# Patient Record
Sex: FEMALE | Race: WHITE | Employment: OTHER | URBAN - METROPOLITAN AREA
[De-identification: names, ages, dates, MRNs, and addresses within clinical notes are randomized per-mention and may not be internally consistent; named-entity substitution may affect disease eponyms.]

---

## 2017-01-04 ENCOUNTER — ANTICOAGULATION THERAPY VISIT (OUTPATIENT)
Dept: FAMILY MEDICINE | Facility: CLINIC | Age: 82
End: 2017-01-04
Payer: COMMERCIAL

## 2017-01-04 ENCOUNTER — TELEPHONE (OUTPATIENT)
Dept: FAMILY MEDICINE | Facility: CLINIC | Age: 82
End: 2017-01-04

## 2017-01-04 DIAGNOSIS — Z79.01 LONG-TERM (CURRENT) USE OF ANTICOAGULANTS: Primary | ICD-10-CM

## 2017-01-04 DIAGNOSIS — I48.91 ATRIAL FIBRILLATION (H): ICD-10-CM

## 2017-01-04 LAB — INR PPP: 2.6

## 2017-01-04 PROCEDURE — 99207 ZZC NO CHARGE NURSE ONLY: CPT | Performed by: INTERNAL MEDICINE

## 2017-01-04 NOTE — PROGRESS NOTES
ANTICOAGULATION FOLLOW-UP CLINIC VISIT    Patient Name:  Sintia Connors  Date:  1/4/2017  Contact Type:  Telephone/ Dose instructions left on [facility] voice mail from home care ([phone])    SUBJECTIVE:     Patient Findings     Positives No Problem Findings           OBJECTIVE    INR   Date Value Ref Range Status   01/04/2017 2.6  Final       ASSESSMENT / PLAN  INR assessment THER    Recheck INR In: 4 WEEKS    INR Location [facility] INR      Anticoagulation Summary as of 1/4/2017     INR goal 2.0-3.0   Selected INR 2.6 (1/4/2017)   Maintenance plan 4.5 mg (3 mg x 1.5) on Mon, Fri; 3 mg (3 mg x 1) all other days   Full instructions 4.5 mg on Mon, Fri; 3 mg all other days   Weekly total 24 mg   No change documented Felisha Cano RN   Plan last modified Amanda Riddle RN (12/5/2016)   Next INR check 2/1/2017   Priority INR   Target end date     Indications   Long-term (current) use of anticoagulants [Z79.01] [Z79.01]  Atrial fibrillation (H) [I48.91]         Anticoagulation Episode Summary     INR check location     Preferred lab     Send INR reminders to [facility]    Comments       Anticoagulation Care Providers     Provider Role Specialty Phone number    Lazaro Gupta MD Responsible Internal Medicine [phone]            See the Encounter Report to view Anticoagulation Flowsheet and Dosing Calendar (Go to Encounters tab in chart review, and find the Anticoagulation Therapy Visit)    Dosage adjustment made based on physician directed care plan.    Felisha Cano RN

## 2017-01-04 NOTE — TELEPHONE ENCOUNTER
Reason for Call:  INR    Who is calling?  Home [facility]: [facility]    Phone number:[phone]    Fax number:    Name of caller: Lindsay    INR Value:  2.6    Are there any other concerns:  No    Can we leave a detailed message on this number? YES    Phone number patient can be reached at: Home number on file [phone] (home)      Call taken on 1/4/2017 at 12:14 PM by Maya Cote

## 2017-01-16 DIAGNOSIS — I48.0 PAROXYSMAL ATRIAL FIBRILLATION (H): Primary | ICD-10-CM

## 2017-01-17 NOTE — TELEPHONE ENCOUNTER
metoprolol (LOPRESSOR) 25 MG tablet      Last Written Prescription Date: 5/11/2016  Last Fill Quantity: 180, # refills: 2    Last Office Visit with [facility], [facility] or [facility] prescribing provider:  9/13/2016   Future Office Visit:        BP Readings from Last 3 Encounters:   09/13/16 136/74   03/15/16 135/82   10/06/15 131/75

## 2017-01-18 RX ORDER — METOPROLOL TARTRATE 25 MG/1
TABLET, FILM COATED ORAL
Qty: 135 TABLET | Refills: 0 | Status: SHIPPED | OUTPATIENT
Start: 2017-01-18 | End: 2017-08-09

## 2017-02-08 ENCOUNTER — ANTICOAGULATION THERAPY VISIT (OUTPATIENT)
Dept: FAMILY MEDICINE | Facility: CLINIC | Age: 82
End: 2017-02-08
Payer: COMMERCIAL

## 2017-02-08 ENCOUNTER — TELEPHONE (OUTPATIENT)
Dept: FAMILY MEDICINE | Facility: CLINIC | Age: 82
End: 2017-02-08

## 2017-02-08 DIAGNOSIS — I48.91 ATRIAL FIBRILLATION (H): ICD-10-CM

## 2017-02-08 DIAGNOSIS — Z79.01 LONG-TERM (CURRENT) USE OF ANTICOAGULANTS: Primary | ICD-10-CM

## 2017-02-08 LAB — INR PPP: 2.8

## 2017-02-08 PROCEDURE — 99207 ZZC NO CHARGE NURSE ONLY: CPT | Performed by: INTERNAL MEDICINE

## 2017-02-08 NOTE — TELEPHONE ENCOUNTER
Reason for Call:  INR    Who is calling?  Home [facility]: [facility]    Phone number:  [phone]    Name of caller: Lindsay     INR Value:  2.8    Are there any other concerns:  No    Can we leave a detailed message on this number? YES    Phone number patient can be reached at: Home number on file [phone] (home)      Call taken on 2/8/2017 at 4:26 PM by Ana Higginbotham

## 2017-02-08 NOTE — PROGRESS NOTES
ANTICOAGULATION FOLLOW-UP CLINIC VISIT    Patient Name:  Sintia Connors  Date:  2/8/2017  Contact Type:  Telephone    SUBJECTIVE:     Patient Findings     Positives Missed doses           OBJECTIVE    INR   Date Value Ref Range Status   02/08/2017 2.8  Final       ASSESSMENT / PLAN  INR assessment THER    Recheck INR In: 4 WEEKS    INR Location [facility] INR      Anticoagulation Summary as of 2/8/2017     INR goal 2.0-3.0   Selected INR 2.8 (2/8/2017)   Maintenance plan 4.5 mg (3 mg x 1.5) on Mon, Fri; 3 mg (3 mg x 1) all other days   Full instructions 4.5 mg on Mon, Fri; 3 mg all other days   Weekly total 24 mg   Plan last modified Amanda Riddle RN (12/5/2016)   Next INR check 3/15/2017   Priority INR   Target end date     Indications   Long-term (current) use of anticoagulants [Z79.01] [Z79.01]  Atrial fibrillation (H) [I48.91]         Anticoagulation Episode Summary     INR check location     Preferred lab     Send INR reminders to [facility]    Comments       Anticoagulation Care Providers     Provider Role Specialty Phone number    Lazaro Gupta MD [facility] [phone]            See the Encounter Report to view Anticoagulation Flowsheet and Dosing Calendar (Go to Encounters tab in chart review, and find the Anticoagulation Therapy Visit)    Dosage adjustment made based on physician directed care plan. [facility] nurse, Lindsay, reports INR 2.8.  Continue same dosing and recheck 4-5 weeks. Lindsay's phone: [phone]    Bertha Tidwell RN

## 2017-02-27 ENCOUNTER — ANTICOAGULATION THERAPY VISIT (OUTPATIENT)
Dept: FAMILY MEDICINE | Facility: CLINIC | Age: 82
End: 2017-02-27
Payer: COMMERCIAL

## 2017-02-27 ENCOUNTER — TELEPHONE (OUTPATIENT)
Dept: FAMILY MEDICINE | Facility: CLINIC | Age: 82
End: 2017-02-27

## 2017-02-27 DIAGNOSIS — I48.0 PAROXYSMAL ATRIAL FIBRILLATION (H): ICD-10-CM

## 2017-02-27 DIAGNOSIS — F41.9 ANXIETY: ICD-10-CM

## 2017-02-27 DIAGNOSIS — Z79.01 LONG-TERM (CURRENT) USE OF ANTICOAGULANTS: ICD-10-CM

## 2017-02-27 LAB — INR PPP: 2.6

## 2017-02-27 PROCEDURE — 99207 ZZC NO CHARGE NURSE ONLY: CPT | Performed by: INTERNAL MEDICINE

## 2017-02-27 NOTE — PROGRESS NOTES
ANTICOAGULATION FOLLOW-UP CLINIC VISIT    Patient Name:  Sintia Connors  Date:  2/27/2017  Contact Type:  Telephone/ [facility]    SUBJECTIVE:     Patient Findings     Positives No Problem Findings           OBJECTIVE    INR   Date Value Ref Range Status   02/27/2017 2.6  Final       ASSESSMENT / PLAN  INR assessment THER    Recheck INR In: 4 WEEKS    INR Location [facility] INR      Anticoagulation Summary as of 2/27/2017     INR goal 2.0-3.0   Today's INR 2.6   Maintenance plan 4.5 mg (3 mg x 1.5) on Mon, Fri; 3 mg (3 mg x 1) all other days   Full instructions 4.5 mg on Mon, Fri; 3 mg all other days   Weekly total 24 mg   No change documented Amanda Riddle RN   Plan last modified Amanda Riddle RN (12/5/2016)   Next INR check 3/27/2017   Priority INR   Target end date     Indications   Long-term (current) use of anticoagulants [Z79.01] [Z79.01]  Atrial fibrillation (H) [I48.91]         Anticoagulation Episode Summary     INR check location     Preferred lab     Send INR reminders to [facility]AG[facility]    Comments       Anticoagulation Care Providers     Provider Role Specialty Phone number    Lazaro Gupta MD Responsible Internal Medicine [phone]            See the Encounter Report to view Anticoagulation Flowsheet and Dosing Calendar (Go to Encounters tab in chart review, and find the Anticoagulation Therapy Visit)  Nurse Lindsay, Confirms and agrees to dosing scheduled as above. Dosing based on [facility] Protocol and Provider directed care plan.       Amanda Riddle RN

## 2017-02-27 NOTE — MR AVS SNAPSHOT
Sintia Connors   2/27/2017   Anticoagulation Therapy Visit    Description:  81 year old female   Provider:  Lazaro Gupta MD   Department:  [facility]           INR as of 2/27/2017     Today's INR 2.6      Anticoagulation Summary as of 2/27/2017     INR goal 2.0-3.0   Today's INR 2.6   Full instructions 4.5 mg on Mon, Fri; 3 mg all other days   Next INR check 3/27/2017    Indications   Long-term (current) use of anticoagulants [Z79.01] [Z79.01]  Atrial fibrillation (H) [I48.91]         Description     INR     Who is calling? Home [facility]     Phone number: [phone]     Name of caller: Lindsay     INR Value: 2.6      February 2017 Details    Sun Mon Tue Wed Thu Fri Sat        1               2               3               4                 5               6               7               8               9               10               11                 12               13               14               15               16               17               18                 19               20               21               22               23               24               25                 26               27      4.5 mg   See details      28      3 mg              Date Details   02/27 This INR check               How to take your warfarin dose     To take:  3 mg Take 1 of the 3 mg tablets.    To take:  4.5 mg Take 1.5 of the 3 mg tablets.           March 2017 Details    Sun Mon Tue Wed Thu Fri Sat        1      3 mg         2      3 mg         3      4.5 mg         4      3 mg           5      3 mg         6      4.5 mg         7      3 mg         8      3 mg         9      3 mg         10      4.5 mg         11      3 mg           12      3 mg         13      4.5 mg         14      3 mg         15      3 mg         16      3 mg         17      4.5 mg         18      3 mg           19      3 mg         20      4.5 mg         21      3 mg         22      3 mg         23      3  mg         24      4.5 mg         25      3 mg           26      3 mg         27            28               29               30               31                 Date Details   No additional details    Date of next INR:  3/27/2017         How to take your warfarin dose     To take:  3 mg Take 1 of the 3 mg tablets.    To take:  4.5 mg Take 1.5 of the 3 mg tablets.

## 2017-02-27 NOTE — TELEPHONE ENCOUNTER
Reason for Call:  INR    Who is calling?  Home [facility]    Phone number:  [phone]    Name of caller: Lindsay    INR Value:  2.6    Are there any other concerns:  No    Can we leave a detailed message on this number? YES    Call taken on 2/27/2017 at 1:07 PM by Singh Cunningham

## 2017-02-28 NOTE — TELEPHONE ENCOUNTER
QUEtiapine (SEROQUEL) 25 MG tablet       Last Written Prescription Date: 5/23/2016  Last Fill Quantity: 180, # refills: 2  Last Office Visit with [facility], [facility] or [facility] prescribing provider: 9/13/2016  Next 5 appointments (look out 90 days)     Mar 07, 2017  3:30 PM CST   Office Visit with [facility] MD   [facility]    [address] [address]   [phone]                   BP Readings from Last 3 Encounters:   09/13/16 136/74   03/15/16 135/82   10/06/15 131/75     Pulse Readings from Last 2 Encounters:   09/13/16 80   03/15/16 97     Lab Results   Component Value Date    GLC 82 03/15/2016     Lab Results   Component Value Date    WBC 8.1 03/15/2016     Lab Results   Component Value Date    RBC 5.11 03/15/2016     Lab Results   Component Value Date    HGB 13.8 03/15/2016     Lab Results   Component Value Date    HCT 43.4 03/15/2016     No components found for: MCT  Lab Results   Component Value Date    MCV 85 03/15/2016     Lab Results   Component Value Date    MCH 27.0 03/15/2016     Lab Results   Component Value Date    MCHC 31.8 03/15/2016     Lab Results   Component Value Date    RDW 16.6 03/15/2016     Lab Results   Component Value Date     03/15/2016     Lab Results   Component Value Date    CHOL 158 12/24/2014     Lab Results   Component Value Date    HDL 56 12/24/2014     Lab Results   Component Value Date    LDL 87 12/24/2014     07/30/2013     Lab Results   Component Value Date    TRIG 77 12/24/2014     Lab Results   Component Value Date    CHOLHDLRATIO 2.8 12/24/2014

## 2017-03-01 RX ORDER — QUETIAPINE FUMARATE 25 MG/1
TABLET, FILM COATED ORAL
Qty: 180 TABLET | Refills: 0 | Status: SHIPPED | OUTPATIENT
Start: 2017-03-01 | End: 2017-12-12

## 2017-03-01 NOTE — TELEPHONE ENCOUNTER
Prescription approved per [facility] Protocol.  Bertha Tidwell RN

## 2017-03-07 ENCOUNTER — OFFICE VISIT (OUTPATIENT)
Dept: FAMILY MEDICINE | Facility: CLINIC | Age: 82
End: 2017-03-07
Payer: COMMERCIAL

## 2017-03-07 VITALS
HEIGHT: 60 IN | SYSTOLIC BLOOD PRESSURE: 126 MMHG | WEIGHT: 171 LBS | DIASTOLIC BLOOD PRESSURE: 74 MMHG | BODY MASS INDEX: 33.57 KG/M2 | HEART RATE: 98 BPM

## 2017-03-07 DIAGNOSIS — R07.9 CHEST PAIN, UNSPECIFIED TYPE: Primary | ICD-10-CM

## 2017-03-07 PROCEDURE — 93000 ELECTROCARDIOGRAM COMPLETE: CPT | Performed by: NURSE PRACTITIONER

## 2017-03-07 PROCEDURE — 99214 OFFICE O/P EST MOD 30 MIN: CPT | Performed by: NURSE PRACTITIONER

## 2017-03-07 NOTE — PROGRESS NOTES
HPI    SUBJECTIVE:                                                    Sintia Connors is a 81 year old female who presents to clinic today for the following health issues:      CHEST PAIN     Onset: 1 week ago    Description:   Location:  Center of chest  Character: sharp  Radiation: none  Duration: 1 minute     Intensity: moderate    Progression of Symptoms:  Improved now    Accompanying Signs & Symptoms:  Shortness of breath: YES- but always has SOB issues  Sweating: no   Nausea/vomiting: no   Lightheadedness: no   Palpitations: no  Fever/Chills: no   Cough: no   Heartburn: YES- hx    History:   Family history of heart disease YES  Tobacco use: no     Precipitating factors:   Worse with exertion: not since  Worse with deep breaths :  no   Related to food: no     Alleviating factors:  Sitting to rest       Therapies Tried and outcome: none      Was on home bike about 4 hours in where she got chest pain right between the breasts 1 week ago. As soon as the chest pain started, she stopped biking and laid down   This was the first episode in 5 months   This only lasted 1 minute and hasn't returned  On coumadin   Has been biking everyday since this episode without symptoms   [place] like her heart was hollow and she felt like it was similar to heartburn that she does get      Past Medical History   Diagnosis Date     Atrial flutter (H) 4/12/15     post op ANW     GERD (gastroesophageal reflux disease)      PVC (premature ventricular contraction)      Rheumatoid arthritis (H)      S/P lumpectomy of breast      benign      Seizures (H)      Senile osteoporosis      Past Surgical History   Procedure Laterality Date     Hernia repair       hiatial     Hernia repair       inguial hernia repair     Hysterectomy total abdominal, bilateral salpingo-oophorectomy, combined       Appendectomy       Athroplasty right knee  4/2015     C total knee arthroplasty Left 09/2015     Social History   Substance Use Topics     Smoking status:  Former Smoker     Quit date: 7/25/1983     Smokeless tobacco: Never Used     Alcohol use No     Current Outpatient Prescriptions   Medication Sig Dispense Refill     QUEtiapine (SEROQUEL) 25 MG tablet TAKE 1 TABLET(25 MG) BY MOUTH TWICE DAILY 180 tablet 0     metoprolol (LOPRESSOR) 25 MG tablet TAKE 1 AND 1/2 TABLETS(37.5 MG) BY MOUTH TWICE DAILY 135 tablet 0     warfarin (COUMADIN) 3 MG tablet TAKE 1 TO 1& 1/2 TABLETS(3 TO 4.5MG) BY MOUTH EVERY DAY AS DIRECTED BY INR CLINIC 110 tablet 1     flunisolide (NASALIDE) 25 MCG/ACT (0.025%) SOLN spray Spray 2 sprays into both nostrils 2 times daily 1 Bottle 3     mirtazapine (REMERON) 15 MG tablet TAKE 1 TABLET BY MOUTH AT BEDTIME 90 tablet 3     diclofenac (VOLTAREN) 1 % GEL APPLY 4 GRAMS TO KNEES OR 2 GRAMS TO HANDS FOUR TIMES DAILY USING ENCLOSED DOSING CARD. 100 g 3     esomeprazole (NEXIUM) 40 MG capsule Take 1 capsule (40 mg) by mouth every morning (before breakfast) Take 30-60 minutes before a eating. 30 capsule 11     calcium carbonate (TUMS) 500 MG chewable tablet Take 1 tablet (500 mg) by mouth every 6 hours as needed for heartburn 90 tablet      glucosamine-chondroitin 500-400 MG CAPS Take 1 capsule by mouth 3 times daily       acetaminophen (TYLENOL) 325 MG tablet Take 1-2 tablets (325-650 mg) by mouth every 6 hours as needed for mild pain Take 2 tabs (1000 mg) as needed three times daily       ORDER FOR DME Equipment being ordered: Shower Chair 1 each 0     ORDER FOR DME Equipment being ordered: Bath transfer bench  Fax to:[phone] 1 Device 0     ORDER FOR DME Equipment being ordered: transfer bath bench 1 Device 0     levETIRAcetam (KEPPRA) 500 MG tablet Take 1 tablet (500 mg) by mouth 2 times daily Verbal order per ; given to pharmacist at this time. (Patient taking differently: Take 750 mg by mouth 2 times daily Verbal order per ; given to pharmacist at this time.) 180 tablet 1     multivitamin, therapeutic with minerals  (MULTI-VITAMIN) TABS Take 1 tablet by mouth daily.       metoprolol (LOPRESSOR) 25 MG tablet Take 1 tablet (25 mg) by mouth 2 times daily 180 tablet 2     Allergies   Allergen Reactions     Lorazepam Visual Disturbance     Severe hallucinations;       Reviewed PMH, med list and allergies.      ROS  Detailed as above       /74 (BP Location: Right arm, Patient Position: Chair, Cuff Size: Adult Large)  Pulse 98  Ht 5' (1.524 m)  Wt 171 lb (77.6 kg)  Breastfeeding? No  BMI 33.4 kg/m2      Physical Exam   Constitutional: She is well-developed, well-nourished, and in no distress.   HENT:   Head: Normocephalic.   Eyes: Conjunctivae are normal.   Neck: Normal range of motion.   Cardiovascular: Normal rate, regular rhythm and normal heart sounds.    No murmur heard.  Pulmonary/Chest: Effort normal and breath sounds normal. No respiratory distress.   Musculoskeletal: Normal range of motion.   Neurological: She is alert.   Skin: Skin is warm and dry.   Psychiatric: Mood and affect normal.   Vitals reviewed.      Assessment and Plan:       ICD-10-CM    1. Chest pain, unspecified type R07.9 EKG 12-lead complete w/read - Clinics       A single episode of 1 min of chest pain 1 week ago while on her bike for about 4 hours.   She has been biking long durations since this episode without recurrence of CP   EKG today is normal and unchanged from previous.   As she has been able to exercise without any CP for the last week, I feel it is safe to watch and wait. If any new symptoms develop, she should call us right away          RONY Whitfield, CNP  [facility]

## 2017-03-07 NOTE — MR AVS SNAPSHOT
After Visit Summary   3/7/2017    Sintia Connors    MRN: [id number]           Patient Information     Date Of Birth          1935        Visit Information        Provider Department      3/7/2017 4:15 PM Tonia Brice APRN CNP [facility]        Today's Diagnoses     Chest pain, unspecified type    -  1       Follow-ups after your visit        Who to contact     If you have questions or need follow up information about today's clinic visit or your schedule please contact [facility] directly at [phone].  Normal or non-critical lab and imaging results will be communicated to you by "[employer]hart, letter or phone within 4 business days after the clinic has received the results. If you do not hear from us within 7 days, please contact the clinic through [employer] or phone. If you have a critical or abnormal lab result, we will notify you by phone as soon as possible.  Submit refill requests through [employer] or call your pharmacy and they will forward the refill request to us. Please allow 3 business days for your refill to be completed.          Additional Information About Your Visit        MyChart Information     [employer] gives you secure access to your electronic health record. If you see a primary care provider, you can also send messages to your care team and make appointments. If you have questions, please call your primary care clinic.  If you do not have a primary care provider, please call [phone] and they will assist you.        Care EveryWhere ID     This is your Care EveryWhere ID. This could be used by other organizations to access your [facility] medical records  [id number]        Your Vitals Were     Pulse Height Breastfeeding? BMI (Body Mass Index)          98 5' (1.524 m) No 33.4 kg/m2         Blood Pressure from Last 3 Encounters:   03/07/17 126/74   09/13/16 136/74   03/15/16 135/82    Weight from Last 3 Encounters:   03/07/17 171 lb (77.6 kg)    09/13/16 171 lb (77.6 kg)   03/15/16 171 lb (77.6 kg)              We Performed the Following     EKG 12-lead complete w/read - Clinics          Today's Medication Changes          These changes are accurate as of: 3/7/17  5:28 PM.  If you have any questions, ask your nurse or doctor.               These medicines have changed or have updated prescriptions.        Dose/Directions    levETIRAcetam 500 MG tablet   Commonly known as:  KEPPRA   This may have changed:    - how much to take  - additional instructions   Used for:  Seizure (H)        Dose:  500 mg   Take 1 tablet (500 mg) by mouth 2 times daily Verbal order per ; given to pharmacist at this time.   Quantity:  180 tablet   Refills:  1                Primary Care Provider Office Phone # Fax #    Lazaro Gupta [phone]       [facility] [address] [facility] [address]  [facility] [address]        Thank you!     Thank you for choosing [facility]  for your care. Our goal is always to provide you with excellent care. Hearing back from our patients is one way we can continue to improve our services. Please take a few minutes to complete the written survey that you may receive in the mail after your visit with us. Thank you!             Your Updated Medication List - Protect others around you: Learn how to safely use, store and throw away your medicines at www.disposemymeds.org.          This list is accurate as of: 3/7/17  5:28 PM.  Always use your most recent med list.                   Brand Name Dispense Instructions for use    acetaminophen 325 MG tablet    TYLENOL     Take 1-2 tablets (325-650 mg) by mouth every 6 hours as needed for mild pain Take 2 tabs (1000 mg) as needed three times daily       calcium carbonate 500 MG chewable tablet    TUMS    90 tablet    Take 1 tablet (500 mg) by mouth every 6 hours as needed for heartburn       diclofenac 1 % Gel topical gel    VOLTAREN    100 g    APPLY 4 GRAMS  TO KNEES OR 2 GRAMS TO HANDS FOUR TIMES DAILY USING ENCLOSED DOSING CARD.       esomeprazole 40 MG CR capsule    nexIUM    30 capsule    Take 1 capsule (40 mg) by mouth every morning (before breakfast) Take 30-60 minutes before a eating.       flunisolide 25 MCG/ACT (0.025%) Soln spray    NASALIDE    1 Bottle    Spray 2 sprays into both nostrils 2 times daily       glucosamine-chondroitin 500-400 MG Caps per capsule      Take 1 capsule by mouth 3 times daily       levETIRAcetam 500 MG tablet    KEPPRA    180 tablet    Take 1 tablet (500 mg) by mouth 2 times daily Verbal order per ; given to pharmacist at this time.       * metoprolol 25 MG tablet    LOPRESSOR    180 tablet    Take 1 tablet (25 mg) by mouth 2 times daily       * metoprolol 25 MG tablet    LOPRESSOR    135 tablet    TAKE 1 AND 1/2 TABLETS(37.5 MG) BY MOUTH TWICE DAILY       mirtazapine 15 MG tablet    REMERON    90 tablet    TAKE 1 TABLET BY MOUTH AT BEDTIME       Multi-vitamin Tabs tablet      Take 1 tablet by mouth daily.       * order for DME     1 Device    Equipment being ordered: transfer bath bench       * order for DME     1 Device    Equipment being ordered: Bath transfer bench Fax to:[phone]       * order for DME     1 each    Equipment being ordered: Shower Chair       QUEtiapine 25 MG tablet    SEROquel    180 tablet    TAKE 1 TABLET(25 MG) BY MOUTH TWICE DAILY       warfarin 3 MG tablet    COUMADIN    110 tablet    TAKE 1 TO 1& 1/2 TABLETS(3 TO 4.5MG) BY MOUTH EVERY DAY AS DIRECTED BY INR CLINIC       * Notice:  This list has 5 medication(s) that are the same as other medications prescribed for you. Read the directions carefully, and ask your doctor or other care provider to review them with you.

## 2017-03-20 ENCOUNTER — ANTICOAGULATION THERAPY VISIT (OUTPATIENT)
Dept: FAMILY MEDICINE | Facility: CLINIC | Age: 82
End: 2017-03-20
Payer: COMMERCIAL

## 2017-03-20 ENCOUNTER — TELEPHONE (OUTPATIENT)
Dept: FAMILY MEDICINE | Facility: CLINIC | Age: 82
End: 2017-03-20

## 2017-03-20 DIAGNOSIS — I48.0 PAROXYSMAL ATRIAL FIBRILLATION (H): ICD-10-CM

## 2017-03-20 DIAGNOSIS — Z79.01 LONG-TERM (CURRENT) USE OF ANTICOAGULANTS: ICD-10-CM

## 2017-03-20 LAB — INR PPP: 4

## 2017-03-20 PROCEDURE — 99207 ZZC NO CHARGE NURSE ONLY: CPT | Performed by: INTERNAL MEDICINE

## 2017-03-20 NOTE — TELEPHONE ENCOUNTER
ACC Enct Started but need to discuss with nurse  Left message for Lindsay russo to call back to clinic    Need to know any changes, any extra doses, signs infection, and schedule next check in 1 week  Poornima Sebastian RN

## 2017-03-20 NOTE — MR AVS SNAPSHOT
Sintia Connors   3/20/2017   Anticoagulation Therapy Visit    Description:  81 year old female   Provider:  Lazaro Gupta MD   Department:  [facility]           INR as of 3/20/2017     Today's INR 4.0!      Anticoagulation Summary as of 3/20/2017     INR goal 2.0-3.0   Today's INR 4.0!   Full instructions 3/20: Hold; Otherwise 4.5 mg on Mon, Fri; 3 mg all other days   Next INR check 3/27/2017    Indications   Long-term (current) use of anticoagulants [Z79.01] [Z79.01]  Atrial fibrillation (H) [I48.91]         March 2017 Details    Sun Mon Tue Wed Thu Fri Sat        1               2               3               4                 5               6               7               8               9               10               11                 12               13               14               15               16               17               18                 19               20      Hold   See details      21      3 mg         22      3 mg         23      3 mg         24      4.5 mg         25      3 mg           26      3 mg         27            28               29               30               31                 Date Details   03/20 This INR check       Date of next INR:  3/27/2017         How to take your warfarin dose     To take:  3 mg Take 1 of the 3 mg tablets.    To take:  4.5 mg Take 1.5 of the 3 mg tablets.    Hold Do not take your warfarin dose. See the Details table to the right for additional instructions.

## 2017-03-20 NOTE — PROGRESS NOTES
ANTICOAGULATION FOLLOW-UP CLINIC VISIT    Patient Name:  Sintia Connors  Date:  3/20/2017  Contact Type:  Telephone/ Spoke with Lindsay (Home Care RN). Provided dosing instruction and will recheck in 1 week.    SUBJECTIVE:     Patient Findings     Positives Unexplained INR or factor level change    Comments I spoke with Home Care RN--No change diet, no new medications, no recent illness. Patient reports that she is feeling well.            OBJECTIVE    INR   Date Value Ref Range Status   03/20/2017 4.0  Final       ASSESSMENT / PLAN  INR assessment SUPRA    Recheck INR In: 1 WEEK    INR Location [facility] INR      Anticoagulation Summary as of 3/20/2017     INR goal 2.0-3.0   Today's INR 4.0!   Maintenance plan 4.5 mg (3 mg x 1.5) on Mon, Fri; 3 mg (3 mg x 1) all other days   Full instructions 3/20: Hold; Otherwise 4.5 mg on Mon, Fri; 3 mg all other days   Weekly total 24 mg   Plan last modified Amanda Riddle RN (12/5/2016)   Next INR check 3/27/2017   Priority INR   Target end date     Indications   Long-term (current) use of anticoagulants [Z79.01] [Z79.01]  Atrial fibrillation (H) [I48.91]         Anticoagulation Episode Summary     INR check location     Preferred lab     Send INR reminders to CS ANTICOAGULATION    Comments       Anticoagulation Care Providers     Provider Role Specialty Phone number    IrisramezLazaro MD [facility] Internal Medicine [phone]            See the Encounter Report to view Anticoagulation Flowsheet and Dosing Calendar (Go to Encounters tab in chart review, and find the Anticoagulation Therapy Visit)    Dosage adjustment made based on physician directed care plan.  Will have patient hold dose for tonight due to high INR (4.0). Resume normal dosing after that.   Recheck in 1 week.     Lindsay Ellis RN

## 2017-03-20 NOTE — TELEPHONE ENCOUNTER
Spoke with home care RN and provided dosing instructions based on INR results for patient.  See Anticoagulation Encounter from 3/20/17 for more details.    Lindsay Ellis RN

## 2017-03-20 NOTE — TELEPHONE ENCOUNTER
Reason for Call:  INR    Who is calling?  Home Care: [facility]     Phone number:  [phone]    Fax number:  NA     Name of caller: Lindsay     INR Value:  4.0    Are there any other concerns: NO     Can we leave a detailed message on this number? YES    Phone number patient can be reached at: Other phone number:  NA       Call taken on 3/20/2017 at 11:47 AM by Gaviota Tijerina

## 2017-03-27 ENCOUNTER — ANTICOAGULATION THERAPY VISIT (OUTPATIENT)
Dept: FAMILY MEDICINE | Facility: CLINIC | Age: 82
End: 2017-03-27
Payer: COMMERCIAL

## 2017-03-27 ENCOUNTER — TELEPHONE (OUTPATIENT)
Dept: FAMILY MEDICINE | Facility: CLINIC | Age: 82
End: 2017-03-27

## 2017-03-27 DIAGNOSIS — I48.0 PAROXYSMAL ATRIAL FIBRILLATION (H): ICD-10-CM

## 2017-03-27 DIAGNOSIS — Z79.01 LONG-TERM (CURRENT) USE OF ANTICOAGULANTS: ICD-10-CM

## 2017-03-27 LAB — INR PPP: 2.5

## 2017-03-27 PROCEDURE — 99207 ZZC NO CHARGE NURSE ONLY: CPT | Performed by: INTERNAL MEDICINE

## 2017-03-27 NOTE — MR AVS SNAPSHOT
Sintia Connors   3/27/2017   Anticoagulation Therapy Visit    Description:  81 year old female   Provider:  Lazaro Gupta MD   Department:  [facility]           INR as of 3/27/2017     Today's INR 2.5      Anticoagulation Summary as of 3/27/2017     INR goal 2.0-3.0   Today's INR 2.5   Full instructions 4.5 mg on Mon, Fri; 3 mg all other days   Next INR check 4/10/2017    Indications   Long-term (current) use of anticoagulants [Z79.01] [Z79.01]  Atrial fibrillation (H) [I48.91]         Description     INR     Who is calling? Home Care:      Phone number: [phone]     Fax number:         Name of caller: Lindsay     INR Value: 2.5      March 2017 Details    Sun Mon Tue Wed Thu Fri Sat        1               2               3               4                 5               6               7               8               9               10               11                 12               13               14               15               16               17               18                 19               20               21               22               23               24               25                 26               27      4.5 mg   See details      28      3 mg         29      3 mg         30      3 mg         31      4.5 mg           Date Details   03/27 This INR check               How to take your warfarin dose     To take:  3 mg Take 1 of the 3 mg tablets.    To take:  4.5 mg Take 1.5 of the 3 mg tablets.           April 2017 Details    Sun Mon Tue Wed Thu Fri Sat           1      3 mg           2      3 mg         3      4.5 mg         4      3 mg         5      3 mg         6      3 mg         7      4.5 mg         8      3 mg           9      3 mg         10            11               12               13               14               15                 16               17               18               19               20               21               22                 23                24               25               26               27               28               29                 30                      Date Details   No additional details    Date of next INR:  4/10/2017         How to take your warfarin dose     To take:  3 mg Take 1 of the 3 mg tablets.    To take:  4.5 mg Take 1.5 of the 3 mg tablets.

## 2017-03-27 NOTE — PROGRESS NOTES
ANTICOAGULATION FOLLOW-UP CLINIC VISIT    Patient Name:  Sintia Connors  Date:  3/27/2017  Contact Type:  Telephone/ [facility]    SUBJECTIVE:        OBJECTIVE    INR   Date Value Ref Range Status   03/27/2017 2.5  Final       ASSESSMENT / PLAN  INR assessment THER    Recheck INR In: 2 WEEKS    INR Location [facility] INR      Anticoagulation Summary as of 3/27/2017     INR goal 2.0-3.0   Today's INR 2.5   Maintenance plan 4.5 mg (3 mg x 1.5) on Mon, Fri; 3 mg (3 mg x 1) all other days   Full instructions 4.5 mg on Mon, Fri; 3 mg all other days   Weekly total 24 mg   No change documented Amanda Riddle RN   Plan last modified Amanda Riddle RN (12/5/2016)   Next INR check 4/10/2017   Priority INR   Target end date     Indications   Long-term (current) use of anticoagulants [Z79.01] [Z79.01]  Atrial fibrillation (H) [I48.91]         Anticoagulation Episode Summary     INR check location     Preferred lab     Send INR reminders to [facility] ANTICOAGULATION    Comments       Anticoagulation Care Providers     Provider Role Specialty Phone number    Lazaro Gupta MD [facility] Internal Medicine [phone]            See the Encounter Report to view Anticoagulation Flowsheet and Dosing Calendar (Go to Encounters tab in chart review, and find the Anticoagulation Therapy Visit)    Lindsay confirms and agrees to dosing scheduled as above.  Dosing based on [facility] Protocol and Provider directed care plan.      Amanda Riddle RN

## 2017-03-27 NOTE — TELEPHONE ENCOUNTER
Reason for Call:  INR    Who is calling?  Home Care: [facility]    Phone number:  [phone]    Fax number:        Name of caller: Lindsay    INR Value:  2.5    Are there any other concerns:  No    Can we leave a detailed message on this number? YES          Call taken on 3/27/2017 at 11:55 AM by Bonnie Joy

## 2017-04-10 ENCOUNTER — ANTICOAGULATION THERAPY VISIT (OUTPATIENT)
Dept: FAMILY MEDICINE | Facility: CLINIC | Age: 82
End: 2017-04-10
Payer: COMMERCIAL

## 2017-04-10 ENCOUNTER — TELEPHONE (OUTPATIENT)
Dept: FAMILY MEDICINE | Facility: CLINIC | Age: 82
End: 2017-04-10

## 2017-04-10 DIAGNOSIS — I48.0 PAROXYSMAL ATRIAL FIBRILLATION (H): ICD-10-CM

## 2017-04-10 DIAGNOSIS — Z79.01 LONG-TERM (CURRENT) USE OF ANTICOAGULANTS: ICD-10-CM

## 2017-04-10 LAB — INR PPP: 2.5

## 2017-04-10 PROCEDURE — 99207 ZZC NO CHARGE NURSE ONLY: CPT | Performed by: INTERNAL MEDICINE

## 2017-04-10 NOTE — PROGRESS NOTES
ANTICOAGULATION FOLLOW-UP CLINIC VISIT    Patient Name:  Sintia Connors  Date:  4/10/2017  Contact Type:  Telephone/ [facility]    SUBJECTIVE:     Patient Findings     Positives No Problem Findings           OBJECTIVE    INR   Date Value Ref Range Status   04/10/2017 2.5  Final       ASSESSMENT / PLAN  INR assessment THER    Recheck INR In: 4 WEEKS    INR Location [facility] INR      Anticoagulation Summary as of 4/10/2017     INR goal 2.0-3.0   Today's INR 2.5   Maintenance plan 4.5 mg (3 mg x 1.5) on Mon, Fri; 3 mg (3 mg x 1) all other days   Full instructions 4.5 mg on Mon, Fri; 3 mg all other days   Weekly total 24 mg   No change documented Amanda Riddle RN   Plan last modified Amanda Riddle RN (12/5/2016)   Next INR check 5/8/2017   Priority INR   Target end date     Indications   Long-term (current) use of anticoagulants [Z79.01] [Z79.01]  Atrial fibrillation (H) [I48.91]         Anticoagulation Episode Summary     INR check location     Preferred lab     Send INR reminders to [facility] ANTICOAGULATION    Comments       Anticoagulation Care Providers     Provider Role Specialty Phone number    Lazaro Gupta MD Responsible Internal Medicine [phone]            See the Encounter Report to view Anticoagulation Flowsheet and Dosing Calendar (Go to Encounters tab in chart review, and find the Anticoagulation Therapy Visit)    Detailed message left for Lindsay with dosing instructions. Dosing based on [facility] Protocol and Provider directed care plan.      Amanda Riddle RN

## 2017-04-10 NOTE — MR AVS SNAPSHOT
Sintia Shepherdroberth   4/10/2017   Anticoagulation Therapy Visit    Description:  81 year old female   Provider:  Lazaro Gupta MD   Department:  [facility]           INR as of 4/10/2017     Today's INR 2.5      Anticoagulation Summary as of 4/10/2017     INR goal 2.0-3.0   Today's INR 2.5   Full instructions 4.5 mg on Mon, Fri; 3 mg all other days   Next INR check 5/8/2017    Indications   Long-term (current) use of anticoagulants [Z79.01] [Z79.01]  Atrial fibrillation (H) [I48.91]         Description     INR     Who is calling? [facility]     Phone number: [phone]     Name of caller: Lindsay     INR Value: 2.5      April 2017 Details    Sun Mon Tue Wed Thu Fri Sat           1                 2               3               4               5               6               7               8                 9               10      4.5 mg   See details      11      3 mg         12      3 mg         13      3 mg         14      4.5 mg         15      3 mg           16      3 mg         17      4.5 mg         18      3 mg         19      3 mg         20      3 mg         21      4.5 mg         22      3 mg           23      3 mg         24      4.5 mg         25      3 mg         26      3 mg         27      3 mg         28      4.5 mg         29      3 mg           30      3 mg                Date Details   04/10 This INR check               How to take your warfarin dose     To take:  3 mg Take 1 of the 3 mg tablets.    To take:  4.5 mg Take 1.5 of the 3 mg tablets.           May 2017 Details    Sun Mon Tue Wed Thu Fri Sat      1      4.5 mg         2      3 mg         3      3 mg         4      3 mg         5      4.5 mg         6      3 mg           7      3 mg         8            9               10               11               12               13                 14               15               16               17               18               19               20                  21               22               23               24               25               26               27                 28               29               30               31                   Date Details   No additional details    Date of next INR:  5/8/2017         How to take your warfarin dose     To take:  3 mg Take 1 of the 3 mg tablets.    To take:  4.5 mg Take 1.5 of the 3 mg tablets.

## 2017-04-10 NOTE — TELEPHONE ENCOUNTER
Reason for Call:  INR    Who is calling?  [facility]    Phone number:  [phone]    Name of caller: Lindsay    INR Value:  2.5    Are there any other concerns:  No    Can we leave a detailed message on this number? YES          Call taken on 4/10/2017 at 1:05 PM by Singh Cunningham

## 2017-04-17 DIAGNOSIS — I48.91 ATRIAL FIBRILLATION (H): ICD-10-CM

## 2017-04-18 RX ORDER — WARFARIN SODIUM 3 MG/1
TABLET ORAL
Qty: 110 TABLET | Refills: 0 | Status: SHIPPED | OUTPATIENT
Start: 2017-04-18 | End: 2017-11-10

## 2017-04-18 NOTE — TELEPHONE ENCOUNTER
Anticoagulation Monitoring Instructions   Latest Ref Rng & Units    4/10/2017 4.5 mg on Mon, Fri; 3 mg all other days   Prescription approved per [facility] Refill Protocol.  Amanda Riddle RN

## 2017-04-27 ENCOUNTER — TELEPHONE (OUTPATIENT)
Dept: FAMILY MEDICINE | Facility: CLINIC | Age: 82
End: 2017-04-27

## 2017-04-27 DIAGNOSIS — R56.9 CONVULSIONS, UNSPECIFIED CONVULSION TYPE (H): Primary | ICD-10-CM

## 2017-04-27 DIAGNOSIS — R56.9 SEIZURE (H): ICD-10-CM

## 2017-04-27 NOTE — TELEPHONE ENCOUNTER
Patient's son  States she has a week left of this  Patient's son  Pérez wants to speak to Dr.Idelkope Ortez can be reached  At # [phone]    Pérez is concerned that this RX has not been filled yet, I did let him know that it can  Take up to 72 hours for refill requests and that  is out of the Office today  But that her refill request is being addressed    Thank you

## 2017-04-27 NOTE — TELEPHONE ENCOUNTER
Reason for Call:  Medication or medication refill:    Do you use a [facility]?  Name of the pharmacy and phone number for the current request:  [email or web address] [address] [place], [place] - [address]      Name of the medication requested: levETIRAcetam (KEPPRA) 500 MG tablet    Other request:     Can we leave a detailed message on this number? YES    Phone number patient can be reached at: Home number on file [phone] (home)    Best Time:     Call taken on 4/27/2017 at 4:30 PM by Leticia Choi

## 2017-04-27 NOTE — TELEPHONE ENCOUNTER
I confirmed with pharmacist that she is now taking generic Keppra 750 mg BID, this was more recently being prescribed by a different physician but now needs to be prescribed again by Dr Gupta    levETIRAcetam (KEPPRA)  Last Written Prescription Date:   Last Fill Quantity: 180,  # refills: 1  Last Office Visit with [facility], P or [facility] prescribing provider: 9/13/16    Giovanni Peres CMA

## 2017-04-28 RX ORDER — LEVETIRACETAM 750 MG/1
750 TABLET ORAL 2 TIMES DAILY
Qty: 180 TABLET | Refills: 3 | Status: CANCELLED | OUTPATIENT
Start: 2017-04-28

## 2017-04-28 NOTE — TELEPHONE ENCOUNTER
This prescription for Keppra was discussed with my father Myron Gupta MD - [facility] of neurology and I believe that this prescription should be sent there.    Lazaro Gupta MD

## 2017-04-28 NOTE — TELEPHONE ENCOUNTER
TEAM [employer]INATORS: PLEASE CALL PATIENT AND SCHEDULE FOLLOW UP WITH DR. VAZQUEZ  MEDICATION REQUIRES 6 MONTH FOLLOW UP AND LAST OFFICE VISIT WAS September 2016  ROUTE BACK TO RX REFILLS ONCE SCHEDULED.    levETIRAcetam (KEPPRA) 500 MG tablet      Last Written Prescription Date: 1/21/14  Last Fill Quantity: 180,  # refills: 1   Last Office Visit with [facility], P or [facility] prescribing provider: 09/13/2016                  Lindsay Ellis RN

## 2017-04-28 NOTE — TELEPHONE ENCOUNTER
I called to below:  [facility]  [place] Office [phone]    Patient was last seen there February 2014- Dr. Myron Gupta  The last time levETIRAcetam (KEPPRA) was prescribed was back in 2014 and it was for 750 mg BID    PCP: please advise.     Lindsay Ellis RN

## 2017-04-28 NOTE — TELEPHONE ENCOUNTER
Its confusing because I have not prescribed this medication since January of 2014.   Can she confirm from who she has been getting the prescription for the past three years?    Lazaro Gupta MD

## 2017-04-28 NOTE — TELEPHONE ENCOUNTER
PCP: Can you clarify which dose the patient should be taking? 500 mg BID or 750 mg BID    levETIRAcetam (KEPPRA) 500 MG tablet 180 tablet 1 1/21/2014  --      Sig: Take 1 tablet (500 mg) by mouth 2 times daily Verbal order per ; given to pharmacist at this time.     Patient taking differently: Take 750 mg by mouth 2 times daily Verbal order per ; given to pharmacist at this time.          Class: No Print Out     Lindsay Ellis RN

## 2017-05-01 ENCOUNTER — TELEPHONE (OUTPATIENT)
Dept: FAMILY MEDICINE | Facility: CLINIC | Age: 82
End: 2017-05-01

## 2017-05-01 RX ORDER — LEVETIRACETAM 750 MG/1
TABLET ORAL
Qty: 60 TABLET | Refills: 0 | OUTPATIENT
Start: 2017-05-01

## 2017-05-01 NOTE — TELEPHONE ENCOUNTER
Dr. Gupta, I sent the message to Dr. Sanches and no one has gotten back to the patient's son.  The patient is almost out of the medication.  Please send an emergency supply if appropriate.     Nanda Briggs MA

## 2017-05-01 NOTE — TELEPHONE ENCOUNTER
Called and spoke to patient's son and advised he call neurology.  He will do so.  Patient has appointment tomorrow w/ Dr. Osuna.   Nanda Briggs MA

## 2017-05-01 NOTE — TELEPHONE ENCOUNTER
PCP:    Pt called back. Per Pt she has been filling her prescription at [facility] in [facility]. Pt states script was given to her from Dr. Myron Gupta.   I called [facility], Script was written by Dr. Myron Gupta in Feb 2016 for a one year supply. Last script was picked up in Feb 2017 for 60 day supply.   Nothing has been picked up since then.     Pt has appointment with Dr. Osuna tomorrow.     Please advise.

## 2017-05-01 NOTE — TELEPHONE ENCOUNTER
Denial sent to pharmacy. Advised they send script to Dr. Myron Gupta at [facility].     Velma Robert RN

## 2017-05-01 NOTE — TELEPHONE ENCOUNTER
Clinic Care Coordination Contact  Care Team Conversations    Patient has not been seen at [facility] for 3 years, this is why it was denied. She was supposed to f/u in Sept of 2014 but never did.   Patient was made aware by neurology. If patient schedules a f/u appt with Neurology, then neurology is willing to fill until the appt.   Jessika Junior RN  Clinic Care Coordinator  [facility]  [phone]

## 2017-05-01 NOTE — TELEPHONE ENCOUNTER
The Son called and he is very up set at the moment because he has not heard back about the refill  And his Mother is almost out   He just wants one of her Doc's to fill her medication without a lot af run around  No one has given them a reason for not wanting to refill the medication and he is upset that her doctor will not call him and talk with him about this.  Pérez wants a call back today from Dr. Gupta at [phone]

## 2017-05-01 NOTE — TELEPHONE ENCOUNTER
I sent this to Dr. Myron Gupta last week.  Could someone please call the patient's son back?    Nanda Briggs MA

## 2017-05-02 DIAGNOSIS — R56.9 SEIZURE (H): ICD-10-CM

## 2017-05-02 NOTE — TELEPHONE ENCOUNTER
Pending Prescriptions:                       Disp   Refills    levETIRAcetam (KEPPRA) 500 MG tablet      180 ta*1            Sig: Take 1 tablet (500 mg) by mouth 2 times daily           Verbal order per ; given to pharmacist           at this time.          Last Written Prescription Date: 01/21/14  Last Fill Quantity: 180,  # refills: 1   Last Office Visit with [facility], [facility] or [facility] prescribing provider: 03/07/17

## 2017-05-03 RX ORDER — LEVETIRACETAM 500 MG/1
500 TABLET ORAL 2 TIMES DAILY
Qty: 180 TABLET | Refills: 1 | OUTPATIENT
Start: 2017-05-03

## 2017-05-03 NOTE — TELEPHONE ENCOUNTER
Routing refill request to provider for review/approval because:  A break in medication; last Rx sent 2014  Per pharmacy, last Rx from Myron DIALLO RN

## 2017-05-08 ENCOUNTER — TELEPHONE (OUTPATIENT)
Dept: FAMILY MEDICINE | Facility: CLINIC | Age: 82
End: 2017-05-08

## 2017-05-08 ENCOUNTER — ANTICOAGULATION THERAPY VISIT (OUTPATIENT)
Dept: FAMILY MEDICINE | Facility: CLINIC | Age: 82
End: 2017-05-08
Payer: COMMERCIAL

## 2017-05-08 DIAGNOSIS — Z79.01 LONG-TERM (CURRENT) USE OF ANTICOAGULANTS: ICD-10-CM

## 2017-05-08 DIAGNOSIS — I48.0 PAROXYSMAL ATRIAL FIBRILLATION (H): ICD-10-CM

## 2017-05-08 LAB — INR PPP: 3

## 2017-05-08 PROCEDURE — 99207 ZZC NO CHARGE NURSE ONLY: CPT | Performed by: INTERNAL MEDICINE

## 2017-05-08 NOTE — MR AVS SNAPSHOT
Sintia Shepherdroberth   5/8/2017   Anticoagulation Therapy Visit    Description:  81 year old female   Provider:  Lazaro Gupta MD   Department:  [facility]           INR as of 5/8/2017     Today's INR 3.0      Anticoagulation Summary as of 5/8/2017     INR goal 2.0-3.0   Today's INR 3.0   Full instructions 4.5 mg on Mon, Fri; 3 mg all other days   Next INR check 6/5/2017    Indications   Long-term (current) use of anticoagulants [Z79.01] [Z79.01]  Atrial fibrillation (H) [I48.91]         Description     INR     Who is calling? Home Care: [facility]     Phone number: [phone]     Fax number:      Name of caller: Lindsay     INR Value: 3.0         May 2017 Details    Sun Mon Tue Wed Thu Fri Sat      1               2               3               4               5               6                 7               8      4.5 mg   See details      9      3 mg         10      3 mg         11      3 mg         12      4.5 mg         13      3 mg           14      3 mg         15      4.5 mg         16      3 mg         17      3 mg         18      3 mg         19      4.5 mg         20      3 mg           21      3 mg         22      4.5 mg         23      3 mg         24      3 mg         25      3 mg         26      4.5 mg         27      3 mg           28      3 mg         29      4.5 mg         30      3 mg         31      3 mg             Date Details   05/08 This INR check               How to take your warfarin dose     To take:  3 mg Take 1 of the 3 mg tablets.    To take:  4.5 mg Take 1.5 of the 3 mg tablets.           June 2017 Details    Sun Mon Tue Wed Thu Fri Sat         1      3 mg         2      4.5 mg         3      3 mg           4      3 mg         5            6               7               8               9               10                 11               12               13               14               15               16               17                 18               19                20               21               22               23               24                 25               26               27               28               29               30                 Date Details   No additional details    Date of next INR:  6/5/2017         How to take your warfarin dose     To take:  3 mg Take 1 of the 3 mg tablets.    To take:  4.5 mg Take 1.5 of the 3 mg tablets.

## 2017-05-08 NOTE — TELEPHONE ENCOUNTER
Reason for Call:  INR    Who is calling?  Home Care[facility]    Phone number:  [phone]    Fax number:      Name of caller: Lindsay    INR Value:  3.0    Are there any other concerns:  No    Can we leave a detailed message on this number? YES        Call taken on 5/8/2017 at 11:41 AM by Bonnie Joy

## 2017-05-08 NOTE — PROGRESS NOTES
ANTICOAGULATION FOLLOW-UP CLINIC VISIT    Patient Name:  Sintia Connors  Date:  5/8/2017  Contact Type:  Telephone/ [facility]    SUBJECTIVE:        OBJECTIVE    INR   Date Value Ref Range Status   05/08/2017 3.0  Final       ASSESSMENT / PLAN  INR assessment THER    Recheck INR In: 4 WEEKS    INR Location [facility] INR      Anticoagulation Summary as of 5/8/2017     INR goal 2.0-3.0   Today's INR 3.0   Maintenance plan 4.5 mg (3 mg x 1.5) on Mon, Fri; 3 mg (3 mg x 1) all other days   Full instructions 4.5 mg on Mon, Fri; 3 mg all other days   Weekly total 24 mg   No change documented Amanda Riddle RN   Plan last modified Amanda Riddle RN (12/5/2016)   Next INR check 6/5/2017   Priority INR   Target end date     Indications   Long-term (current) use of anticoagulants [Z79.01] [Z79.01]  Atrial fibrillation (H) [I48.91]         Anticoagulation Episode Summary     INR check location     Preferred lab     Send INR reminders to CS ANTICOAGULATION    Comments       Anticoagulation Care Providers     Provider Role Specialty Phone number    Lazaro Gupta MD [facility] Internal Medicine [phone]            See the Encounter Report to view Anticoagulation Flowsheet and Dosing Calendar (Go to Encounters tab in chart review, and find the Anticoagulation Therapy Visit)    Lindsay confirms and agrees to dosing schedule as above.  Dosing based on [facility] Protocol and Provider directed care plan.      Amanda Riddle RN

## 2017-06-07 ENCOUNTER — TELEPHONE (OUTPATIENT)
Dept: FAMILY MEDICINE | Facility: CLINIC | Age: 82
End: 2017-06-07

## 2017-06-07 NOTE — TELEPHONE ENCOUNTER
Returned call to María Elena CAMPOS with [facility]. Gave verbal OK for requested orders for PT, social work, OT and speech therapy evaluation.  Orders will be faxed to PCP for review and signature.    Madison Og RN

## 2017-06-07 NOTE — TELEPHONE ENCOUNTER
Called to provide VO for below, no Nurses were available at the time.   Will need to recall later today.     Orders are for PT, SW, OT and ST evaluation. Will need to give these orders to ANDRES Ring.     Velma Robert RN

## 2017-06-07 NOTE — TELEPHONE ENCOUNTER
Reason for Call: Request for an order or referral:    Order or referral being requested: PT skilled nursing and OT and     Date needed: as soon as possible    Has the patient been seen by the PCP for this problem?     Additional comments: faxed orders on 6/7 am    Phone number Patient can be reached at:  Kemi [phone] [facility]    Best Time:  any    Can we leave a detailed message on this number?  YES    Call taken on 6/7/2017 at 11:40 AM by Chinyere Santoro

## 2017-06-12 ENCOUNTER — TELEPHONE (OUTPATIENT)
Dept: FAMILY MEDICINE | Facility: CLINIC | Age: 82
End: 2017-06-12

## 2017-06-12 NOTE — TELEPHONE ENCOUNTER
Returned call. OK'd requested orders.  Orders will be faxed to PCP for review and signature.   Claudia Mehta RN

## 2017-06-12 NOTE — TELEPHONE ENCOUNTER
Reason for Call: Request for an order or referral:    Order or referral being requested: verbal homecare- PT strength and balance 2 days a week for 3 weeks    Date needed: as soon as possible    Has the patient been seen by the PCP for this problem? YES    Additional comments:     Phone number Patient can be reached at:  Other phone number:  [phone]    Best Time:  anyitme    Can we leave a detailed message on this number?  YES    Call taken on 6/12/2017 at 5:06 PM by Kelly Sheikh

## 2017-06-13 ENCOUNTER — TELEPHONE (OUTPATIENT)
Dept: FAMILY MEDICINE | Facility: CLINIC | Age: 82
End: 2017-06-13

## 2017-06-13 NOTE — TELEPHONE ENCOUNTER
Verbal approval given per request below. Homecare/Hospice agency to fax orders for provider signature.  Poornima Sebastian RN

## 2017-06-13 NOTE — TELEPHONE ENCOUNTER
Reason for Call:  Home Health Care    Mignon with [facility]  Homecare called regarding (reason for call):     Orders are needed for this patient.     PT:     OT: 2/3w and 1/1w    Skilled Nursing:     Pt Provider: Dr. Gupta    Phone Number Homecare Nurse can be reached at: [phone]    Can we leave a detailed message on this number? YES    Phone number patient can be reached at: Home number on file [phone] (home)    Best Time:     Call taken on 6/13/2017 at 11:26 AM by Cristina Bonds

## 2017-06-20 ENCOUNTER — OFFICE VISIT (OUTPATIENT)
Dept: FAMILY MEDICINE | Facility: CLINIC | Age: 82
End: 2017-06-20
Payer: COMMERCIAL

## 2017-06-20 ENCOUNTER — TELEPHONE (OUTPATIENT)
Dept: FAMILY MEDICINE | Facility: CLINIC | Age: 82
End: 2017-06-20

## 2017-06-20 ENCOUNTER — MYC MEDICAL ADVICE (OUTPATIENT)
Dept: FAMILY MEDICINE | Facility: CLINIC | Age: 82
End: 2017-06-20

## 2017-06-20 VITALS
OXYGEN SATURATION: 96 % | WEIGHT: 164 LBS | DIASTOLIC BLOOD PRESSURE: 72 MMHG | SYSTOLIC BLOOD PRESSURE: 136 MMHG | TEMPERATURE: 98.2 F | HEIGHT: 60 IN | BODY MASS INDEX: 32.2 KG/M2 | HEART RATE: 83 BPM

## 2017-06-20 DIAGNOSIS — E53.8 VITAMIN B12 DEFICIENCY WITHOUT ANEMIA: ICD-10-CM

## 2017-06-20 DIAGNOSIS — I63.9 CEREBROVASCULAR ACCIDENT (CVA), UNSPECIFIED MECHANISM (H): ICD-10-CM

## 2017-06-20 DIAGNOSIS — D50.9 IRON DEFICIENCY ANEMIA, UNSPECIFIED IRON DEFICIENCY ANEMIA TYPE: ICD-10-CM

## 2017-06-20 DIAGNOSIS — F32.A DEPRESSION, UNSPECIFIED DEPRESSION TYPE: ICD-10-CM

## 2017-06-20 DIAGNOSIS — I63.40 CEREBRAL INFARCTION DUE TO EMBOLISM OF CEREBRAL ARTERY (H): ICD-10-CM

## 2017-06-20 DIAGNOSIS — I49.3 PVC (PREMATURE VENTRICULAR CONTRACTION): Primary | ICD-10-CM

## 2017-06-20 DIAGNOSIS — R56.9 CONVULSIONS, UNSPECIFIED CONVULSION TYPE (H): ICD-10-CM

## 2017-06-20 DIAGNOSIS — K21.9 GASTROESOPHAGEAL REFLUX DISEASE WITHOUT ESOPHAGITIS: ICD-10-CM

## 2017-06-20 DIAGNOSIS — I48.0 PAROXYSMAL ATRIAL FIBRILLATION (H): ICD-10-CM

## 2017-06-20 DIAGNOSIS — M15.0 PRIMARY OSTEOARTHRITIS INVOLVING MULTIPLE JOINTS: Primary | ICD-10-CM

## 2017-06-20 DIAGNOSIS — R41.3 MEMORY LOSS: ICD-10-CM

## 2017-06-20 DIAGNOSIS — M05.79 RHEUMATOID ARTHRITIS INVOLVING MULTIPLE SITES WITH POSITIVE RHEUMATOID FACTOR (H): ICD-10-CM

## 2017-06-20 LAB
ERYTHROCYTE [DISTWIDTH] IN BLOOD BY AUTOMATED COUNT: 14.8 % (ref 10–15)
HCT VFR BLD AUTO: 45.2 % (ref 35–47)
HGB BLD-MCNC: 14.5 G/DL (ref 11.7–15.7)
MCH RBC QN AUTO: 28.4 PG (ref 26.5–33)
MCHC RBC AUTO-ENTMCNC: 32.1 G/DL (ref 31.5–36.5)
MCV RBC AUTO: 89 FL (ref 78–100)
PLATELET # BLD AUTO: 233 10E9/L (ref 150–450)
RBC # BLD AUTO: 5.11 10E12/L (ref 3.8–5.2)
WBC # BLD AUTO: 8.4 10E9/L (ref 4–11)

## 2017-06-20 PROCEDURE — 99214 OFFICE O/P EST MOD 30 MIN: CPT | Performed by: INTERNAL MEDICINE

## 2017-06-20 PROCEDURE — 85027 COMPLETE CBC AUTOMATED: CPT | Performed by: INTERNAL MEDICINE

## 2017-06-20 PROCEDURE — 36415 COLL VENOUS BLD VENIPUNCTURE: CPT | Performed by: INTERNAL MEDICINE

## 2017-06-20 PROCEDURE — 80048 BASIC METABOLIC PNL TOTAL CA: CPT | Performed by: INTERNAL MEDICINE

## 2017-06-20 RX ORDER — CHOLECALCIFEROL (VITAMIN D3) 25 MCG
CAPSULE ORAL
COMMUNITY
End: 2017-08-16

## 2017-06-20 NOTE — NURSING NOTE
Chief Complaint   Patient presents with     Referrals/continued Care/healthcare     discuss referrals to specialists per request of Son Pérez who has POA. Neurology and cardiology referral. See phone note from today.       Initial /72 (BP Location: Right arm, Cuff Size: Adult Large)  Pulse 83  Temp 98.2  F (36.8  C) (Tympanic)  Ht 5' (1.524 m)  Wt 164 lb (74.4 kg)  SpO2 96%  Breastfeeding? No  BMI 32.03 kg/m2 Estimated body mass index is 32.03 kg/(m^2) as calculated from the following:    Height as of this encounter: 5' (1.524 m).    Weight as of this encounter: 164 lb (74.4 kg).  Medication Reconciliation: complete   Herminia Kaur MA

## 2017-06-20 NOTE — TELEPHONE ENCOUNTER
Reason for Call:  Other     Detailed comments: The patient is having an MRI and they want her Creatine done   Can she get that drawn today so she does not have to come back    Follow Up, son called and would like referrals for cardiology (Dr. Messina) and Neurologist (Eliza Fitch with [facility]).  Question does Kidney function test need to be done?  This is on the appt desk as well    Phone Number Patient can be reached at: [phone]    Best Time: anytime    Can we leave a detailed message on this number? YES    Call taken on 6/20/2017 at 2:56 PM by Ana Higginbotham

## 2017-06-20 NOTE — MR AVS SNAPSHOT
After Visit Summary   6/20/2017    Sintia Connors    MRN: [id number]           Patient Information     Date Of Birth          1935        Visit Information        Provider Department      6/20/2017 3:30 PM Jaxson Osuna MD [facility]        Today's Diagnoses     Primary osteoarthritis involving multiple joints    -  1    Paroxysmal atrial fibrillation (H)        Rheumatoid arthritis involving multiple sites with positive rheumatoid factor (H)        Memory loss        Convulsions, unspecified convulsion type (H)        Gastroesophageal reflux disease without esophagitis        Depression, unspecified depression type        Vitamin B12 deficiency without anemia        Iron deficiency anemia, unspecified iron deficiency anemia type        Cerebrovascular accident (CVA), unspecified mechanism (H)           Follow-ups after your visit        Your next 10 appointments already scheduled     Jun 27, 2017  3:40 PM CDT   [facility] Ramone Cabrera DPM   [facility]    [address] [id number] [phone]            Jul 25, 2017  2:00 PM CDT   New Visit with Shawn lPascencia MD   [facility] [place] ([facility])    [address] [id number] [phone]              Who to contact     If you have questions or need follow up information about today's clinic visit or your schedule please contact [facility] directly at [phone].  Normal or non-critical lab and imaging results will be communicated to you by MyChart, letter or phone within 4 business days after the clinic has received the results. If you do not hear from us within 7 days, please contact the clinic through MyChart or phone. If you have a critical or abnormal lab result, we will notify you by phone as soon as possible.  Submit refill requests through [employer] or call your  pharmacy and they will forward the refill request to us. Please allow 3 business days for your refill to be completed.          Additional Information About Your Visit        eYekahart Information     [employer] gives you secure access to your electronic health record. If you see a primary care provider, you can also send messages to your care team and make appointments. If you have questions, please call your primary care clinic.  If you do not have a primary care provider, please call [phone] and they will assist you.        Care EveryWhere ID     This is your Care EveryWhere ID. This could be used by other organizations to access your [facility] medical records  [id number]        Your Vitals Were     Pulse Temperature Height Pulse Oximetry Breastfeeding? BMI (Body Mass Index)    83 98.2  F (36.8  C) (Tympanic) 5' (1.524 m) 96% No 32.03 kg/m2       Blood Pressure from Last 3 Encounters:   06/20/17 136/72   03/07/17 126/74   09/13/16 136/74    Weight from Last 3 Encounters:   06/20/17 164 lb (74.4 kg)   03/07/17 171 lb (77.6 kg)   09/13/16 171 lb (77.6 kg)              We Performed the Following     Basic metabolic panel  (Ca, Cl, CO2, Creat, Gluc, K, Na, BUN)     CBC with platelets          Today's Medication Changes          These changes are accurate as of: 6/20/17 11:59 PM.  If you have any questions, ask your nurse or doctor.               These medicines have changed or have updated prescriptions.        Dose/Directions    levETIRAcetam 500 MG tablet   Commonly known as:  KEPPRA   This may have changed:    - how much to take  - additional instructions   Used for:  Seizure (H)        Dose:  500 mg   Take 1 tablet (500 mg) by mouth 2 times daily Verbal order per ; given to pharmacist at this time.   Quantity:  180 tablet   Refills:  1                Primary Care Provider Office Phone # Fax #    Lazaro Gupta [phone]       [facility] [address]  [facility] [address]        Equal Access to Services     [facility] Center[facility] : Hadii felipe maharaj lenora Sonils, waaxda luqadaha, qaybta kaalmajeferson coon, morgan castrejon. So [facility] [phone].    ATENCIÓN: Si habla español, tiene a agarwal disposición servicios gratuitos de asistencia lingüística. Medardoame al [phone].    We comply with applicable federal civil rights laws and [place] laws. We do not discriminate on the basis of race, color, national origin, age, disability sex, sexual orientation or gender identity.            Thank you!     Thank you for choosing [facility]  for your care. Our goal is always to provide you with excellent care. Hearing back from our patients is one way we can continue to improve our services. Please take a few minutes to complete the written survey that you may receive in the mail after your visit with us. Thank you!             Your Updated Medication List - Protect others around you: Learn how to safely use, store and throw away your medicines at www.disposemymeds.org.          This list is accurate as of: 6/20/17 11:59 PM.  Always use your most recent med list.                   Brand Name Dispense Instructions for use Diagnosis    acetaminophen 325 MG tablet    TYLENOL     Take 1-2 tablets (325-650 mg) by mouth every 6 hours as needed for mild pain Take 2 tabs (1000 mg) as needed three times daily    Primary osteoarthritis of right knee       calcium carbonate 500 MG chewable tablet    TUMS    90 tablet    Take 1 tablet (500 mg) by mouth every 6 hours as needed for heartburn        diclofenac 1 % Gel topical gel    VOLTAREN    100 g    APPLY 4 GRAMS TO KNEES OR 2 GRAMS TO HANDS FOUR TIMES DAILY USING ENCLOSED DOSING CARD.    Other secondary osteoarthritis of knee       esomeprazole 40 MG CR capsule    nexIUM    30 capsule    Take 1 capsule (40 mg) by mouth every morning (before breakfast) Take 30-60 minutes before a eating.    Gastroesophageal reflux  disease with esophagitis, Abdominal pain, epigastric       flunisolide 25 MCG/ACT (0.025%) Soln spray    NASALIDE    1 Bottle    Spray 2 sprays into both nostrils 2 times daily    Nasal congestion       glucosamine-chondroitin 500-400 MG Caps per capsule      Take 1 capsule by mouth 3 times daily        levETIRAcetam 500 MG tablet    KEPPRA    180 tablet    Take 1 tablet (500 mg) by mouth 2 times daily Verbal order per ; given to pharmacist at this time.    Seizure (H)       * metoprolol 25 MG tablet    LOPRESSOR    180 tablet    Take 1 tablet (25 mg) by mouth 2 times daily    Paroxysmal atrial fibrillation (H)       * metoprolol 25 MG tablet    LOPRESSOR    135 tablet    TAKE 1 AND 1/2 TABLETS(37.5 MG) BY MOUTH TWICE DAILY    Paroxysmal atrial fibrillation (H)       Multi-vitamin Tabs tablet      Take 1 tablet by mouth daily.        * order for DME     1 Device    Equipment being ordered: transfer bath bench    Osteoarthritis       * order for DME     1 Device    Equipment being ordered: Bath transfer bench Fax to:[phone]    Osteoarthritis       * order for DME     1 each    Equipment being ordered: Shower Chair    Osteoarthritis of knee       QUEtiapine 25 MG tablet    SEROquel    180 tablet    TAKE 1 TABLET(25 MG) BY MOUTH TWICE DAILY    Anxiety       Vitamin D-3 1000 UNITS Caps           warfarin 3 MG tablet    COUMADIN    110 tablet    TAKE 1 TO 1& 1/2 TABLETS(3 TO 4.5MG) BY MOUTH EVERY DAY AS DIRECTED BY INR CLINIC    Atrial fibrillation (H)       * Notice:  This list has 5 medication(s) that are the same as other medications prescribed for you. Read the directions carefully, and ask your doctor or other care provider to review them with you.

## 2017-06-20 NOTE — PROGRESS NOTES
SUBJECTIVE:                                                    Sintia Connors is a 81 year old female who presents to clinic today for the following health issues:    Mrs. Connors has dementia and bipolar disorder. The patient also has atrial fibrillation and reports she had a stroke 5 weeks ago when she woke before [facility] with dysphasia. She claims she went to the hospital and then rehab but cannot recall what hospital. She reports she has been regaining her speech and is close to back to baseline. Patient repeatedly asks why she has a stroke and does not understand way she should cooperate with a physical exam. Denies depression and hyperactivity. Denies syncope.    Patient reports she has not other mental health or behavioral health disorders other than seizures which has resolved with Keppra.    Problem list and histories reviewed & adjusted, as indicated.  Additional history: as documented    Current Outpatient Prescriptions   Medication Sig Dispense Refill     Cholecalciferol (VITAMIN D-3) 1000 UNITS CAPS        warfarin (COUMADIN) 3 MG tablet TAKE 1 TO 1& 1/2 TABLETS(3 TO 4.5MG) BY MOUTH EVERY DAY AS DIRECTED BY INR CLINIC 110 tablet 0     QUEtiapine (SEROQUEL) 25 MG tablet TAKE 1 TABLET(25 MG) BY MOUTH TWICE DAILY 180 tablet 0     metoprolol (LOPRESSOR) 25 MG tablet TAKE 1 AND 1/2 TABLETS(37.5 MG) BY MOUTH TWICE DAILY 135 tablet 0     flunisolide (NASALIDE) 25 MCG/ACT (0.025%) SOLN spray Spray 2 sprays into both nostrils 2 times daily 1 Bottle 3     diclofenac (VOLTAREN) 1 % GEL APPLY 4 GRAMS TO KNEES OR 2 GRAMS TO HANDS FOUR TIMES DAILY USING ENCLOSED DOSING CARD. 100 g 3     esomeprazole (NEXIUM) 40 MG capsule Take 1 capsule (40 mg) by mouth every morning (before breakfast) Take 30-60 minutes before a eating. 30 capsule 11     metoprolol (LOPRESSOR) 25 MG tablet Take 1 tablet (25 mg) by mouth 2 times daily 180 tablet 2     calcium carbonate (TUMS) 500 MG chewable tablet Take 1 tablet (500 mg) by mouth  every 6 hours as needed for heartburn 90 tablet      glucosamine-chondroitin 500-400 MG CAPS Take 1 capsule by mouth 3 times daily       acetaminophen (TYLENOL) 325 MG tablet Take 1-2 tablets (325-650 mg) by mouth every 6 hours as needed for mild pain Take 2 tabs (1000 mg) as needed three times daily       ORDER FOR DME Equipment being ordered: Shower Chair 1 each 0     ORDER FOR DME Equipment being ordered: Bath transfer bench  Fax to:[phone] 1 Device 0     ORDER FOR DME Equipment being ordered: transfer bath bench 1 Device 0     levETIRAcetam (KEPPRA) 500 MG tablet Take 1 tablet (500 mg) by mouth 2 times daily Verbal order per ; given to pharmacist at this time. (Patient taking differently: Take 750 mg by mouth 2 times daily Verbal order per ; given to pharmacist at this time.) 180 tablet 1     multivitamin, therapeutic with minerals (MULTI-VITAMIN) TABS Take 1 tablet by mouth daily.       Allergies   Allergen Reactions     Lorazepam Visual Disturbance     Severe hallucinations;       Reviewed and updated as needed this visit by clinical staff       Reviewed and updated as needed this visit by Provider         ROS:  Constitutional, HEENT, cardiovascular, pulmonary, gi and gu systems are negative, except as otherwise noted.    This document serves as a record of the services and decisions personally performed and made by Jaxson Osuna MD. It was created on his/her behalf by Aileen Weston, a trained medical scribe. The creation of this document is based the provider's statements to the medical scribe.  Hilaryibgibson Weston 4:24 PM, June 20, 2017     OBJECTIVE:                                                    /72 (BP Location: Right arm, Cuff Size: Adult Large)  Pulse 83  Temp 98.2  F (36.8  C) (Tympanic)  Ht 1.524 m (5')  Wt 74.4 kg (164 lb)  SpO2 96%  Breastfeeding? No  BMI 32.03 kg/m2  Body mass index is 32.03 kg/(m^2).   Easily agitated   Neck was supple without  adenopathy or thyromegaly her carotids were normal without bruits  Chest clear to auscultation and percussion  Cardiovascular S1 and S2 are physiologic without murmurs or gallops.   Abdomen bowel sounds were normal.  There is no palpable mass or organomegaly. obese  Extremities nontender with trace-1+ pretibial edema, bilaterally  Pulses pedal pulses are as described otherwise his pulses are bilaterally symmetrical throughout without bruits  Skin without significant abnormality   Motor and sensory intact. Romberg: rightsided drift with supination of her hand     ASSESSMENT/PLAN:                                                    1. Primary osteoarthritis involving multiple joints    2. Paroxysmal atrial fibrillation (H)  Symptoms not present at this visit    3. Rheumatoid arthritis involving multiple sites with positive rheumatoid factor (H)    4. Memory loss  Patient is easily agitated    5. Convulsions, unspecified convulsion type (H)    6. Gastroesophageal reflux disease without esophagitis    7. Depression, unspecified depression type    8. Vitamin B12 deficiency without anemia    9. Iron deficiency anemia, unspecified iron deficiency anemia type    10. Cerebrovascular accident (CVA), unspecified mechanism (H)  Further information on treatment and post hospital therapy is needed.     Contact power of , her son, for further information as patient is poor historian.     Jaxson Osuna MD  [facility]    The information in this document, created by the medical scribe for me, accurately reflects the services I personally performed and the decisions made by me. I have reviewed and approved this document for accuracy prior to leaving the patient care area.  Jaxson Osuna MD  4:20 PM, 06/20/17

## 2017-06-20 NOTE — TELEPHONE ENCOUNTER
To Dr. Osuna:    I don't see that Coumadin was discontinued.  You have an appt with Patient today at 3:30.  Please advise.  Thank you.  Amanda Riddle RN

## 2017-06-20 NOTE — TELEPHONE ENCOUNTER
Reason for Call:  Other Medication Question    Detailed comments: The patient was taken off Coumadin after her Stroke and her Son wants to know if this is forever    Phone Number Patient can be reached at: Cell number on file:  [phone]         Best Time: anytime    Can we leave a detailed message on this number? YES    Call taken on 6/20/2017 at 2:45 PM by Ana Higginbotham

## 2017-06-20 NOTE — TELEPHONE ENCOUNTER
Please review message below and place appropriate orders. Pended referrals. Please review and advise. Thanks  Peggy Patterson CMA

## 2017-06-21 LAB
ANION GAP SERPL CALCULATED.3IONS-SCNC: 10 MMOL/L (ref 3–14)
BUN SERPL-MCNC: 19 MG/DL (ref 7–30)
CALCIUM SERPL-MCNC: 10 MG/DL (ref 8.5–10.1)
CHLORIDE SERPL-SCNC: 110 MMOL/L (ref 94–109)
CO2 SERPL-SCNC: 22 MMOL/L (ref 20–32)
CREAT SERPL-MCNC: 0.68 MG/DL (ref 0.52–1.04)
GFR SERPL CREATININE-BSD FRML MDRD: 83 ML/MIN/1.7M2
GLUCOSE SERPL-MCNC: 91 MG/DL (ref 70–99)
POTASSIUM SERPL-SCNC: 4.3 MMOL/L (ref 3.4–5.3)
SODIUM SERPL-SCNC: 142 MMOL/L (ref 133–144)

## 2017-06-21 NOTE — TELEPHONE ENCOUNTER
Patient's son, Pérez, returned call from Dr. Osuna.    He would love to speak with .  He will be available all afternoon and evening today.    [phone]

## 2017-06-26 ENCOUNTER — MEDICAL CORRESPONDENCE (OUTPATIENT)
Dept: HEALTH INFORMATION MANAGEMENT | Facility: CLINIC | Age: 82
End: 2017-06-26

## 2017-06-26 DIAGNOSIS — Z53.9 DIAGNOSIS NOT YET DEFINED: Primary | ICD-10-CM

## 2017-06-26 PROCEDURE — G0180 MD CERTIFICATION HHA PATIENT: HCPCS | Performed by: INTERNAL MEDICINE

## 2017-07-06 ENCOUNTER — TELEPHONE (OUTPATIENT)
Dept: FAMILY MEDICINE | Facility: CLINIC | Age: 82
End: 2017-07-06

## 2017-07-06 NOTE — TELEPHONE ENCOUNTER
Reason for Call:  Medication     Do you use a [facility]?  Name of the pharmacy and phone number for the current request:    [facility] [address] [place], [place] - [address]      Name of the medication requested: Patients son calling to see if she can get   medication for a toenail fungus without being seen     Other request: Please call sonPérez either way    Can we leave a detailed message on this number? YES    Phone number patient can be reached at:  [phone]         Best Time: anytime    Call taken on 7/6/2017 at 2:31 PM by Karen Woodall

## 2017-07-07 ENCOUNTER — PRE VISIT (OUTPATIENT)
Dept: CARDIOLOGY | Facility: CLINIC | Age: 82
End: 2017-07-07

## 2017-07-07 NOTE — TELEPHONE ENCOUNTER
Called and pt's son said it was a long time issue and would prefer to have  Her see the podiatrist, scheduled with podiatry 8/1/2017 per sons request

## 2017-07-13 ENCOUNTER — TELEPHONE (OUTPATIENT)
Dept: FAMILY MEDICINE | Facility: CLINIC | Age: 82
End: 2017-07-13

## 2017-07-13 NOTE — TELEPHONE ENCOUNTER
Reason for Call:  Home Health Care    Narcisa (Nurse)  with [facility] called regarding (reason for call): Orders     Orders are needed for this patient. Skilled Nursing orders     PT: NA     OT: NA     Skilled Nursing: Once a week for 9 weeks     Pt Provider: Dr. Osuna    Phone Number Homecare Nurse can be reached at: [phone]    Can we leave a detailed message on this number? YES    Phone number patient can be reached at: Other phone number:  NA     Best Time: ASAP     Call taken on 7/13/2017 at 9:15 AM by Gaviota Tijerina

## 2017-07-13 NOTE — TELEPHONE ENCOUNTER
Verbal orders given to the below orders.  She will fax orders for PCP signature.  Amanda Riddle RN

## 2017-07-13 NOTE — TELEPHONE ENCOUNTER
To PCP:  Per Home Care Nurse;  Patient states she is too sedated with both Mirtazapine (at night) and Seroquel (BID).  She wants to eliminate or reduce them.  Please advise.  Thank you.  Amanda Riddle, RN

## 2017-07-13 NOTE — TELEPHONE ENCOUNTER
Reason for Call:  Discharged/ Readmittance - Alternative Care waiver [place].     Detailed comments: Narcisa ( [facility]) is calling to let Dr. Osuna know that Sintia Connors is being discharged from home care due to being on Medicare. Narcisa ([facility]) is going to readmit Sintia Connors into Home Care for non-medical services. This includes medication management via alternative health care waiver. Narcisa ([facility]) wants Dr. Osuna to know that Sintia Connors is having a hard time getting out of bed because she is oversedated on her QUEtiapine (SEROQUEL) 25 MG tablet and esomeprazole (NEXIUM) 40 MG capsule (her bipolar medication).     Phone Number Patient can be reached at: Other phone number:  [phone]    Best Time: ASAP     Can we leave a detailed message on this number? YES    Call taken on 7/13/2017 at 9:11 AM by Gaviota Tijerina

## 2017-07-20 ENCOUNTER — TELEPHONE (OUTPATIENT)
Dept: FAMILY MEDICINE | Facility: CLINIC | Age: 82
End: 2017-07-20

## 2017-07-20 DIAGNOSIS — R56.9 SEIZURE (H): ICD-10-CM

## 2017-07-20 RX ORDER — LEVETIRACETAM 750 MG/1
750 TABLET ORAL 2 TIMES DAILY
Qty: 180 TABLET | Refills: 3 | Status: SHIPPED | OUTPATIENT
Start: 2017-07-20 | End: 2019-01-01

## 2017-07-20 RX ORDER — LEVETIRACETAM 750 MG/1
750 TABLET ORAL 2 TIMES DAILY
Qty: 60 TABLET | Refills: 11 | Status: SHIPPED | OUTPATIENT
Start: 2017-07-20 | End: 2017-07-20

## 2017-07-20 NOTE — TELEPHONE ENCOUNTER
Reason for Call:  Patient Visit Notes    Detailed comments: Elisa called from [facility] asking for the Visit  Notes from 6-20 for Sintia Ricci #[phone]  Fax # [phone]    Thank you    Call taken on 7/20/2017 at 1:01 PM by Singh Cunningham

## 2017-07-20 NOTE — TELEPHONE ENCOUNTER
Reason for Call:  Medication or medication refill:    Do you use a [facility]?  Name of the pharmacy and phone number for the current request:    [facility] [address] [place], [place] - [address]      Name of the medication requested: Kepra    Other request: pt gets the med through Neurology and is now refusing  To see them anymore. The current med runs until Oct. The son Pérez is  Calling about it. Pt was seeing [facility] and he was not able fill it either he said.    Can we leave a detailed message on this number? Yes    Phone number patient can be reached at: [phone] Noa mims    Best Time: any    Call taken on 7/20/2017 at 2:32 PM by Bonnie Joy

## 2017-07-20 NOTE — TELEPHONE ENCOUNTER
Pending Prescriptions:                       Disp   Refills    levETIRAcetam (KEPPRA) 500 MG tablet      180 ta*1            Sig: Take 1 tablet (500 mg) by mouth 2 times daily           Verbal order per ; given to pharmacist           at this time.      PLEASE SEE MESSAGE BELOW AND REVIEW AND ADVISE PATIENTS REQUEST - Thanks        Last Written Prescription Date:  01/21/2014  Last Fill Quantity: 180,   # refills: 1  Last Office Visit with [facility] or  [facility] prescribing provider: 06/20/2017  Future Office visit:       Routing refill request to provider for review/approval because:  Drug not on the [facility], [facility] or [facility] refill protocol or controlled substance  Managed by neurologist

## 2017-07-24 PROCEDURE — 99207 C MD CERTIFICATION HHA PATIENT: CPT | Performed by: INTERNAL MEDICINE

## 2017-07-25 ENCOUNTER — TELEPHONE (OUTPATIENT)
Dept: FAMILY MEDICINE | Facility: CLINIC | Age: 82
End: 2017-07-25

## 2017-07-25 NOTE — TELEPHONE ENCOUNTER
Reason for Call: Request for an order or referral:    Order or referral being requested: Discontinuation of speech therapy that requires physician to sign off on     Date needed: as soon as possible    Has the patient been seen by the PCP for this problem? YES    Additional comments: Pt's home care nurse called this afternoon and needs Dr. Osuna to sign off on the discontinuation of speech therapy through [facility]. Please give Bob (Pt's home care nurse) a call back if there are any questions in regards to this.    Phone number Patient can be reached at:  Other phone number:  Bob: [phone]    Best Time:      Can we leave a detailed message on this number?  YES    Call taken on 7/25/2017 at 1:43 PM by Bre Larsen

## 2017-07-26 NOTE — TELEPHONE ENCOUNTER
Call to Bob @ [phone], verbal order to DC speech therapy given to on call nurse Milton and he will present to  in the morning.  Lelia Carrera RN

## 2017-08-01 ENCOUNTER — OFFICE VISIT (OUTPATIENT)
Dept: PODIATRY | Facility: CLINIC | Age: 82
End: 2017-08-01
Payer: COMMERCIAL

## 2017-08-01 VITALS
HEART RATE: 106 BPM | SYSTOLIC BLOOD PRESSURE: 146 MMHG | WEIGHT: 164 LBS | DIASTOLIC BLOOD PRESSURE: 77 MMHG | BODY MASS INDEX: 32.2 KG/M2 | HEIGHT: 60 IN

## 2017-08-01 DIAGNOSIS — B35.1 ONYCHOMYCOSIS: Primary | ICD-10-CM

## 2017-08-01 PROCEDURE — 99213 OFFICE O/P EST LOW 20 MIN: CPT | Performed by: PODIATRIST

## 2017-08-01 NOTE — LETTER
8/1/2017         RE: Sintia Connors  [address]  [facility] [address]        Dear Colleague,    Thank you for referring your patient, Sintia Connors, to the [facility]. Please see a copy of my visit note below.    Weight management plan: Patient was referred to their PCP to discuss a diet and exercise plan.     ALEX Dawn MA August 1, 2017 3:25 PM      PATIENT HISTORY:  Sintia Connors is a 82 year old female who presents to clinic for thick toenails b/l.  Present for years.  Denies pain.  Asking about clipping of the nails today.  No treatments reported.      Review of Systems:  Patient denies fever, chills, rash, wound, stiffness, limping, numbness, weakness, heart burn, blood in stool, chest pain with activity, calf pain when walking, shortness of breath with activity, chronic cough, easy bleeding/bruising, swelling of ankles, excessive thirst, fatigue, depression, anxiety.      PAST MEDICAL HISTORY:   Past Medical History:   Diagnosis Date     Atrial flutter (H) 4/12/15    post op ANW     GERD (gastroesophageal reflux disease)      PVC (premature ventricular contraction)      Rheumatoid arthritis (H)      S/P lumpectomy of breast     benign      Seizures (H)      Senile osteoporosis    Hx of CVA, memory loss     PAST SURGICAL HISTORY:   Past Surgical History:   Procedure Laterality Date     APPENDECTOMY       Athroplasty right knee  4/2015     C TOTAL KNEE ARTHROPLASTY Left 09/2015     HERNIA REPAIR      hiatial     HERNIA REPAIR      inguial hernia repair     HYSTERECTOMY TOTAL ABDOMINAL, BILATERAL SALPINGO-OOPHORECTOMY, COMBINED          MEDICATIONS:   Current Outpatient Prescriptions:      levETIRAcetam (KEPPRA) 750 MG tablet, Take 1 tablet (750 mg) by mouth 2 times daily, Disp: 180 tablet, Rfl: 3     Cholecalciferol (VITAMIN D-3) 1000 UNITS CAPS, , Disp: , Rfl:      warfarin (COUMADIN) 3 MG tablet, TAKE 1 TO 1& 1/2 TABLETS(3 TO 4.5MG) BY MOUTH EVERY DAY AS DIRECTED BY INR CLINIC,  Disp: 110 tablet, Rfl: 0     QUEtiapine (SEROQUEL) 25 MG tablet, TAKE 1 TABLET(25 MG) BY MOUTH TWICE DAILY, Disp: 180 tablet, Rfl: 0     metoprolol (LOPRESSOR) 25 MG tablet, TAKE 1 AND 1/2 TABLETS(37.5 MG) BY MOUTH TWICE DAILY, Disp: 135 tablet, Rfl: 0     flunisolide (NASALIDE) 25 MCG/ACT (0.025%) SOLN spray, Spray 2 sprays into both nostrils 2 times daily, Disp: 1 Bottle, Rfl: 3     diclofenac (VOLTAREN) 1 % GEL, APPLY 4 GRAMS TO KNEES OR 2 GRAMS TO HANDS FOUR TIMES DAILY USING ENCLOSED DOSING CARD., Disp: 100 g, Rfl: 3     esomeprazole (NEXIUM) 40 MG capsule, Take 1 capsule (40 mg) by mouth every morning (before breakfast) Take 30-60 minutes before a eating., Disp: 30 capsule, Rfl: 11     metoprolol (LOPRESSOR) 25 MG tablet, Take 1 tablet (25 mg) by mouth 2 times daily, Disp: 180 tablet, Rfl: 2     calcium carbonate (TUMS) 500 MG chewable tablet, Take 1 tablet (500 mg) by mouth every 6 hours as needed for heartburn, Disp: 90 tablet, Rfl:      glucosamine-chondroitin 500-400 MG CAPS, Take 1 capsule by mouth 3 times daily, Disp: , Rfl:      acetaminophen (TYLENOL) 325 MG tablet, Take 1-2 tablets (325-650 mg) by mouth every 6 hours as needed for mild pain Take 2 tabs (1000 mg) as needed three times daily, Disp: , Rfl:      ORDER FOR DME, Equipment being ordered: Shower Chair, Disp: 1 each, Rfl: 0     ORDER FOR DME, Equipment being ordered: Bath transfer bench Fax to:[phone], Disp: 1 Device, Rfl: 0     ORDER FOR DME, Equipment being ordered: transfer bath bench, Disp: 1 Device, Rfl: 0     multivitamin, therapeutic with minerals (MULTI-VITAMIN) TABS, Take 1 tablet by mouth daily., Disp: , Rfl:      ALLERGIES:    Allergies   Allergen Reactions     Lorazepam Visual Disturbance     Severe hallucinations;        SOCIAL HISTORY:   Social History     Social History     Marital status: Single     Spouse name: N/A     Number of children: N/A     Years of education: N/A     Occupational History     Not on file.     Social  History Main Topics     Smoking status: Former Smoker     Quit date: 7/25/1983     Smokeless tobacco: Never Used     Alcohol use No     Drug use: No     Sexual activity: No     Other Topics Concern     Caffeine Concern No     coffee: 1/2 cup in the morning     Sleep Concern No     Stress Concern No     Weight Concern No     Special Diet No     Exercise Yes     stationary bike daily, walking around condo     Seat Belt Yes     Social History Narrative    No longer  - Living in a condo - looking to move to a more senior living    3 children - Chica living in [facility], Basil lives in [facility], P[place]rez in the [place]        FAMILY HISTORY:   Family History   Problem Relation Age of Onset     HEART DISEASE No family hx of      Arrhythmia No family hx of         EXAM:Vitals: /77 (BP Location: Right arm, Patient Position: Chair, Cuff Size: Adult Large)  Pulse 106  Ht 5' (1.524 m)  Wt 164 lb (74.4 kg)  BMI 32.03 kg/m2  BMI= Body mass index is 32.03 kg/(m^2).    General appearance: Patient is alert and fully cooperative with history & exam.  No sign of distress is noted during the visit.     Psychiatric: Affect is pleasant & appropriate.  Patient appears motivated to improve health.  Seems a bit forgetful during encounter.     Respiratory: Breathing is regular & unlabored while sitting.     HEENT: Hearing is intact to spoken word.  Speech is clear.  No gross evidence of visual impairment that would impact ambulation.     Dermatologic:  B/l toenails thickened, dystrophic, discolored, mildly elongated. Skin is intact to both feet without significant lesions, rash or abrasion.  No paronychia or evidence of soft tissue infection is noted.  Atrophic skin to feet b/l.     Vascular: DP & PT pulses are intact & regular bilaterally.  CFT and skin temperature are normal to both lower extremities.     Neurologic: Lower extremity sensation is intact to light touch.  No evidence of weakness or contracture in the lower  extremities.  No evidence of neuropathy.     Musculoskeletal: Lesser hammertoes b/l.  Patient is ambulatory with a walker.  No gross ankle deformity noted.  No foot or ankle joint effusion is noted.     ASSESSMENT: b/l onychomycosis     PLAN:  Reviewed patient's chart in epic.  Discussed condition and treatment options including pros and cons.    Discussed causes of nail fungus.  Discussed treatment options with patient and explained that there isn't one treatment that is 100% effective.  Debridement is an option.  Discussed oral lamisil which is the most effective at about 70% but can have liver effects.  Discussed topicals, which are less effective.  Discussed over the counter antifungal creams.  Discussed Vicks once/day.   Also talked about prescription topicals, which have similar efficacy.  Discussed that if it becomes painful, we can remove the nail in clinic.      I offered nail debridement today.  I explained it may not be covered under insurance.  ABN was signed.  I explained that we normally don't do routine foot care/nail trimming.  Offered alternative nail care services as a less costly option.  Pt declined nail debridement and elected to proceed with a nail care service.  List given.  Handout given on nail fungus.    Advised wider deeper shoes to avoid pressure on nails/toes for comfort.    F/u prn.    Ramone Cabrera DPM, FACFAS      Again, thank you for allowing me to participate in the care of your patient.        Sincerely,        Ramone Cabrera DPM

## 2017-08-01 NOTE — PROGRESS NOTES
PATIENT HISTORY:  Sintia Connors is a 82 year old female who presents to clinic for thick toenails b/l.  Present for years.  Denies pain.  Asking about clipping of the nails today.  No treatments reported.      Review of Systems:  Patient denies fever, chills, rash, wound, stiffness, limping, numbness, weakness, heart burn, blood in stool, chest pain with activity, calf pain when walking, shortness of breath with activity, chronic cough, easy bleeding/bruising, swelling of ankles, excessive thirst, fatigue, depression, anxiety.      PAST MEDICAL HISTORY:   Past Medical History:   Diagnosis Date     Atrial flutter (H) 4/12/15    post op ANW     GERD (gastroesophageal reflux disease)      PVC (premature ventricular contraction)      Rheumatoid arthritis (H)      S/P lumpectomy of breast     benign      Seizures (H)      Senile osteoporosis    Hx of CVA, memory loss     PAST SURGICAL HISTORY:   Past Surgical History:   Procedure Laterality Date     APPENDECTOMY       Athroplasty right knee  4/2015     C TOTAL KNEE ARTHROPLASTY Left 09/2015     HERNIA REPAIR      hiatial     HERNIA REPAIR      inguial hernia repair     HYSTERECTOMY TOTAL ABDOMINAL, BILATERAL SALPINGO-OOPHORECTOMY, COMBINED          MEDICATIONS:   Current Outpatient Prescriptions:      levETIRAcetam (KEPPRA) 750 MG tablet, Take 1 tablet (750 mg) by mouth 2 times daily, Disp: 180 tablet, Rfl: 3     Cholecalciferol (VITAMIN D-3) 1000 UNITS CAPS, , Disp: , Rfl:      warfarin (COUMADIN) 3 MG tablet, TAKE 1 TO 1& 1/2 TABLETS(3 TO 4.5MG) BY MOUTH EVERY DAY AS DIRECTED BY INR CLINIC, Disp: 110 tablet, Rfl: 0     QUEtiapine (SEROQUEL) 25 MG tablet, TAKE 1 TABLET(25 MG) BY MOUTH TWICE DAILY, Disp: 180 tablet, Rfl: 0     metoprolol (LOPRESSOR) 25 MG tablet, TAKE 1 AND 1/2 TABLETS(37.5 MG) BY MOUTH TWICE DAILY, Disp: 135 tablet, Rfl: 0     flunisolide (NASALIDE) 25 MCG/ACT (0.025%) SOLN spray, Spray 2 sprays into both nostrils 2 times daily, Disp: 1 Bottle, Rfl:  3     diclofenac (VOLTAREN) 1 % GEL, APPLY 4 GRAMS TO KNEES OR 2 GRAMS TO HANDS FOUR TIMES DAILY USING ENCLOSED DOSING CARD., Disp: 100 g, Rfl: 3     esomeprazole (NEXIUM) 40 MG capsule, Take 1 capsule (40 mg) by mouth every morning (before breakfast) Take 30-60 minutes before a eating., Disp: 30 capsule, Rfl: 11     metoprolol (LOPRESSOR) 25 MG tablet, Take 1 tablet (25 mg) by mouth 2 times daily, Disp: 180 tablet, Rfl: 2     calcium carbonate (TUMS) 500 MG chewable tablet, Take 1 tablet (500 mg) by mouth every 6 hours as needed for heartburn, Disp: 90 tablet, Rfl:      glucosamine-chondroitin 500-400 MG CAPS, Take 1 capsule by mouth 3 times daily, Disp: , Rfl:      acetaminophen (TYLENOL) 325 MG tablet, Take 1-2 tablets (325-650 mg) by mouth every 6 hours as needed for mild pain Take 2 tabs (1000 mg) as needed three times daily, Disp: , Rfl:      ORDER FOR DME, Equipment being ordered: Shower Chair, Disp: 1 each, Rfl: 0     ORDER FOR DME, Equipment being ordered: Bath transfer bench Fax to:[phone], Disp: 1 Device, Rfl: 0     ORDER FOR DME, Equipment being ordered: transfer bath bench, Disp: 1 Device, Rfl: 0     multivitamin, therapeutic with minerals (MULTI-VITAMIN) TABS, Take 1 tablet by mouth daily., Disp: , Rfl:      ALLERGIES:    Allergies   Allergen Reactions     Lorazepam Visual Disturbance     Severe hallucinations;        SOCIAL HISTORY:   Social History     Social History     Marital status: Single     Spouse name: N/A     Number of children: N/A     Years of education: N/A     Occupational History     Not on file.     Social History Main Topics     Smoking status: Former Smoker     Quit date: 7/25/1983     Smokeless tobacco: Never Used     Alcohol use No     Drug use: No     Sexual activity: No     Other Topics Concern     Caffeine Concern No     coffee: 1/2 cup in the morning     Sleep Concern No     Stress Concern No     Weight Concern No     Special Diet No     Exercise Yes     stationary bike  daily, walking around condo     Seat Belt Yes     Social History Narrative    No longer  - Living in a condo - looking to move to a more senior living    3 children - Chica living in [facility], Basil lives in [facility] in the [place]        FAMILY HISTORY:   Family History   Problem Relation Age of Onset     HEART DISEASE No family hx of      Arrhythmia No family hx of         EXAM:Vitals: /77 (BP Location: Right arm, Patient Position: Chair, Cuff Size: Adult Large)  Pulse 106  Ht 5' (1.524 m)  Wt 164 lb (74.4 kg)  BMI 32.03 kg/m2  BMI= Body mass index is 32.03 kg/(m^2).    General appearance: Patient is alert and fully cooperative with history & exam.  No sign of distress is noted during the visit.     Psychiatric: Affect is pleasant & appropriate.  Patient appears motivated to improve health.  Seems a bit forgetful during encounter.     Respiratory: Breathing is regular & unlabored while sitting.     HEENT: Hearing is intact to spoken word.  Speech is clear.  No gross evidence of visual impairment that would impact ambulation.     Dermatologic:  B/l toenails thickened, dystrophic, discolored, mildly elongated. Skin is intact to both feet without significant lesions, rash or abrasion.  No paronychia or evidence of soft tissue infection is noted.  Atrophic skin to feet b/l.     Vascular: DP & PT pulses are intact & regular bilaterally.  CFT and skin temperature are normal to both lower extremities.     Neurologic: Lower extremity sensation is intact to light touch.  No evidence of weakness or contracture in the lower extremities.  No evidence of neuropathy.     Musculoskeletal: Lesser hammertoes b/l.  Patient is ambulatory with a walker.  No gross ankle deformity noted.  No foot or ankle joint effusion is noted.     ASSESSMENT: b/l onychomycosis     PLAN:  Reviewed patient's chart in epic.  Discussed condition and treatment options including pros and cons.    Discussed causes of nail fungus.   Discussed treatment options with patient and explained that there isn't one treatment that is 100% effective.  Debridement is an option.  Discussed oral lamisil which is the most effective at about 70% but can have liver effects.  Discussed topicals, which are less effective.  Discussed over the counter antifungal creams.  Discussed Vicks once/day.   Also talked about prescription topicals, which have similar efficacy.  Discussed that if it becomes painful, we can remove the nail in clinic.      I offered nail debridement today.  I explained it may not be covered under insurance.  ABN was signed.  I explained that we normally don't do routine foot care/nail trimming.  Offered alternative nail care services as a less costly option.  Pt declined nail debridement and elected to proceed with a nail care service.  List given.  Handout given on nail fungus.    Advised wider deeper shoes to avoid pressure on nails/toes for comfort.    F/u prn.    Ramone Cabrera, SYDNIE, FACFAS

## 2017-08-01 NOTE — NURSING NOTE
Chief Complaint   Patient presents with     Toenail     Need toenails trimmed       Initial /77 (BP Location: Right arm, Patient Position: Chair, Cuff Size: Adult Large)  Pulse 106  Ht 5' (1.524 m)  Wt 164 lb (74.4 kg)  BMI 32.03 kg/m2 Estimated body mass index is 32.03 kg/(m^2) as calculated from the following:    Height as of this encounter: 5' (1.524 m).    Weight as of this encounter: 164 lb (74.4 kg).  Medication Reconciliation: unable or not appropriate to perform    ALEX Dawn MA August 1, 2017 3:24 PM

## 2017-08-01 NOTE — PROGRESS NOTES
Weight management plan: Patient was referred to their PCP to discuss a diet and exercise plan.     ALEX Dawn MA August 1, 2017 3:25 PM

## 2017-08-01 NOTE — MR AVS SNAPSHOT
After Visit Summary   8/1/2017    Sintia Connors    MRN: [id number]           Patient Information     Date Of Birth          1935        Visit Information        Provider Department      8/1/2017 3:00 PM Ramone Cabrera DPM [facility]        Care Instructions    NAIL FUNGUS / ONYCHOMYCOSIS   Nail fungus is not a hygiene problem and will not likely lead to significant medical   problems. The nails may get thick causing pain and possibly local skin infection.   Treatments include debridement (trimming), oral antifungals, topical antifungals and complete removal of the nail. Most fungal nails are not treated.   Topicals such as tea tree oil can be helpful for surface fungus and may, at best, limit   progression. Over the counter creams (such as Lamisil) can also be used however, their effectiveness is also quite low.  Topical treatment with Pen lac is expensive and often not covered by insurance. Pen lac has an approximate 8% success rate. Topical therapy recommendations is to apply twice a day for at least 3-4 months as it takes 9 months for new nail to grow out.    Experts suggest soaking your feet for 15 to 20 minutes in a mixture of 1 cup vinegar to 4 cups warm water. Be sure to rinse well and pat your feet dry when you're done. You can soak your feet like this daily. But if your skin becomes irritated, try soaking only two to three times a week. Vicks VapoRub, as with vinegar, there have been no controlled clinical trials to assess the effectiveness of Vicks VapoRub on nail fungus, but there have been numerous anecdotal reports that it works. There's no consensus on how often to apply this product, so check with your doctor before using it on your nails.     Oral therapies include Sporanox and Lamisil. Oral therapies are also expensive and not very effective. Side effects such as liver disease are the main concern. Return of fungus is common even if the treatment worked.      Other Tips:  - Penlac nail medication apply daily x 4 months; remove old polish first day of each week  - Antifungal cream/powder (Zeasorb) - apply daily to feet and shoes x 2 months  - Clean shoes with Lysol or in washing machine every few weeks  - Rotate shoe gear; give them 24 hours to dry out between days wearing them  - Clean pair of socks in morning, clean pair in afternoon if your feet sweat  - Shower shoes used in public showers/pools    FOOT CARE NURSES  If you are interested in having a foot care nurse come out to your   home, please call one of these contacts for more information:  [facility]  [phone] [facility]  [phone]   [facility]  [phone]  [facility] [phone]  [place]   [facility]  [phone]  At [facility] [phone]                 Follow-ups after your visit        Who to contact     If you have questions or need follow up information about today's clinic visit or your schedule please contact [facility] directly at [phone].  Normal or non-critical lab and imaging results will be communicated to you by MobiTXhart, letter or phone within 4 business days after the clinic has received the results. If you do not hear from us within 7 days, please contact the clinic through MobiTXhart or phone. If you have a critical or abnormal lab result, we will notify you by phone as soon as possible.  Submit refill requests through [employer] or call your pharmacy and they will forward the refill request to us. Please allow 3 business days for your refill to be completed.          Additional Information About Your Visit        [employer] Information     [facility] gives you secure access to your electronic health record. If you see a primary care provider, you can also send messages to your care team and make appointments. If you have questions, please call your primary care clinic.  If you do not  have a primary care provider, please call [phone] and they will assist you.        Care EveryWhere ID     This is your Care EveryWhere ID. This could be used by other organizations to access your [facility] medical records  [id number]        Your Vitals Were     Pulse Height BMI (Body Mass Index)             106 5' (1.524 m) 32.03 kg/m2          Blood Pressure from Last 3 Encounters:   08/01/17 146/77   06/20/17 136/72   03/07/17 126/74    Weight from Last 3 Encounters:   08/01/17 164 lb (74.4 kg)   06/20/17 164 lb (74.4 kg)   03/07/17 171 lb (77.6 kg)              Today, you had the following     No orders found for display       Primary Care Provider Office Phone # Fax #    Jaxson Osuna [phone]       [facility] [address] [facility] [address]  [facility] [address]        Equal Access to Services     YUDY MASTERS : Hadii aad ku hadasho Soomaali, waaxda luqadaha, qaybta kaalmada adeegyada, waxay idiin hayhomern toy villarreal . So [facility] [phone].    ATENCIÓN: Si habla español, tiene a agarwal disposición servicios gratuitos de asistencia lingüística. Llame al [phone].    We comply with applicable federal civil rights laws and [place] laws. We do not discriminate on the basis of race, color, national origin, age, disability sex, sexual orientation or gender identity.            Thank you!     Thank you for choosing [facility]  for your care. Our goal is always to provide you with excellent care. Hearing back from our patients is one way we can continue to improve our services. Please take a few minutes to complete the written survey that you may receive in the mail after your visit with us. Thank you!             Your Updated Medication List - Protect others around you: Learn how to safely use, store and throw away your medicines at www.disposemymeds.org.          This list is accurate as of: 8/1/17  3:47 PM.  Always use your most recent med list.                    Brand Name Dispense Instructions for use Diagnosis    acetaminophen 325 MG tablet    TYLENOL     Take 1-2 tablets (325-650 mg) by mouth every 6 hours as needed for mild pain Take 2 tabs (1000 mg) as needed three times daily    Primary osteoarthritis of right knee       calcium carbonate 500 MG chewable tablet    TUMS    90 tablet    Take 1 tablet (500 mg) by mouth every 6 hours as needed for heartburn        diclofenac 1 % Gel topical gel    VOLTAREN    100 g    APPLY 4 GRAMS TO KNEES OR 2 GRAMS TO HANDS FOUR TIMES DAILY USING ENCLOSED DOSING CARD.    Other secondary osteoarthritis of knee       esomeprazole 40 MG CR capsule    nexIUM    30 capsule    Take 1 capsule (40 mg) by mouth every morning (before breakfast) Take 30-60 minutes before a eating.    Gastroesophageal reflux disease with esophagitis, Abdominal pain, epigastric       flunisolide 25 MCG/ACT (0.025%) Soln spray    NASALIDE    1 Bottle    Spray 2 sprays into both nostrils 2 times daily    Nasal congestion       glucosamine-chondroitin 500-400 MG Caps per capsule      Take 1 capsule by mouth 3 times daily        levETIRAcetam 750 MG tablet    KEPPRA    180 tablet    Take 1 tablet (750 mg) by mouth 2 times daily    Seizure (H)       * metoprolol 25 MG tablet    LOPRESSOR    180 tablet    Take 1 tablet (25 mg) by mouth 2 times daily    Paroxysmal atrial fibrillation (H)       * metoprolol 25 MG tablet    LOPRESSOR    135 tablet    TAKE 1 AND 1/2 TABLETS(37.5 MG) BY MOUTH TWICE DAILY    Paroxysmal atrial fibrillation (H)       Multi-vitamin Tabs tablet      Take 1 tablet by mouth daily.        * order for DME     1 Device    Equipment being ordered: transfer bath bench    Osteoarthritis       * order for DME     1 Device    Equipment being ordered: Bath transfer bench Fax to:[phone]    Osteoarthritis       * order for DME     1 each    Equipment being ordered: Shower Chair    Osteoarthritis of knee       QUEtiapine 25 MG tablet    SEROquel    180  tablet    TAKE 1 TABLET(25 MG) BY MOUTH TWICE DAILY    Anxiety       Vitamin D-3 1000 UNITS Caps           warfarin 3 MG tablet    COUMADIN    110 tablet    TAKE 1 TO 1& 1/2 TABLETS(3 TO 4.5MG) BY MOUTH EVERY DAY AS DIRECTED BY INR CLINIC    Atrial fibrillation (H)       * Notice:  This list has 5 medication(s) that are the same as other medications prescribed for you. Read the directions carefully, and ask your doctor or other care provider to review them with you.

## 2017-08-01 NOTE — PATIENT INSTRUCTIONS
NAIL FUNGUS / ONYCHOMYCOSIS   Nail fungus is not a hygiene problem and will not likely lead to significant medical   problems. The nails may get thick causing pain and possibly local skin infection.   Treatments include debridement (trimming), oral antifungals, topical antifungals and complete removal of the nail. Most fungal nails are not treated.   Topicals such as tea tree oil can be helpful for surface fungus and may, at best, limit   progression. Over the counter creams (such as Lamisil) can also be used however, their effectiveness is also quite low.  Topical treatment with Pen lac is expensive and often not covered by insurance. Pen lac has an approximate 8% success rate. Topical therapy recommendations is to apply twice a day for at least 3-4 months as it takes 9 months for new nail to grow out.    Experts suggest soaking your feet for 15 to 20 minutes in a mixture of 1 cup vinegar to 4 cups warm water. Be sure to rinse well and pat your feet dry when you're done. You can soak your feet like this daily. But if your skin becomes irritated, try soaking only two to three times a week. Vicks VapoRub, as with vinegar, there have been no controlled clinical trials to assess the effectiveness of Vicks VapoRub on nail fungus, but there have been numerous anecdotal reports that it works. There's no consensus on how often to apply this product, so check with your doctor before using it on your nails.     Oral therapies include Sporanox and Lamisil. Oral therapies are also expensive and not very effective. Side effects such as liver disease are the main concern. Return of fungus is common even if the treatment worked.     Other Tips:  - Penlac nail medication apply daily x 4 months; remove old polish first day of each week  - Antifungal cream/powder (Zeasorb)   apply daily to feet and shoes x 2 months  - Clean shoes with Lysol or in washing machine every few weeks  - Rotate shoe gear; give them 24 hours to dry out  between days wearing them  - Clean pair of socks in morning, clean pair in afternoon if your feet sweat  - Shower shoes used in public showers/pools    FOOT CARE NURSES  If you are interested in having a foot care nurse come out to your   home, please call one of these contacts for more information:  [facility]  [phone] [facility]  [phone]   [facility]  [phone]  [facility] [phone]  [place]   [facility]  [phone]  At [facility] [phone]

## 2017-08-05 ENCOUNTER — HEALTH MAINTENANCE LETTER (OUTPATIENT)
Age: 82
End: 2017-08-05

## 2017-08-16 ENCOUNTER — TELEPHONE (OUTPATIENT)
Dept: FAMILY MEDICINE | Facility: CLINIC | Age: 82
End: 2017-08-16

## 2017-08-16 DIAGNOSIS — E55.9 VITAMIN D DEFICIENCY: ICD-10-CM

## 2017-08-16 DIAGNOSIS — E53.8 VITAMIN B12 DEFICIENCY WITHOUT ANEMIA: Primary | ICD-10-CM

## 2017-08-16 NOTE — TELEPHONE ENCOUNTER
To PCP:      Vitamin D 1000 units and Vitamin B Complex are not on patients current med list but per below message home care wondering if you are wondering to have patient continue these?     Medications prepped in   - please review diagnoses associated with medications.     Elisa JOHNSON RN

## 2017-08-16 NOTE — TELEPHONE ENCOUNTER
Reason for Call:  Other jassi from [facility] calling    Detailed comments: when patient was in tcu she was prescribed vitamin D 1000 and vitamin b complex 1time daily patient is almost out of theses wonders if Dr Osuna wants to continue these? Uses [facility] in [place] [address] [place] [address]   Phone Number Patient can be reached at: [phone]    Best Time: any    Can we leave a detailed message on this number? YES    Call taken on 8/16/2017 at 11:20 AM by Chinyere Santoro

## 2017-08-22 RX ORDER — CHOLECALCIFEROL (VITAMIN D3) 25 MCG
1 CAPSULE ORAL DAILY
Qty: 90 CAPSULE | Refills: 3 | Status: SHIPPED | OUTPATIENT
Start: 2017-08-22 | End: 2018-10-03

## 2017-09-08 NOTE — TELEPHONE ENCOUNTER
For PCP   Is this pt to continue to be off coumadin, please document and advise to triage nurses.    Pt has open anticoagulation episode, and we need to close if pt is no longer going to be on coumadin.      Please advise.  Poornima Sebastian RN

## 2017-09-08 NOTE — TELEPHONE ENCOUNTER
If the patient is an off Coumadin permanently she is off of it for at least 6 months which should be reevaluated him and of October or beginning of November.

## 2017-09-18 ENCOUNTER — TRANSFERRED RECORDS (OUTPATIENT)
Dept: HEALTH INFORMATION MANAGEMENT | Facility: CLINIC | Age: 82
End: 2017-09-18

## 2017-09-21 DIAGNOSIS — Z53.9 DIAGNOSIS NOT YET DEFINED: Primary | ICD-10-CM

## 2017-09-21 PROCEDURE — G0179 MD RECERTIFICATION HHA PT: HCPCS | Performed by: INTERNAL MEDICINE

## 2017-09-26 ENCOUNTER — HOSPITAL ENCOUNTER (OUTPATIENT)
Dept: CT IMAGING | Facility: CLINIC | Age: 82
Discharge: HOME | End: 2017-09-26
Attending: PSYCHIATRY & NEUROLOGY | Admitting: PSYCHIATRY & NEUROLOGY
Payer: MEDICARE

## 2017-09-26 DIAGNOSIS — G40.309 EPILEPSY, GENERALIZED, CONVULSIVE (H): ICD-10-CM

## 2017-09-26 DIAGNOSIS — I61.1 NONTRAUMATIC CORTICAL HEMORRHAGE OF LEFT CEREBRAL HEMISPHERE (H): ICD-10-CM

## 2017-09-26 DIAGNOSIS — D32.0 BENIGN NEOPLASM OF CEREBRAL MENINGES (H): ICD-10-CM

## 2017-09-26 PROCEDURE — 70450 CT HEAD/BRAIN W/O DYE: CPT

## 2017-09-27 ENCOUNTER — TRANSFERRED RECORDS (OUTPATIENT)
Dept: HEALTH INFORMATION MANAGEMENT | Facility: CLINIC | Age: 82
End: 2017-09-27

## 2017-09-29 ENCOUNTER — TELEPHONE (OUTPATIENT)
Dept: FAMILY MEDICINE | Facility: CLINIC | Age: 82
End: 2017-09-29

## 2017-09-29 NOTE — TELEPHONE ENCOUNTER
TC,    Please call pt and advise OV is needed/schedule in November to review coumadin and other medications, and ongoing plan with PCP.  Poornima Sebastian RN

## 2017-10-02 NOTE — TELEPHONE ENCOUNTER
I called Pt who asked me to call Pérez to schedule. LVM with Pérez Connors to call  And schedule Pt with Dr. Osuna

## 2017-10-24 ENCOUNTER — TELEPHONE (OUTPATIENT)
Dept: FAMILY MEDICINE | Facility: CLINIC | Age: 82
End: 2017-10-24

## 2017-10-24 NOTE — TELEPHONE ENCOUNTER
Reason for Call:  Switching back to Dr. Gupta from Dr. Osuna    Detailed comments: Pérez (son/EC) is calling because Sintia Connors would like to go back to having Dr. Gupta as her provider instead of Dr. Osuna. Sintia F Dory was a patient of Dr. Lopes for years. .Please give Pérez (son/EC) a call.     Phone Number Patient can be reached at: Other phone number:  [phone] - Pérez's cell. Sintia Connors is not very good at answering the phone.     Best Time: ASAP     Can we leave a detailed message on this number? YES    Call taken on 10/24/2017 at 1:09 PM by Gaviota Tijerina

## 2017-10-25 NOTE — TELEPHONE ENCOUNTER
OK to schedule    I called and left a message on Pérez's voice mail - can we schedule an office visit when Pérez will be in town and could attend the appointment     Lazaro Gupta MD

## 2017-10-26 ENCOUNTER — TELEPHONE (OUTPATIENT)
Dept: FAMILY MEDICINE | Facility: CLINIC | Age: 82
End: 2017-10-26

## 2017-10-26 NOTE — TELEPHONE ENCOUNTER
I called and spoke with Tony with [employer]. Since there is no documentation in the patient's chart about receiving and completing this form I have no way of knowing what exactly happened with this form back in early October. I asked that Tony resend this form to us and I will try to keep an eye out for this.    Giovanni Peres, [facility]

## 2017-10-26 NOTE — TELEPHONE ENCOUNTER
Reason for Call:  Other Order sent on 10/4 for medication.    Detailed comments: [employer] states the order is missing a signature  And needs to be faxed over again.    Ph. [phone]  Fax: [phone]    Best Time: Anytime    Can we leave a detailed message on this number? YES    Call taken on 10/26/2017 at 9:06 AM by Tere Ruelas

## 2017-10-26 NOTE — TELEPHONE ENCOUNTER
I got the form from [facility]. I brought to Dr Osuna, he signed, and I faxed back to [facility] just now.    Giovanni Peres, CMA

## 2017-11-10 ENCOUNTER — OFFICE VISIT (OUTPATIENT)
Dept: FAMILY MEDICINE | Facility: CLINIC | Age: 82
End: 2017-11-10
Payer: COMMERCIAL

## 2017-11-10 VITALS
OXYGEN SATURATION: 92 % | WEIGHT: 164 LBS | SYSTOLIC BLOOD PRESSURE: 117 MMHG | DIASTOLIC BLOOD PRESSURE: 60 MMHG | HEIGHT: 60 IN | BODY MASS INDEX: 32.2 KG/M2 | TEMPERATURE: 98.4 F | HEART RATE: 94 BPM

## 2017-11-10 DIAGNOSIS — I63.9 CEREBROVASCULAR ACCIDENT (CVA), UNSPECIFIED MECHANISM (H): Primary | ICD-10-CM

## 2017-11-10 DIAGNOSIS — D50.9 IRON DEFICIENCY ANEMIA, UNSPECIFIED IRON DEFICIENCY ANEMIA TYPE: ICD-10-CM

## 2017-11-10 DIAGNOSIS — Z13.6 CARDIOVASCULAR SCREENING; LDL GOAL LESS THAN 160: ICD-10-CM

## 2017-11-10 DIAGNOSIS — R56.9 SEIZURE (H): ICD-10-CM

## 2017-11-10 LAB
ERYTHROCYTE [DISTWIDTH] IN BLOOD BY AUTOMATED COUNT: 14.8 % (ref 10–15)
HCT VFR BLD AUTO: 46.6 % (ref 35–47)
HGB BLD-MCNC: 14.7 G/DL (ref 11.7–15.7)
MCH RBC QN AUTO: 28.5 PG (ref 26.5–33)
MCHC RBC AUTO-ENTMCNC: 31.5 G/DL (ref 31.5–36.5)
MCV RBC AUTO: 91 FL (ref 78–100)
PLATELET # BLD AUTO: 241 10E9/L (ref 150–450)
RBC # BLD AUTO: 5.15 10E12/L (ref 3.8–5.2)
WBC # BLD AUTO: 11.4 10E9/L (ref 4–11)

## 2017-11-10 PROCEDURE — 36415 COLL VENOUS BLD VENIPUNCTURE: CPT | Performed by: INTERNAL MEDICINE

## 2017-11-10 PROCEDURE — 80061 LIPID PANEL: CPT | Performed by: INTERNAL MEDICINE

## 2017-11-10 PROCEDURE — 99214 OFFICE O/P EST MOD 30 MIN: CPT | Performed by: INTERNAL MEDICINE

## 2017-11-10 PROCEDURE — 85027 COMPLETE CBC AUTOMATED: CPT | Performed by: INTERNAL MEDICINE

## 2017-11-10 RX ORDER — MIRTAZAPINE 15 MG/1
15 TABLET, FILM COATED ORAL
COMMUNITY
Start: 2015-04-12 | End: 2017-11-22

## 2017-11-10 NOTE — MR AVS SNAPSHOT
After Visit Summary   11/10/2017    Sintia Connors    MRN: [id number]           Patient Information     Date Of Birth          1935        Visit Information        Provider Department      11/10/2017 2:30 PM Lazaro Gupta MD [facility]        Today's Diagnoses     Cerebrovascular accident (CVA), unspecified mechanism (H)    -  1    CARDIOVASCULAR SCREENING; LDL GOAL LESS THAN 160        Seizure (H)        Iron deficiency anemia, unspecified iron deficiency anemia type          Care Instructions    (I63.9) Cerebrovascular accident (CVA), unspecified mechanism (H)  (primary encounter diagnosis)  Comment: Conitnue aspirin  - note that warfarin was discontiued following hermorrhagic stroke.  We will also plan to follow up in neurology  Plan:     (Z13.6) CARDIOVASCULAR SCREENING; LDL GOAL LESS THAN 160  Comment: Check fasting lipid panel today   Plan: Lipid panel reflex to direct LDL Fasting            (R56.9) Seizure (H)  Comment: Continue Keppra - follow up in neurology   Plan:     (D50.9) Iron deficiency anemia, unspecified iron deficiency anemia type  Comment: check complete blood counts   Plan: CBC with platelets                     Follow-ups after your visit        Who to contact     If you have questions or need follow up information about today's clinic visit or your schedule please contact [facility] directly at [phone].  Normal or non-critical lab and imaging results will be communicated to you by MyChart, letter or phone within 4 business days after the clinic has received the results. If you do not hear from us within 7 days, please contact the clinic through MyChart or phone. If you have a critical or abnormal lab result, we will notify you by phone as soon as possible.  Submit refill requests through [employer] or call your pharmacy and they will forward the refill request to us. Please allow 3 business days for your refill to be completed.           Additional Information About Your Visit        [employer] Information     [employer] gives you secure access to your electronic health record. If you see a primary care provider, you can also send messages to your care team and make appointments. If you have questions, please call your primary care clinic.  If you do not have a primary care provider, please call [phone] and they will assist you.        Care EveryWhere ID     This is your Care EveryWhere ID. This could be used by other organizations to access your [facility] medical records  [id number]        Your Vitals Were     Pulse Temperature Height Pulse Oximetry BMI (Body Mass Index)       94 98.4  F (36.9  C) (Oral) 5' (1.524 m) 92% 32.03 kg/m2        Blood Pressure from Last 3 Encounters:   11/10/17 117/60   08/01/17 146/77   06/20/17 136/72    Weight from Last 3 Encounters:   11/10/17 164 lb (74.4 kg)   08/01/17 164 lb (74.4 kg)   06/20/17 164 lb (74.4 kg)              We Performed the Following     CBC with platelets     Lipid panel reflex to direct LDL Fasting          Today's Medication Changes          These changes are accurate as of: 11/10/17  2:52 PM.  If you have any questions, ask your nurse or doctor.               These medicines have changed or have updated prescriptions.        Dose/Directions    metoprolol 25 MG tablet   Commonly known as:  LOPRESSOR   This may have changed:  Another medication with the same name was removed. Continue taking this medication, and follow the directions you see here.   Used for:  Paroxysmal atrial fibrillation (H)   Changed by:  Lazaro Gupta MD        TAKE 1 AND 1/2 TABLETS(37.5 MG) BY MOUTH TWICE DAILY   Quantity:  270 tablet   Refills:  3         Stop taking these medicines if you haven't already. Please contact your care team if you have questions.     warfarin 3 MG tablet   Commonly known as:  COUMADIN   Stopped by:  Lazaro Gupta MD                    Primary Care Provider Office Phone  # Fax #    Lazaro Gupta [phone] [address] [facility] [address]  [facility] [address]        Equal Access to Services     YUDY MASTERS : Hadeitan felipe maharaj jodieo Sonils, waaxda luqadaha, qaybta kaalmada shaggy, morgan villafana laDietervictor manuel castrejon. So [facility] [phone].    ATENCIÓN: Si habla español, tiene a agarwal disposición servicios gratuitos de asistencia lingüística. Llame al [phone].    We comply with applicable federal civil rights laws and [place] laws. We do not discriminate on the basis of race, color, national origin, age, disability, sex, sexual orientation, or gender identity.            Thank you!     Thank you for choosing [facility]  for your care. Our goal is always to provide you with excellent care. Hearing back from our patients is one way we can continue to improve our services. Please take a few minutes to complete the written survey that you may receive in the mail after your visit with us. Thank you!             Your Updated Medication List - Protect others around you: Learn how to safely use, store and throw away your medicines at www.disposemymeds.org.          This list is accurate as of: 11/10/17  2:52 PM.  Always use your most recent med list.                   Brand Name Dispense Instructions for use Diagnosis    acetaminophen 325 MG tablet    TYLENOL     Take 1-2 tablets (325-650 mg) by mouth every 6 hours as needed for mild pain Take 2 tabs (1000 mg) as needed three times daily    Primary osteoarthritis of right knee       ASPIRIN PO      Take 81 mg by mouth daily        calcium carbonate 500 MG chewable tablet    TUMS    90 tablet    Take 1 tablet (500 mg) by mouth every 6 hours as needed for heartburn        diclofenac 1 % Gel topical gel    VOLTAREN    100 g    APPLY 4 GRAMS TO KNEES OR 2 GRAMS TO HANDS FOUR TIMES DAILY USING ENCLOSED DOSING CARD.    Other secondary osteoarthritis of knee       esomeprazole 40 MG CR capsule    nexIUM     30 capsule    Take 1 capsule (40 mg) by mouth every morning (before breakfast) Take 30-60 minutes before a eating.    Gastroesophageal reflux disease with esophagitis, Abdominal pain, epigastric       flunisolide 25 MCG/ACT (0.025%) Soln spray    NASALIDE    1 Bottle    Spray 2 sprays into both nostrils 2 times daily    Nasal congestion       glucosamine-chondroitin 500-400 MG Caps per capsule      Take 1 capsule by mouth 3 times daily        levETIRAcetam 750 MG tablet    KEPPRA    180 tablet    Take 1 tablet (750 mg) by mouth 2 times daily    Seizure (H)       metoprolol 25 MG tablet    LOPRESSOR    270 tablet    TAKE 1 AND 1/2 TABLETS(37.5 MG) BY MOUTH TWICE DAILY    Paroxysmal atrial fibrillation (H)       mirtazapine 15 MG tablet    REMERON     Take 15 mg by mouth        Multi-vitamin Tabs tablet      Take 1 tablet by mouth daily.        * order for DME     1 Device    Equipment being ordered: transfer bath bench    Osteoarthritis       * order for DME     1 Device    Equipment being ordered: Bath transfer bench Fax to:[phone]    Osteoarthritis       * order for DME     1 each    Equipment being ordered: Shower Chair    Osteoarthritis of knee       QUEtiapine 25 MG tablet    SEROquel    180 tablet    TAKE 1 TABLET(25 MG) BY MOUTH TWICE DAILY    Anxiety       vitamin B-Complex     90 tablet    Take 1 tablet by mouth daily    Vitamin B12 deficiency without anemia       Vitamin D-3 1000 UNITS Caps     90 capsule    Take 1 tablet by mouth daily    Vitamin D deficiency       * Notice:  This list has 3 medication(s) that are the same as other medications prescribed for you. Read the directions carefully, and ask your doctor or other care provider to review them with you.

## 2017-11-10 NOTE — PATIENT INSTRUCTIONS
(I63.9) Cerebrovascular accident (CVA), unspecified mechanism (H)  (primary encounter diagnosis)  Comment: Conitnue aspirin  - note that warfarin was discontiued following hermorrhagic stroke.  We will also plan to follow up in neurology  Plan:     (Z13.6) CARDIOVASCULAR SCREENING; LDL GOAL LESS THAN 160  Comment: Check fasting lipid panel today   Plan: Lipid panel reflex to direct LDL Fasting            (R56.9) Seizure (H)  Comment: Continue Keppra - follow up in neurology   Plan:     (D50.9) Iron deficiency anemia, unspecified iron deficiency anemia type  Comment: check complete blood counts   Plan: CBC with platelets

## 2017-11-10 NOTE — NURSING NOTE
Chief Complaint   Patient presents with     RECHECK       Initial /60 (BP Location: Left arm, Cuff Size: Adult Regular)  Pulse 94  Temp 98.4  F (36.9  C) (Oral)  Ht 5' (1.524 m)  Wt 164 lb (74.4 kg)  SpO2 92%  BMI 32.03 kg/m2 Estimated body mass index is 32.03 kg/(m^2) as calculated from the following:    Height as of this encounter: 5' (1.524 m).    Weight as of this encounter: 164 lb (74.4 kg).  Medication Reconciliation: complete     Nanda Briggs MA

## 2017-11-10 NOTE — PROGRESS NOTES
[facility]  CLINIC PROGRESS NOTE    Subjective:  Atrial fibrillation   Sintia Connors returns to the clinic for follow up of her known history of paroxysmal atrial fibrillation.  She had a stroke this past summer with symptoms of aphasia and has been regaining her speech slowly since then.  She is nearly back to her baseline.   She did have follow up in neurology and had a head CT that showed no additional bleeding.  For stroke prevention she was recommended to start aspirin 81 mg for prevention and avoid warfarin.    Seizure   SHe continues on keppra 750 mg twice per day an has had follow up in neurology.    Dementia   There has been noted increased agitation over the past few months and she has continue on seroqeul 25 mg daily.  She feels that her memory is good today.  She Is not driving currently.        Past medical history, medications, allergies, social history, family history reviewed and updated in [facility] as of 11/10/2017 .    ROS  CONSTITUTIONAL: no fatigue, no unexpected change in weight  SKIN: no worrisome rashes, no worrisome moles, no worrisome lesions  EYES: no acute vision problems or changes  ENT: no ear problems, no mouth problems, no throat problems  RESP: no significant cough, no shortness of breath  CV: no chest pain, no palpitations, no new or worsening peripheral edema  GI: no nausea, no vomiting, no constipation, no diarrhea  : no frequency, no dysuria, no hematuria  MS: no claudication, no myalgias, no joint aches  PSYCHIATRIC: no changes in mood or affect      Objective:  Vitals  /60 (BP Location: Left arm, Cuff Size: Adult Regular)  Pulse 94  Temp 98.4  F (36.9  C) (Oral)  Ht 5' (1.524 m)  Wt 164 lb (74.4 kg)  SpO2 92%  BMI 32.03 kg/m2  GEN: Alert Oriented x3 NAD  HEENT: Atraumatic, normocephalic, neck supple, no thyromegaly, negative cervical adenopathy  TM: TM bilaterally pearly and grey with normal light reflex  CV: RRR no murmurs or rubs  PULM: CTA no wheezes or  crackles  ABD: Soft, nontender nondistended, no hepatosplenomegally  SKIN: No visible skin lesion or ulcerations  EXT:  No edema bilateral lower extremities  NEURO: Gait and station deferred, No focal neurologic deficits  PSYCH: Mood good, affect mood congruent    Results for orders placed or performed in visit on 11/10/17 (from the past 24 hour(s))   CBC with platelets   Result Value Ref Range    WBC 11.4 (H) 4.0 - 11.0 10e9/L    RBC Count 5.15 3.8 - 5.2 10e12/L    Hemoglobin 14.7 11.7 - 15.7 g/dL    Hematocrit 46.6 35.0 - 47.0 %    MCV 91 78 - 100 fl    MCH 28.5 26.5 - 33.0 pg    MCHC 31.5 31.5 - 36.5 g/dL    RDW 14.8 10.0 - 15.0 %    Platelet Count 241 150 - 450 10e9/L       Assessment/Plan:  Patient Instructions   (I63.9) Cerebrovascular accident (CVA), unspecified mechanism (H)  (primary encounter diagnosis)  Comment: Conitnue aspirin  - note that warfarin was discontiued following hermorrhagic stroke.  We will also plan to follow up in neurology  Plan:     (Z13.6) CARDIOVASCULAR SCREENING; LDL GOAL LESS THAN 160  Comment: Check fasting lipid panel today   Plan: Lipid panel reflex to direct LDL Fasting            (R56.9) Seizure (H)  Comment: Continue Keppra - follow up in neurology   Plan:     (D50.9) Iron deficiency anemia, unspecified iron deficiency anemia type  Comment: check complete blood counts   Plan: CBC with platelets               Follow up in 3 months    Disclaimer: This note consists of symbols derived from keyboarding, dictation and/or voice recognition software. As a result, there may be errors in the script that have gone undetected. Please consider this when interpreting information found in this chart.    Lazaro Gupta MD  [phone]

## 2017-11-11 LAB
CHOLEST SERPL-MCNC: 196 MG/DL
HDLC SERPL-MCNC: 44 MG/DL
LDLC SERPL CALC-MCNC: 125 MG/DL
NONHDLC SERPL-MCNC: 152 MG/DL
TRIGL SERPL-MCNC: 135 MG/DL

## 2017-11-12 NOTE — PROGRESS NOTES
Conrad Patricio,    I have had the opportunity to review your recent results and an interpretation is as follows:  Your complete blood counts returned within normal limits but slightly elevated white blood cell count - I would recommend rechecking this again at our next office visit   Your fasting lipid panel returned stable as well     Sincerely,  Lazaro Gupta MD

## 2017-11-22 DIAGNOSIS — R10.13 ABDOMINAL PAIN, EPIGASTRIC: ICD-10-CM

## 2017-11-22 DIAGNOSIS — M15.0 PRIMARY OSTEOARTHRITIS INVOLVING MULTIPLE JOINTS: ICD-10-CM

## 2017-11-22 DIAGNOSIS — K21.00 GASTROESOPHAGEAL REFLUX DISEASE WITH ESOPHAGITIS: ICD-10-CM

## 2017-11-22 DIAGNOSIS — F41.9 ANXIETY: Primary | ICD-10-CM

## 2017-11-22 RX ORDER — ESOMEPRAZOLE MAGNESIUM 40 MG/1
40 CAPSULE, DELAYED RELEASE ORAL
Qty: 30 CAPSULE | Refills: 11 | Status: SHIPPED | OUTPATIENT
Start: 2017-11-22 | End: 2018-11-06

## 2017-11-22 RX ORDER — SODIUM PHOSPHATE,MONO-DIBASIC 19G-7G/118
1 ENEMA (ML) RECTAL 3 TIMES DAILY
Qty: 270 CAPSULE | Refills: 3 | Status: SHIPPED | OUTPATIENT
Start: 2017-11-22 | End: 2018-11-02

## 2017-11-22 RX ORDER — MIRTAZAPINE 15 MG/1
15 TABLET, FILM COATED ORAL AT BEDTIME
Qty: 90 TABLET | Refills: 3 | Status: SHIPPED | OUTPATIENT
Start: 2017-11-22 | End: 2018-10-03

## 2017-11-22 NOTE — TELEPHONE ENCOUNTER
Reason for Call:  Home Health Care    Estefanía with [facility] called regarding (reason for call): Medication  Needs the 3 Prescriptions called in (all info is under Meds & Orders)    Pt Provider:     Phone Number Homecare Nurse can be reached at: [phone]    Can we leave a detailed message on this number? YES          Call taken on 11/22/2017 at 11:29 AM by Singh Cunningham

## 2017-12-12 ENCOUNTER — TELEPHONE (OUTPATIENT)
Dept: FAMILY MEDICINE | Facility: CLINIC | Age: 82
End: 2017-12-12

## 2017-12-12 DIAGNOSIS — F41.9 ANXIETY: ICD-10-CM

## 2017-12-12 RX ORDER — QUETIAPINE FUMARATE 25 MG/1
TABLET, FILM COATED ORAL
Qty: 180 TABLET | Refills: 3 | Status: SHIPPED | OUTPATIENT
Start: 2017-12-12 | End: 2018-10-03

## 2017-12-12 NOTE — TELEPHONE ENCOUNTER
Reason for Call:  Medication or medication refill:    Do you use a [facility]?  Name of the pharmacy and phone number for the current request:      [facility] [address] [place], [place] - [address]      Name of the medication requested: QUEtiapine (SEROQUEL) 25 MG tablet    Other request: The pharmacy told her that they sent the request twice  The patient only has medication for tonight   Please place order ASAP so the patient can have medication for tomorrow     Can we leave a detailed message on this number? YES    Phone number Nurse can be reached at: [phone]    Best Time: anytime    Call taken on 12/12/2017 at 12:12 PM by Ana Higginbotham

## 2017-12-12 NOTE — TELEPHONE ENCOUNTER
Does not appear we have received the refill request from Pharmacy.      Seroquel      Last Written Prescription Date: 10/2017  Last Fill Quantity: 60,  # refills: 0   Last Office Visit with [facility] prescribing provider: 11/10/2017    Routing refill request to provider for review/approval because:  Medication is reported/historical: Last refilled by RUBENS CHRISTENSEN.    Please review refill.  Thank you.  Amanda Riddle, RN

## 2017-12-18 ENCOUNTER — TELEPHONE (OUTPATIENT)
Dept: FAMILY MEDICINE | Facility: CLINIC | Age: 82
End: 2017-12-18

## 2017-12-18 NOTE — TELEPHONE ENCOUNTER
Reason for Call:  Med questions     Detailed comments: pt refusing to take vitamin B-Complex   It has for sure been at least 2 weeks since she has taken   Pt also doesn't have glucosamine-chondroitin 500-400 MG CAPS per capsule at the house  HC Nurse isn't sure if pt should be taking     Spoke with Vargas  Nurse from [facility]   Ph. [phone] VM is okay       vargas has been working with pt for 2 weeks   She will be the patients  going forward       Call taken on 12/18/2017 at 2:49 PM by Nanda Minaya

## 2017-12-19 NOTE — TELEPHONE ENCOUNTER
I spoke with Melissa and let her know Dr Lopes thoughts.  She said patient will choose not to take it.  Shala Hamilton RN- Triage [employer]

## 2017-12-19 NOTE — TELEPHONE ENCOUNTER
OK for patient to determine whether she would like to take or hold B-complex vitamins and glucosamine chondroitin.  I do not have strong opinion as to the benefits, and there is very little harm from these medications.     Lazaro Gupta MD

## 2017-12-29 ENCOUNTER — TELEPHONE (OUTPATIENT)
Dept: FAMILY MEDICINE | Facility: CLINIC | Age: 82
End: 2017-12-29

## 2017-12-29 NOTE — TELEPHONE ENCOUNTER
Reason for Call:  Home Health Care    Socorro with [facility] called regarding (reason for call): Orders    Orders are needed for this patient. Skilled nursing    Skilled Nursin time for next week    Phone Number Homecare Nurse can be reached at:     Socorro from [facility]  ph. [phone]    Can we leave a detailed message on this number? YES    Best Time: Anytime    Call taken on 2017 at 9:41 AM by Tere Ruelas

## 2017-12-29 NOTE — TELEPHONE ENCOUNTER
Spoke with Socorro from [facility]. Verbal approval given per request below.Homecare/hospice agency to fax orders for provider signature.     Norma CONTRERAS RN

## 2018-01-01 ENCOUNTER — ASSISTED LIVING VISIT (OUTPATIENT)
Dept: GERIATRICS | Facility: CLINIC | Age: 83
End: 2018-01-01
Payer: MEDICARE

## 2018-01-01 ENCOUNTER — TELEPHONE (OUTPATIENT)
Dept: GERIATRICS | Facility: CLINIC | Age: 83
End: 2018-01-01

## 2018-01-01 VITALS — SYSTOLIC BLOOD PRESSURE: 128 MMHG | DIASTOLIC BLOOD PRESSURE: 82 MMHG

## 2018-01-01 VITALS — HEART RATE: 87 BPM | DIASTOLIC BLOOD PRESSURE: 74 MMHG | SYSTOLIC BLOOD PRESSURE: 138 MMHG | OXYGEN SATURATION: 92 %

## 2018-01-01 DIAGNOSIS — K21.00 GASTROESOPHAGEAL REFLUX DISEASE WITH ESOPHAGITIS: ICD-10-CM

## 2018-01-01 DIAGNOSIS — R11.11 VOMITING WITHOUT NAUSEA, INTRACTABILITY OF VOMITING NOT SPECIFIED, UNSPECIFIED VOMITING TYPE: Primary | ICD-10-CM

## 2018-01-01 DIAGNOSIS — F22 PARANOIA (H): ICD-10-CM

## 2018-01-01 DIAGNOSIS — I48.0 PAROXYSMAL ATRIAL FIBRILLATION (H): Primary | ICD-10-CM

## 2018-01-01 DIAGNOSIS — I63.40 CEREBRAL INFARCTION DUE TO EMBOLISM OF CEREBRAL ARTERY (H): ICD-10-CM

## 2018-01-01 DIAGNOSIS — R56.9 SEIZURE (H): ICD-10-CM

## 2018-01-01 DIAGNOSIS — R19.7 DIARRHEA, UNSPECIFIED TYPE: ICD-10-CM

## 2018-01-01 DIAGNOSIS — F41.9 ANXIETY: ICD-10-CM

## 2018-01-01 RX ORDER — MECOBALAMIN 5000 MCG
TABLET,DISINTEGRATING ORAL
Qty: 30 CAPSULE | Refills: 3 | Status: SHIPPED | OUTPATIENT
Start: 2018-01-01 | End: 2019-01-01

## 2018-01-10 ENCOUNTER — TELEPHONE (OUTPATIENT)
Dept: FAMILY MEDICINE | Facility: CLINIC | Age: 83
End: 2018-01-10

## 2018-01-10 NOTE — TELEPHONE ENCOUNTER
Socorro, RN case manager [facility], reporting purpura rash on both of pt's forearms.  Pt is a poor historian and not sure when it appeared..  Nurse not sure how long it's been there but she didn't notice it on pt a week ago.  Pt needs an appt to be seen.  Socorro [phone]  Pt ph# [phone]  If you speak with pt, please call Socorro as she is the  also.ok to leave a message.

## 2018-01-10 NOTE — TELEPHONE ENCOUNTER
Spoke to Patient.  She is a poor historian.  States she does have a rash on both arms but thinks its from Anxiety.  Denies itching feels it is getting better.  Declines appt with PCP.    Spoke to  Socorro.  She states rash looks worse than what patient states.  Was not present last week when she saw patient.  Raised red rash.  She was able to take a phone of rash.  Will have PCP review phone when received and advise.  Amanda Riddle RN

## 2018-01-12 NOTE — TELEPHONE ENCOUNTER
Left message for patient to return a call to the clinic.  Checking to see how rash is doing.  Dr. Gupta reviewed photos and thinks a reaction to something new such as new medication, etc?  Amanda Riddle RN

## 2018-01-12 NOTE — TELEPHONE ENCOUNTER
The patient said that the rash is disappearing because she is using the cream that Dr Gupta gave her   The patient said she does not need a call back today and that her HC Nurse will handle the rest

## 2018-03-20 DIAGNOSIS — Z53.9 DIAGNOSIS NOT YET DEFINED: Primary | ICD-10-CM

## 2018-03-20 PROCEDURE — 99207 C MD RECERTIFICATION HHA PT: CPT | Performed by: INTERNAL MEDICINE

## 2018-04-08 NOTE — TELEPHONE ENCOUNTER
HPI Comments: Patient is here with a fever on Tuesday and Wednesday, 4 and 5 days ago with no fever on Thursday but he vomited all day on Thursday. He felt better on Friday and Saturday which was yesterday however today he has a fever again. He also is telling his mother that he has a headache. His mom states they do hike a lot and were hiking on Monday, the day before this started. Patient did not get bitten or stung by anything that she is aware of. He has not had any chest pain, shortness of breath, abdominal pain, neck pain, dizziness, or swelling of his arms or legs, rash or other symptoms. Patient has not had immunizations as mom states \"his older brothers had some issues with them. \"    Patient is a 3 y.o. male presenting with fever. The history is provided by the mother and the patient. Pediatric Social History: This is a new problem. Episode onset: Tuesday, 5 days ago. The problem has not changed since onset. Chief complaint is no congestion, fever, no diarrhea, no sore throat, headache, vomiting, no ear pain and no swollen glands. Associated symptoms include a fever, vomiting and headaches. Pertinent negatives include no abdominal pain, no diarrhea, no congestion, no ear pain, no rhinorrhea, no sore throat and no swollen glands. He has been less active. He has been eating less than usual. There were no sick contacts. History reviewed. No pertinent past medical history. History reviewed. No pertinent surgical history. History reviewed. No pertinent family history. Social History     Social History    Marital status: SINGLE     Spouse name: N/A    Number of children: N/A    Years of education: N/A     Occupational History    Not on file.      Social History Main Topics    Smoking status: Never Smoker    Smokeless tobacco: Never Used    Alcohol use No    Drug use: No    Sexual activity: Not on file     Other Topics Concern    Not on file     Social I faxed these notes to Elisa at [facility].    Giovanni Peres, CMA    History Narrative         ALLERGIES: Eggshell membrane    Review of Systems   Constitutional: Positive for fever. HENT: Negative. Negative for congestion, ear pain, rhinorrhea and sore throat. Eyes: Negative. Respiratory: Negative. Cardiovascular: Negative. Gastrointestinal: Positive for vomiting. Negative for abdominal pain and diarrhea. Genitourinary: Negative. Musculoskeletal: Negative. Skin: Negative. Neurological: Positive for headaches. Psychiatric/Behavioral: Negative. All other systems reviewed and are negative. Vitals:    04/08/18 1415   Pulse: 144   Resp: 20   Temp: (!) 102.6 °F (39.2 °C)   SpO2: 98%   Weight: 15.7 kg            Physical Exam   Constitutional: He appears well-developed and well-nourished. HENT:   Head: Atraumatic. Right Ear: Tympanic membrane normal.   Left Ear: Tympanic membrane normal.   Nose: Nose normal.   Mouth/Throat: Mucous membranes are moist. Oropharynx is clear. Eyes: Conjunctivae and EOM are normal. Pupils are equal, round, and reactive to light. Neck: Normal range of motion. Neck supple. Cardiovascular: Normal rate and regular rhythm. Pulses are palpable. Pulmonary/Chest: Effort normal and breath sounds normal.   Abdominal: Full and soft. Bowel sounds are increased. There is no tenderness. Musculoskeletal: Normal range of motion. Patient able to run up and down [place] without difficulty, laughing and smiling. Neurological: He is alert. He has normal strength. No cranial nerve deficit or sensory deficit. He displays a negative Romberg sign. GCS eye subscore is 4. GCS verbal subscore is 5. GCS motor subscore is 6. Reflex Scores:       Tricep reflexes are 2+ on the right side and 2+ on the left side. Bicep reflexes are 2+ on the right side and 2+ on the left side. Brachioradialis reflexes are 2+ on the right side and 2+ on the left side. Patellar reflexes are 2+ on the right side and 2+ on the left side. Achilles reflexes are 2+ on the right side and 2+ on the left side. Skin: Skin is warm. Capillary refill takes less than 3 seconds. Nursing note and vitals reviewed. MDM  Number of Diagnoses or Management Options     Amount and/or Complexity of Data Reviewed  Clinical lab tests: ordered and reviewed    Risk of Complications, Morbidity, and/or Mortality  Presenting problems: moderate  Diagnostic procedures: moderate  Management options: moderate    Patient Progress  Patient progress: stable        ED Course       Procedures    3:30 PM Spoke with Dr. Carmen Rivera regarding patient, patient was evaluated by Dr. Carmen Rivera as well. She reviewed labs and urine as well. Patient looks good now, is hydrating without difficulty and is stable for discharge per Dr. Carmen Rivera. Mom was informed that if patient is worsening she should bring him back to the emergency room. This appears to be a viral syndrome. We will call her if the strep culture is positive. They are established at [address] and can follow up there for recheck. Patient should drink plenty of fluids, rest and return to the ED if worsening. Use Motrin or Tylenol as directed if needed for fever. The patient was observed in the ED. Results Reviewed:      Recent Results (from the past 24 hour(s))   STREP AG SCREEN, GROUP A    Collection Time: 04/08/18  2:24 PM   Result Value Ref Range    Group A Strep Ag ID NEGATIVE  NEG     INFLUENZA A & B AG (RAPID TEST)    Collection Time: 04/08/18  2:24 PM   Result Value Ref Range    Influenza A Ag NEGATIVE  NEG      Influenza B Ag NEGATIVE  NEG         I discussed the results of all labs, procedures, radiographs, and treatments with the patient and available family. Treatment plan is agreed upon and the patient is ready for discharge. All voiced understanding of the discharge plan and medication instructions or changes as appropriate. Questions about treatment in the ED were answered. All were encouraged to return should symptoms worsen or new problems develop.

## 2018-05-03 ENCOUNTER — TELEPHONE (OUTPATIENT)
Dept: FAMILY MEDICINE | Facility: CLINIC | Age: 83
End: 2018-05-03

## 2018-05-03 NOTE — TELEPHONE ENCOUNTER
Reason for Call:  Home Health Care    Norma with [facility]  Homecare called regarding (reason for call): orders    Orders are needed for this patient. Skilled nursing    PT:     OT:     Skilled Nursing: one time a week for nine weeks for medication set up    Pt Provider: Candy    Phone Number Homecare Nurse can be reached at: [phone]    Can we leave a detailed message on this number? YES        Call taken on 5/3/2018 at 10:14 AM by Cinthya Cosme

## 2018-05-08 DIAGNOSIS — Z53.9 DIAGNOSIS NOT YET DEFINED: Primary | ICD-10-CM

## 2018-05-08 PROCEDURE — 99207 C MD RECERTIFICATION HHA PT: CPT | Performed by: INTERNAL MEDICINE

## 2018-05-30 ENCOUNTER — TELEPHONE (OUTPATIENT)
Dept: FAMILY MEDICINE | Facility: CLINIC | Age: 83
End: 2018-05-30

## 2018-05-30 NOTE — TELEPHONE ENCOUNTER
Returned call to Sue at [facility].   Advised patient schedule OV with PCP, preferably when son available also.     Sue agreed with advise and will discuss with patient and her son later today.  Will have son call to schedule .     Claudia SANTOS RN,BSN

## 2018-05-30 NOTE — TELEPHONE ENCOUNTER
Noted, thank you. I would recommend a follow up office visit to review with son    Lazaro Gupta MD

## 2018-05-30 NOTE — TELEPHONE ENCOUNTER
Reason for Call:  Other FYI    Detailed comments: Sue with [facility] called, saw pt today and said pt is going through a stressful time, says she stated having thoughts of wanting to die. Pt PHQ9 is normal, only intermittent thoughts, no thoughts of self harm. Wanted to let Dr. Gupta know. Pts son is coming to try and get pt in assisted living facility.     Phone Number Patient can be reached at: Other phone number:  [phone]    Best Time: anytime     Can we leave a detailed message on this number? YES    Call taken on 5/30/2018 at 10:43 AM by Ave Arzola

## 2018-06-11 ENCOUNTER — OFFICE VISIT (OUTPATIENT)
Dept: FAMILY MEDICINE | Facility: CLINIC | Age: 83
End: 2018-06-11
Payer: COMMERCIAL

## 2018-06-11 VITALS
HEART RATE: 87 BPM | HEIGHT: 60 IN | DIASTOLIC BLOOD PRESSURE: 65 MMHG | OXYGEN SATURATION: 95 % | TEMPERATURE: 100 F | SYSTOLIC BLOOD PRESSURE: 132 MMHG

## 2018-06-11 DIAGNOSIS — F41.9 ANXIETY: ICD-10-CM

## 2018-06-11 DIAGNOSIS — F33.9 EPISODE OF RECURRENT MAJOR DEPRESSIVE DISORDER, UNSPECIFIED DEPRESSION EPISODE SEVERITY (H): Primary | ICD-10-CM

## 2018-06-11 DIAGNOSIS — R56.9 SEIZURE (H): ICD-10-CM

## 2018-06-11 PROCEDURE — 99213 OFFICE O/P EST LOW 20 MIN: CPT | Performed by: INTERNAL MEDICINE

## 2018-06-11 NOTE — PROGRESS NOTES
SUBJECTIVE:   Sintia Connors is a 82 year old female who presents to clinic today for the following health issues:      Follow up on depression    HPI:   Patient Sintia Connors is a very pleasant 82 year old female with history of chronic depression, anxiety who presents to Internal Medicine clinic today for follow up of her depression and anxiety symptoms. Regarding the patient's chronic depression, the patient has been taking Remeron 15 mg once a day at bedtime for antidepressant treatment of chronic depression. Patient strongly deny any suicidal ideations at this time. Her anxiety symptoms are also stable. Regarding the patient's chronic seizure disorder, the patient denies any recent breakthrough seizure episode. She has been compliant with her Keppra seizure prophylaxis medication. Patient denies any acute chest pain, headaches, fever or chills.      Current Medications:     Current Outpatient Prescriptions   Medication Sig Dispense Refill     acetaminophen (TYLENOL) 325 MG tablet Take 1-2 tablets (325-650 mg) by mouth every 6 hours as needed for mild pain Take 2 tabs (1000 mg) as needed three times daily       ASPIRIN PO Take 81 mg by mouth daily       calcium carbonate (TUMS) 500 MG chewable tablet Take 1 tablet (500 mg) by mouth every 6 hours as needed for heartburn 90 tablet      Cholecalciferol (VITAMIN D-3) 1000 UNITS CAPS Take 1 tablet by mouth daily 90 capsule 3     diclofenac (VOLTAREN) 1 % GEL APPLY 4 GRAMS TO KNEES OR 2 GRAMS TO HANDS FOUR TIMES DAILY USING ENCLOSED DOSING CARD. 100 g 3     esomeprazole (NEXIUM) 40 MG CR capsule Take 1 capsule (40 mg) by mouth every morning (before breakfast) Take 30-60 minutes before a eating. 30 capsule 11     flunisolide (NASALIDE) 25 MCG/ACT (0.025%) SOLN spray Spray 2 sprays into both nostrils 2 times daily 1 Bottle 3     glucosamine-chondroitin 500-400 MG CAPS per capsule Take 1 capsule by mouth 3 times daily 270 capsule 3     levETIRAcetam (KEPPRA) 750 MG  tablet Take 1 tablet (750 mg) by mouth 2 times daily 180 tablet 3     metoprolol (LOPRESSOR) 25 MG tablet TAKE 1 AND 1/2 TABLETS(37.5 MG) BY MOUTH TWICE DAILY 270 tablet 3     mirtazapine (REMERON) 15 MG tablet Take 1 tablet (15 mg) by mouth At Bedtime 90 tablet 3     multivitamin, therapeutic with minerals (MULTI-VITAMIN) TABS Take 1 tablet by mouth daily.       ORDER FOR DME Equipment being ordered: Shower Chair 1 each 0     ORDER FOR DME Equipment being ordered: Bath transfer bench  Fax to:[phone] 1 Device 0     ORDER FOR DME Equipment being ordered: transfer bath bench 1 Device 0     QUEtiapine (SEROQUEL) 25 MG tablet TAKE 1 TABLET(25 MG) BY MOUTH TWICE DAILY 180 tablet 3     vitamin B-Complex Take 1 tablet by mouth daily 90 tablet 3         Allergies:      Allergies   Allergen Reactions     Lorazepam Visual Disturbance     Severe hallucinations;            Past Medical History:     Past Medical History:   Diagnosis Date     Atrial flutter (H) 4/12/15    post op ANW     GERD (gastroesophageal reflux disease)      PVC (premature ventricular contraction)      Rheumatoid arthritis (H)      S/P lumpectomy of breast     benign      Seizures (H)      Senile osteoporosis          Past Surgical History:     Past Surgical History:   Procedure Laterality Date     APPENDECTOMY       Athroplasty right knee  4/2015     C TOTAL KNEE ARTHROPLASTY Left 09/2015     HERNIA REPAIR      hiatial     HERNIA REPAIR      inguial hernia repair     HYSTERECTOMY TOTAL ABDOMINAL, BILATERAL SALPINGO-OOPHORECTOMY, COMBINED           Family Medical History:     Family History   Problem Relation Age of Onset     HEART DISEASE No family hx of      Arrhythmia No family hx of          Social History:     Social History     Social History     Marital status: Single     Spouse name: N/A     Number of children: N/A     Years of education: N/A     Occupational History     Not on file.     Social History Main Topics     Smoking status: Former  Smoker     Quit date: 7/25/1983     Smokeless tobacco: Never Used     Alcohol use No     Drug use: No     Sexual activity: No     Other Topics Concern     Caffeine Concern No     coffee: 1/2 cup in the morning     Sleep Concern No     Stress Concern No     Weight Concern No     Special Diet No     Exercise Yes     stationary bike daily, walking around condo     Seat Belt Yes     Social History Narrative    No longer  - Living in a condo - looking to move to a more senior living    3 children - Chica living in [facility], Basil lives in [facility], [place] in [place] [employer]           Review of System:     Constitutional: Negative for fever or chills  Skin: Negative for rashes  Ears/Nose/Throat: Negative for nasal congestion, sore throat  Respiratory: No shortness of breath, dyspnea on exertion, cough, or hemoptysis  Cardiovascular: Negative for chest pain  Gastrointestinal: Negative for nausea, vomiting  Genitourinary: Negative for dysuria, hematuria  Musculoskeletal: Negative for myalgias  Neurologic: Negative for headaches. Negative for recent breakthrough seizure episode.  Psychiatric: Positive for depression, anxiety  Hematologic/Lymphatic/Immunologic: Negative  Endocrine: Negative  Behavioral: Negative for tobacco use       Physical Exam:   /65 (BP Location: Left arm, Patient Position: Chair, Cuff Size: Adult Large)  Pulse 87  Temp 100  F (37.8  C) (Tympanic)  Ht 5' (1.524 m)  SpO2 95%    GENERAL: alert and no distress  EYES: eyes grossly normal to inspection, and conjunctivae and sclerae normal  HENT: Normocephalic atraumatic. Nose and mouth without ulcers or lesions  NECK: supple  RESP: lungs clear to auscultation   CV: regular rate and rhythm, normal S1 S2  LYMPH: no peripheral edema   ABDOMEN: nondistended  MS: no gross musculoskeletal defects noted  SKIN: no suspicious lesions or rashes  NEURO: Alert & Oriented x 3.   PSYCH: mentation appears normal, affect normal. Patient denies any acute suicidal  ideations        Diagnostic Test Results:     Diagnostic Test Results:  Results for orders placed or performed in visit on 11/10/17   CBC with platelets   Result Value Ref Range    WBC 11.4 (H) 4.0 - 11.0 10e9/L    RBC Count 5.15 3.8 - 5.2 10e12/L    Hemoglobin 14.7 11.7 - 15.7 g/dL    Hematocrit 46.6 35.0 - 47.0 %    MCV 91 78 - 100 fl    MCH 28.5 26.5 - 33.0 pg    MCHC 31.5 31.5 - 36.5 g/dL    RDW 14.8 10.0 - 15.0 %    Platelet Count 241 150 - 450 10e9/L   Lipid panel reflex to direct LDL Fasting   Result Value Ref Range    Cholesterol 196 <200 mg/dL    Triglycerides 135 <150 mg/dL    HDL Cholesterol 44 (L) >49 mg/dL    LDL Cholesterol Calculated 125 (H) <100 mg/dL    Non HDL Cholesterol 152 (H) <130 mg/dL       PHQ-9 score:    PHQ-9 SCORE 5/12/2015   Total Score 0         ASSESSMENT/PLAN:       (F33.9) Episode of recurrent major depressive disorder, unspecified depression episode severity (H)  (primary encounter diagnosis)  (F41.9) Anxiety  Comment: patient's chronic depression and anxiety symptoms are stable. Patient has no acute suicidal ideations. Patient declined to have her Remeron antidepressant medication dose increased at this time.  Plan: continue current Remeron depression medication going forward at the current dose of 15 mg once daily at bedtime.      (R56.9) Seizure (H)  Comment: no recent seizure breakthrough episodes. Patient is compliant with her Keppra seizure prophylaxis medication.  Plan: Continue current Keppra seizure prophylaxis medication going forward.        Follow Up Plan:     Patient is instructed to return to Internal Medicine clinic for follow-up visit in 1 month.        Gretchen Morris MD  Internal Medicine  [facility]

## 2018-06-11 NOTE — MR AVS SNAPSHOT
After Visit Summary   6/11/2018    Sintia Connors    MRN: [id number]           Patient Information     Date Of Birth          1935        Visit Information        Provider Department      6/11/2018 1:40 PM Gretchen Morris MD [facility]        Today's Diagnoses     Episode of recurrent major depressive disorder, unspecified depression episode severity (H)    -  1    Seizure (H)        Anxiety           Follow-ups after your visit        Who to contact     If you have questions or need follow up information about today's clinic visit or your schedule please contact [facility] directly at [phone].  Normal or non-critical lab and imaging results will be communicated to you by [employer], letter or phone within 4 business days after the clinic has received the results. If you do not hear from us within 7 days, please contact the clinic through [employer] or phone. If you have a critical or abnormal lab result, we will notify you by phone as soon as possible.  Submit refill requests through [facility] or call your pharmacy and they will forward the refill request to us. Please allow 3 business days for your refill to be completed.          Additional Information About Your Visit        MyChart Information     [facility] gives you secure access to your electronic health record. If you see a primary care provider, you can also send messages to your care team and make appointments. If you have questions, please call your primary care clinic.  If you do not have a primary care provider, please call [phone] and they will assist you.        Care EveryWhere ID     This is your Care EveryWhere ID. This could be used by other organizations to access your [facility] medical records  [id number]        Your Vitals Were     Pulse Temperature Height Pulse Oximetry          87 100  F (37.8  C) (Tympanic) 5' (1.524 m) 95%         Blood Pressure from Last 3 Encounters:   06/11/18 132/65    11/10/17 117/60   08/01/17 146/77    Weight from Last 3 Encounters:   11/10/17 164 lb (74.4 kg)   08/01/17 164 lb (74.4 kg)   06/20/17 164 lb (74.4 kg)              Today, you had the following     No orders found for display       Primary Care Provider Office Phone # Fax #    Lazaro Gupta [phone] [address]  [facility] [address]        Equal Access to Services     [facility] : Hadii aad ku hadasho Soomaali, waaxda luqadaha, qaybta kaalmada adeegyada, waxelvia castillo hayvictor manuel villarreal . So [facility] [phone].    ATENCIÓN: Si habla español, tiene a agarwal disposición servicios gratuitos de asistencia lingüística. [facility] [phone].    We comply with applicable federal civil rights laws and [place] laws. We do not discriminate on the basis of race, color, national origin, age, disability, sex, sexual orientation, or gender identity.            Thank you!     Thank you for choosing [facility]  for your care. Our goal is always to provide you with excellent care. Hearing back from our patients is one way we can continue to improve our services. Please take a few minutes to complete the written survey that you may receive in the mail after your visit with us. Thank you!             Your Updated Medication List - Protect others around you: Learn how to safely use, store and throw away your medicines at www.disposemymeds.org.          This list is accurate as of 6/11/18 11:59 PM.  Always use your most recent med list.                   Brand Name Dispense Instructions for use Diagnosis    acetaminophen 325 MG tablet    TYLENOL     Take 1-2 tablets (325-650 mg) by mouth every 6 hours as needed for mild pain Take 2 tabs (1000 mg) as needed three times daily    Primary osteoarthritis of right knee       ASPIRIN PO      Take 81 mg by mouth daily        calcium carbonate 500 MG chewable tablet    TUMS    90 tablet    Take 1 tablet (500 mg) by mouth every 6  hours as needed for heartburn        diclofenac 1 % Gel topical gel    VOLTAREN    100 g    APPLY 4 GRAMS TO KNEES OR 2 GRAMS TO HANDS FOUR TIMES DAILY USING ENCLOSED DOSING CARD.    Other secondary osteoarthritis of knee       esomeprazole 40 MG CR capsule    nexIUM    30 capsule    Take 1 capsule (40 mg) by mouth every morning (before breakfast) Take 30-60 minutes before a eating.    Gastroesophageal reflux disease with esophagitis, Abdominal pain, epigastric       flunisolide 25 MCG/ACT (0.025%) Soln spray    NASALIDE    1 Bottle    Spray 2 sprays into both nostrils 2 times daily    Nasal congestion       glucosamine-chondroitin 500-400 MG Caps per capsule     270 capsule    Take 1 capsule by mouth 3 times daily    Primary osteoarthritis involving multiple joints       levETIRAcetam 750 MG tablet    KEPPRA    180 tablet    Take 1 tablet (750 mg) by mouth 2 times daily    Seizure (H)       metoprolol tartrate 25 MG tablet    LOPRESSOR    270 tablet    TAKE 1 AND 1/2 TABLETS(37.5 MG) BY MOUTH TWICE DAILY    Paroxysmal atrial fibrillation (H)       mirtazapine 15 MG tablet    REMERON    90 tablet    Take 1 tablet (15 mg) by mouth At Bedtime    Anxiety       Multi-vitamin Tabs tablet      Take 1 tablet by mouth daily.        order for DME     1 Device    Equipment being ordered: transfer bath bench    Osteoarthritis       order for DME     1 Device    Equipment being ordered: Bath transfer bench Fax to:[phone]    Osteoarthritis       order for DME     1 each    Equipment being ordered: Shower Chair    Osteoarthritis of knee       QUEtiapine 25 MG tablet    SEROquel    180 tablet    TAKE 1 TABLET(25 MG) BY MOUTH TWICE DAILY    Anxiety       vitamin B-Complex     90 tablet    Take 1 tablet by mouth daily    Vitamin B12 deficiency without anemia       Vitamin D-3 1000 units Caps     90 capsule    Take 1 tablet by mouth daily    Vitamin D deficiency

## 2018-06-11 NOTE — NURSING NOTE
Chief Complaint   Patient presents with     Medication Check        Initial /65 (BP Location: Left arm, Patient Position: Chair, Cuff Size: Adult Large)  Pulse 87  Temp 100  F (37.8  C) (Tympanic)  Ht 5' (1.524 m)  SpO2 95% Estimated body mass index is 32.03 kg/(m^2) as calculated from the following:    Height as of 11/10/17: 5' (1.524 m).    Weight as of 11/10/17: 164 lb (74.4 kg)..    BP completed using cuff size: large  MEDICATIONS REVIEWED  SOCIAL AND FAMILY HX REVIEWED  Katelyn Oakley CMA

## 2018-07-11 DIAGNOSIS — Z53.9 DIAGNOSIS NOT YET DEFINED: Primary | ICD-10-CM

## 2018-07-11 PROCEDURE — G0179 MD RECERTIFICATION HHA PT: HCPCS | Performed by: INTERNAL MEDICINE

## 2018-07-26 DIAGNOSIS — I48.0 PAROXYSMAL ATRIAL FIBRILLATION (H): ICD-10-CM

## 2018-07-26 NOTE — TELEPHONE ENCOUNTER
"  metoprolol (LOPRESSOR) 25 MG tablet 270 tablet 3 8/21/2017       Last Written Prescription Date:  08/21/2017  Last Fill Quantity: 270,  # refills: 3   Last office visit: 6/11/2018 with prescribing provider:    Future Office Visit: Unknown      Requested Prescriptions   Pending Prescriptions Disp Refills     metoprolol tartrate (LOPRESSOR) 25 MG tablet [Pharmacy Med Name: METOPROLOL TARTRATE 25MG TABLETS] 270 tablet 0     Sig: TAKE 1 AND 1/2 TABLETS(37.5 MG) BY MOUTH TWICE DAILY    Beta-Blockers Protocol Passed    7/26/2018 11:46 AM       Passed - Blood pressure under 140/90 in past 12 months    BP Readings from Last 3 Encounters:   06/11/18 132/65   11/10/17 117/60   08/01/17 146/77                Passed - Patient is age 6 or older       Passed - Recent (12 mo) or future (30 days) visit within the authorizing provider's specialty    Patient had office visit in the last 12 months or has a visit in the next 30 days with authorizing provider or within the authorizing provider's specialty.  See \"Patient Info\" tab in inbasket, or \"Choose Columns\" in Meds & Orders section of the refill encounter.              "

## 2018-07-27 RX ORDER — METOPROLOL TARTRATE 25 MG/1
TABLET, FILM COATED ORAL
Qty: 270 TABLET | Refills: 3 | Status: SHIPPED | OUTPATIENT
Start: 2018-07-27 | End: 2018-11-02

## 2018-07-29 ENCOUNTER — HOSPITAL ENCOUNTER (EMERGENCY)
Facility: CLINIC | Age: 83
Discharge: HOME | End: 2018-07-29
Attending: EMERGENCY MEDICINE | Admitting: EMERGENCY MEDICINE
Payer: MEDICARE

## 2018-07-29 VITALS
SYSTOLIC BLOOD PRESSURE: 109 MMHG | TEMPERATURE: 98.8 F | RESPIRATION RATE: 20 BRPM | HEART RATE: 79 BPM | HEIGHT: 62 IN | OXYGEN SATURATION: 95 % | DIASTOLIC BLOOD PRESSURE: 58 MMHG

## 2018-07-29 DIAGNOSIS — Z86.59 HISTORY OF DEPRESSION: ICD-10-CM

## 2018-07-29 DIAGNOSIS — F43.20 ADJUSTMENT DISORDER, UNSPECIFIED TYPE: ICD-10-CM

## 2018-07-29 DIAGNOSIS — R45.851 SUICIDAL IDEATION: ICD-10-CM

## 2018-07-29 LAB
ALBUMIN SERPL-MCNC: 3.8 G/DL (ref 3.4–5)
ALP SERPL-CCNC: 65 U/L (ref 40–150)
ALT SERPL W P-5'-P-CCNC: 26 U/L (ref 0–50)
ANION GAP SERPL CALCULATED.3IONS-SCNC: 8 MMOL/L (ref 3–14)
AST SERPL W P-5'-P-CCNC: 22 U/L (ref 0–45)
BASOPHILS # BLD AUTO: 0 10E9/L (ref 0–0.2)
BASOPHILS NFR BLD AUTO: 0.3 %
BILIRUB SERPL-MCNC: 0.5 MG/DL (ref 0.2–1.3)
BUN SERPL-MCNC: 15 MG/DL (ref 7–30)
CALCIUM SERPL-MCNC: 8.8 MG/DL (ref 8.5–10.1)
CHLORIDE SERPL-SCNC: 110 MMOL/L (ref 94–109)
CO2 SERPL-SCNC: 26 MMOL/L (ref 20–32)
CREAT SERPL-MCNC: 0.74 MG/DL (ref 0.52–1.04)
DIFFERENTIAL METHOD BLD: NORMAL
EOSINOPHIL # BLD AUTO: 0.2 10E9/L (ref 0–0.7)
EOSINOPHIL NFR BLD AUTO: 3.1 %
ERYTHROCYTE [DISTWIDTH] IN BLOOD BY AUTOMATED COUNT: 13.8 % (ref 10–15)
ETHANOL SERPL-MCNC: <0.01 G/DL
GFR SERPL CREATININE-BSD FRML MDRD: 76 ML/MIN/1.7M2
GLUCOSE SERPL-MCNC: 105 MG/DL (ref 70–99)
HCT VFR BLD AUTO: 43.8 % (ref 35–47)
HGB BLD-MCNC: 14 G/DL (ref 11.7–15.7)
IMM GRANULOCYTES # BLD: 0 10E9/L (ref 0–0.4)
IMM GRANULOCYTES NFR BLD: 0.3 %
LYMPHOCYTES # BLD AUTO: 1.7 10E9/L (ref 0.8–5.3)
LYMPHOCYTES NFR BLD AUTO: 26.3 %
MCH RBC QN AUTO: 28.9 PG (ref 26.5–33)
MCHC RBC AUTO-ENTMCNC: 32 G/DL (ref 31.5–36.5)
MCV RBC AUTO: 90 FL (ref 78–100)
MONOCYTES # BLD AUTO: 0.6 10E9/L (ref 0–1.3)
MONOCYTES NFR BLD AUTO: 9.8 %
NEUTROPHILS # BLD AUTO: 4 10E9/L (ref 1.6–8.3)
NEUTROPHILS NFR BLD AUTO: 60.2 %
NRBC # BLD AUTO: 0 10*3/UL
NRBC BLD AUTO-RTO: 0 /100
PLATELET # BLD AUTO: 194 10E9/L (ref 150–450)
POTASSIUM SERPL-SCNC: 4.1 MMOL/L (ref 3.4–5.3)
PROT SERPL-MCNC: 7.3 G/DL (ref 6.8–8.8)
RBC # BLD AUTO: 4.85 10E12/L (ref 3.8–5.2)
SODIUM SERPL-SCNC: 144 MMOL/L (ref 133–144)
WBC # BLD AUTO: 6.6 10E9/L (ref 4–11)

## 2018-07-29 PROCEDURE — 85025 COMPLETE CBC W/AUTO DIFF WBC: CPT | Performed by: EMERGENCY MEDICINE

## 2018-07-29 PROCEDURE — A9270 NON-COVERED ITEM OR SERVICE: HCPCS | Mod: GY | Performed by: EMERGENCY MEDICINE

## 2018-07-29 PROCEDURE — 99285 EMERGENCY DEPT VISIT HI MDM: CPT | Mod: 25

## 2018-07-29 PROCEDURE — 80053 COMPREHEN METABOLIC PANEL: CPT | Performed by: EMERGENCY MEDICINE

## 2018-07-29 PROCEDURE — 25000132 ZZH RX MED GY IP 250 OP 250 PS 637: Mod: GY | Performed by: EMERGENCY MEDICINE

## 2018-07-29 PROCEDURE — 80320 DRUG SCREEN QUANTALCOHOLS: CPT | Performed by: EMERGENCY MEDICINE

## 2018-07-29 PROCEDURE — 90791 PSYCH DIAGNOSTIC EVALUATION: CPT

## 2018-07-29 PROCEDURE — 80177 DRUG SCRN QUAN LEVETIRACETAM: CPT | Performed by: EMERGENCY MEDICINE

## 2018-07-29 RX ORDER — QUETIAPINE FUMARATE 25 MG/1
25 TABLET, FILM COATED ORAL ONCE
Status: COMPLETED | OUTPATIENT
Start: 2018-07-29 | End: 2018-07-29

## 2018-07-29 RX ADMIN — QUETIAPINE FUMARATE 25 MG: 25 TABLET, FILM COATED ORAL at 15:22

## 2018-07-29 NOTE — ED NOTES
Pt was unable to void on bedside commode. Wheeled pt to restroom. She had a mixed BM-urination; therefore, no urine specimen was obtained.

## 2018-07-29 NOTE — ED NOTES
Bed: ED17  Expected date: 7/29/18  Expected time: 10:44 AM  Means of arrival: Ambulance  Comments:  Jaelyn 83F jessica KHAN  ETA 1043

## 2018-07-29 NOTE — ED NOTES
Patient ambulating around, telling everybody she loves them and that she's not having a stroke and going to live. 1:1 sitter at patient side.

## 2018-07-29 NOTE — ED NOTES
IV inserted on first attempt. Blood specimens obtained and sent to the lab. Attempted to straight catheterize pt (under direct observation of WENDY Erwin); however, she became extremely (verbally and physically) combative and aggressive. Also attempted to pull overhead exam light from ceiling. Despite reorienting and re-educating pt as to why we were attempting a catheterized specimen, she was agitated with staff. Dr. Roach made aware.

## 2018-07-29 NOTE — DISCHARGE INSTRUCTIONS
Discharge Instructions  Mental Health Concerns    You were seen today for mental health concerns, such as depression, anxiety, or suicidal thinking. Your provider feels that you do not require hospitalization at this time. However, your symptoms may become worse, and you may need to return to the Emergency Department. Most treatments of depression and suicidal thoughts are a process rather than a single intervention.  Medications and counseling can take several weeks or more to help.    Generally, every Emergency Department visit should have a follow-up clinic visit with either a primary or a specialty clinic/provider. Please follow-up as instructed by your emergency provider today.    By accepting these discharge instructions:    You promise to not harm yourself or others.    You agree that if you feel you are becoming unable to keep that promise, you will do something to help yourself before you do anything to harm yourself or others.     You agree to keep any safety plan arranged on your visit here today.    You agree to take any medication prescribed or recommended by your provider.    If you are getting worse, you can contact a friend or a family member, contact your counselor or family provider, contact a crisis line, or other options discussed with the provider or therapist today.    At any time, you can call 911 and return to the Emergency Department for more help.    You understand that follow-up is essential to your treatment, and you will make and keep appointments recommended on your visit today.    How to improve your mental health and prevent suicide:    Involve others by letting family, friends, counselors know.  Do not isolate yourself.    Avoid alcohol or drugs. Remove weapons, poisons from your home.    Try to stick to routines for eating, sleeping and getting regular exercise.      Try to get into sunlight. Bright natural light not only treats seasonal affective disorder but also  depression.    Increase safe activities that you enjoy.    If you feel worse, contact [phone] ([phone]), or call 911, or your primary provider/counselor for additional assistance.    If you were given a prescription for medicine here today, be sure to read all of the information (including the package insert) that comes with your prescription.  This will include important information about the medicine, its side effects, and any warnings that you need to know about.  The pharmacist who fills the prescription can provide more information and answer questions you may have about the medicine.  If you have questions or concerns that the pharmacist cannot address, please call or return to the Emergency Department.   Remember that you can always come back to the Emergency Department if you are not able to see your regular provider in the amount of time listed above, if you get any new symptoms, or if there is anything that worries you.

## 2018-07-29 NOTE — ED NOTES
Pt having increased agitation due to not having her sleep shirt/pants on. Pt given these items and PIV removed. We will be planning for discharge once her son(s) get here.

## 2018-07-29 NOTE — ED PROVIDER NOTES
"  History     Chief Complaint:  Suicidal    HPI   Sintia Connors is a 83 year old female who presents by EMS for mental health evaluation.  [facility] but her sons were there today to help her move to a different facility.  Sons became concerned when she was verbally and physically abusive to them so they called 911.  The patient also reportedly made suicidal threats in the form of asking the police to just \"shoot and kill\" her, so she was placed on a hold and brought to this emergency department.  Patient denies having any acute pain.  Her sons have concerned that she may not be taking her medications.     Review of her electronic records show a history of anxiety depression seizure and ischemic stroke, and her medication list includes Keppra 750 mg twice daily, Seroquel 25 mill grams twice daily, and Remeron 15 mg in the evening.    At 1125, I received a return phone call from the patient's son Pérez, whose phone number is [phone].  He lives in [place] but has been here in [place] since Thursday to help his mother move.  He states the patient has had mental health troubles for about 5 years, which became worse in early 2018 once talk of moving her into an assisted living facility began in earnest.  He states that starting yesterday she became very stressed and was verbally abusive to the patient's sons who are visiting from out of state to help with this move.  He states that she will become very angry and then soon thereafter ask for forgiveness, and stones told her that they did not appreciate her behavior which then caused her to escalate.  Pérez states that a weekly nurse visits to help set up her meds, though he speculates that she is not taking all of them and probably just taking the Keppra as prescribed.  He states that she acts like this \"quite a bit\" and has had occasional suicidal thoughts for multiple years but never acted on them.    Allergies:  Lorazepam, visual " "disturbance     Medications:    Tylenol  Voltaren  Nexium  Nasalide  Keppra  Lopressor  Remeron  Seroquel    Past Medical History:    Atrial flutter  Cerebral infarction  GERD  PVC  Rheumatoid arthritis  Seizures  Senile osteoporosis  Insomnia  Anxiety  Depression    Past Surgical History:    History reviewed. No pertinent surgical history.    Family History:    History reviewed. No pertinent family history.     Social History:  Smoking status: Former Smoker, Quit Date: 7/25/1983  Alcohol use: No  Lives in a condo    Review of Systems   Unable to perform ROS: Other   patient is a poor and unreliable historian    Physical Exam   Patient Vitals for the past 24 hrs:   BP Temp Temp src Pulse SpO2 Height   07/29/18 1055 132/63 98.8  F (37.1  C) Oral 79 93 % 1.575 m (5' 2\")       Physical Exam  General: Nontoxic-appearing elderly woman sitting upright in room 17  HENT: mucous membranes moist, OP clear, face nontender  Eyes: PERRL without nystagmus  CV: extremities well perfused, regular rate, no murmur audible, no LE edema  Resp:  normal effort, clear throughout  GI: abdomen soft and nontender, no guarding  MSK: no bony tenderness, no CVAT  Skin: appropriately warm and dry  Neuro: alert, clear speech, when asked the day and month she states \"why should I care\", normal tone in extremities, ambulatory, no meningismus  Psych: Seems irritated by questioning and basic nursing cares though does cooperate with him, admits to asking the  to kill her today though then in the same breath states that she would never want to hurt herself, no evidence of hallucinations      Emergency Department Course   Laboratory:  [facility] Level (In process):    CBC: WNL (WBC 6.6, HGB 14.0, )  CMP: Chloride 110 (H), Glucose 105 (H), WNL (Creatinine 0.74)    Alcohol Ethyl: <0.01      Emergency Department Course:  Past medical records, nursing notes, and vitals reviewed.  1108: I performed an exam of the patient and obtained history, as " documented above.    IV inserted and blood drawn.    At 1119, I left a message with her son Pérez on his phone.  He called back 6 minutes later and I spoke with him for some time regarding the details of her recent history.    Pt was evaluated by DEC.  TJ spoke with the patient's son and evaluated the patient as well.    1354: I rechecked the patient. Explained findings to patient.    1533: I rechecked the patient. Explained findings to patient.Spoke with ED MH RN.    Patient will be discharged when her son is finished with the moving arrangements, tentatively expected sometime between 3 and 8 PM today.    Dr. Red aware of patient as of 1615, should there be any unexpected acute events.    Impression & Plan    Medical Decision Making:  She presents after making some suicidal comments, though after getting further history it seems that this is not particularly unusual for her.  She admits to making these comments but denies any desire to act on them.  She was seen by DEC.  Medical precipitants including metabolic abnormality, thyroid derangements, intracranial processes were considered but are neither detected nor suspected.  She has been cooperative here.  Neither the patient nor her family feels that she needs to be admitted, so she will be simply monitored closely in the emergency department in the coming hours until her family can come to pick her up, as they are currently occupied moving her belongings into a new assisted living unit.  None of the involved parties feel that hospitalization would give her any clinical benefit, though clearly she should return here for acute worsening at any time and follow-up through clinic in the near future.      Diagnosis:    ICD-10-CM    1. Suicidal ideation R45.851    2. History of depression Z86.59    3. Adjustment disorder, unspecified type F43.20        Disposition:  Anticipate discharge to her assisted living facility with son, later this afternoon; signed out to   "Joing      This record was created at least in part using electronic voice recognition software, so please excuse any \"typos.\"      Guzman Noland  7/29/2018    EMERGENCY DEPARTMENT  Scribe Disclosure:  I, Guzman Noland, am serving as a scribe at 11:08 AM on 7/29/2018 to document services personally performed by Larry Roach MD based on my observations and the provider's statements to me.        Larry Roach MD  07/29/18 6854    "

## 2018-07-29 NOTE — ED AVS SNAPSHOT
Emergency Department    [address] [facility] [address]    Phone:  [phone]    Fax:  [phone]                                       Sintia Connors   MRN: [id number]    Department:   Emergency Department   Date of Visit:  7/29/2018           Patient Information     Date Of Birth          1935        Your diagnoses for this visit were:     Suicidal ideation     History of depression     Adjustment disorder, unspecified type        You were seen by Larry Roach MD and Cristhian Red MD.      Follow-up Information     Follow up with Lazaro Gupta MD. Call in 1 day.    Specialty:  Internal Medicine    Contact information:    [address] [id number] [phone]          Schedule an appointment as soon as possible for a visit with Mental Health clinic.        Follow up with  Emergency Department.    Specialty:  EMERGENCY MEDICINE    Why:  As needed for crisis    Contact information:    [address] [facility] [address] [phone]        Discharge Instructions       Discharge Instructions  Mental Health Concerns    You were seen today for mental health concerns, such as depression, anxiety, or suicidal thinking. Your provider feels that you do not require hospitalization at this time. However, your symptoms may become worse, and you may need to return to the Emergency Department. Most treatments of depression and suicidal thoughts are a process rather than a single intervention.  Medications and counseling can take several weeks or more to help.    Generally, every Emergency Department visit should have a follow-up clinic visit with either a primary or a specialty clinic/provider. Please follow-up as instructed by your emergency provider today.    By accepting these discharge instructions:    You promise to not harm yourself or others.    You agree that if you feel you are becoming unable to keep that promise, you will  do something to help yourself before you do anything to harm yourself or others.     You agree to keep any safety plan arranged on your visit here today.    You agree to take any medication prescribed or recommended by your provider.    If you are getting worse, you can contact a friend or a family member, contact your counselor or family provider, contact a crisis line, or other options discussed with the provider or therapist today.    At any time, you can call 911 and return to the Emergency Department for more help.    You understand that follow-up is essential to your treatment, and you will make and keep appointments recommended on your visit today.    How to improve your mental health and prevent suicide:    Involve others by letting family, friends, counselors know.  Do not isolate yourself.    Avoid alcohol or drugs. Remove weapons, poisons from your home.    Try to stick to routines for eating, sleeping and getting regular exercise.      Try to get into sunlight. Bright natural light not only treats seasonal affective disorder but also depression.    Increase safe activities that you enjoy.    If you feel worse, contact [phone] ([phone]), or call 911, or your primary provider/counselor for additional assistance.    If you were given a prescription for medicine here today, be sure to read all of the information (including the package insert) that comes with your prescription.  This will include important information about the medicine, its side effects, and any warnings that you need to know about.  The pharmacist who fills the prescription can provide more information and answer questions you may have about the medicine.  If you have questions or concerns that the pharmacist cannot address, please call or return to the Emergency Department.   Remember that you can always come back to the Emergency Department if you are not able to see your regular provider in the amount of time listed above,  if you get any new symptoms, or if there is anything that worries you.      24 Hour Appointment Hotline       To make an appointment at any [facility], call [phone]). If you don't have a family doctor or clinic, we will help you find one. [facility] are conveniently located to serve the needs of you and your family.             Review of your medicines      Our records show that you are taking the medicines listed below. If these are incorrect, please call your family doctor or clinic.        Dose / Directions Last dose taken    acetaminophen 325 MG tablet   Commonly known as:  TYLENOL   Dose:  325-650 mg        Take 1-2 tablets (325-650 mg) by mouth every 6 hours as needed for mild pain Take 2 tabs (1000 mg) as needed three times daily   Refills:  0        ASPIRIN PO   Dose:  81 mg        Take 81 mg by mouth daily   Refills:  0        calcium carbonate 500 MG chewable tablet   Commonly known as:  TUMS   Dose:  1 chew tab   Quantity:  90 tablet        Take 1 tablet (500 mg) by mouth every 6 hours as needed for heartburn   Refills:  0        diclofenac 1 % Gel topical gel   Commonly known as:  VOLTAREN   Quantity:  100 g        APPLY 4 GRAMS TO KNEES OR 2 GRAMS TO HANDS FOUR TIMES DAILY USING ENCLOSED DOSING CARD.   Refills:  3        esomeprazole 40 MG CR capsule   Commonly known as:  nexIUM   Dose:  40 mg   Quantity:  30 capsule        Take 1 capsule (40 mg) by mouth every morning (before breakfast) Take 30-60 minutes before a eating.   Refills:  11        flunisolide 25 MCG/ACT (0.025%) Soln spray   Commonly known as:  NASALIDE   Dose:  2 spray   Quantity:  1 Bottle        Spray 2 sprays into both nostrils 2 times daily   Refills:  3        glucosamine-chondroitin 500-400 MG Caps per capsule   Dose:  1 capsule   Quantity:  270 capsule        Take 1 capsule by mouth 3 times daily   Refills:  3        levETIRAcetam 750 MG tablet   Commonly known as:  KEPPRA   Dose:  750 mg    Quantity:  180 tablet        Take 1 tablet (750 mg) by mouth 2 times daily   Refills:  3        metoprolol tartrate 25 MG tablet   Commonly known as:  LOPRESSOR   Quantity:  270 tablet        TAKE 1 AND 1/2 TABLETS(37.5 MG) BY MOUTH TWICE DAILY   Refills:  3        mirtazapine 15 MG tablet   Commonly known as:  REMERON   Dose:  15 mg   Quantity:  90 tablet        Take 1 tablet (15 mg) by mouth At Bedtime   Refills:  3        Multi-vitamin Tabs tablet   Dose:  1 tablet        Take 1 tablet by mouth daily.   Refills:  0        order for DME   Quantity:  1 Device        Equipment being ordered: transfer bath bench   Refills:  0        order for DME   Quantity:  1 Device        Equipment being ordered: Bath transfer bench Fax to:[phone]   Refills:  0        order for DME   Quantity:  1 each        Equipment being ordered: Shower Chair   Refills:  0        QUEtiapine 25 MG tablet   Commonly known as:  SEROquel   Quantity:  180 tablet        TAKE 1 TABLET(25 MG) BY MOUTH TWICE DAILY   Refills:  3        vitamin B-Complex   Dose:  1 tablet   Quantity:  90 tablet        Take 1 tablet by mouth daily   Refills:  3        Vitamin D-3 1000 units Caps   Dose:  1 tablet   Quantity:  90 capsule        Take 1 tablet by mouth daily   Refills:  3                Procedures and tests performed during your visit     Alcohol ethyl    CBC with platelets differential    Comprehensive metabolic panel    Keppra (Levetiracetam) Level    Peripheral IV catheter      Orders Needing Specimen Collection     None      Pending Results     Date and Time Order Name Status Description    7/29/2018 1118 Keppra (Levetiracetam) Level In process             Pending Culture Results     No orders found from 7/27/2018 to 7/30/2018.            Pending Results Instructions     If you had any lab results that were not finalized at the time of your Discharge, you can call the ED Lab Result RN at [phone]. You will be contacted by this team for any  positive Lab results or changes in treatment. The nurses are available 7 days a week from 10A to 6:30P.  You can leave a message 24 hours per day and they will return your call.        Test Results From Your Hospital Stay        7/29/2018 12:10 PM      Component Results     Component Value Ref Range & Units Status    WBC 6.6 4.0 - 11.0 10e9/L Final    RBC Count 4.85 3.8 - 5.2 10e12/L Final    Hemoglobin 14.0 11.7 - 15.7 g/dL Final    Hematocrit 43.8 35.0 - 47.0 % Final    MCV 90 78 - 100 fl Final    MCH 28.9 26.5 - 33.0 pg Final    MCHC 32.0 31.5 - 36.5 g/dL Final    RDW 13.8 10.0 - 15.0 % Final    Platelet Count 194 150 - 450 10e9/L Final    Diff Method Automated Method  Final    % Neutrophils 60.2 % Final    % Lymphocytes 26.3 % Final    % Monocytes 9.8 % Final    % Eosinophils 3.1 % Final    % Basophils 0.3 % Final    % Immature Granulocytes 0.3 % Final    Nucleated RBCs 0 0 /100 Final    Absolute Neutrophil 4.0 1.6 - 8.3 10e9/L Final    Absolute Lymphocytes 1.7 0.8 - 5.3 10e9/L Final    Absolute Monocytes 0.6 0.0 - 1.3 10e9/L Final    Absolute Eosinophils 0.2 0.0 - 0.7 10e9/L Final    Absolute Basophils 0.0 0.0 - 0.2 10e9/L Final    Abs Immature Granulocytes 0.0 0 - 0.4 10e9/L Final    Absolute Nucleated RBC 0.0  Final         7/29/2018 12:31 PM      Component Results     Component Value Ref Range & Units Status    Sodium 144 133 - 144 mmol/L Final    Potassium 4.1 3.4 - 5.3 mmol/L Final    Chloride 110 (H) 94 - 109 mmol/L Final    Carbon Dioxide 26 20 - 32 mmol/L Final    Anion Gap 8 3 - 14 mmol/L Final    Glucose 105 (H) 70 - 99 mg/dL Final    Urea Nitrogen 15 7 - 30 mg/dL Final    Creatinine 0.74 0.52 - 1.04 mg/dL Final    GFR Estimate 76 >60 mL/min/1.7m2 Final    Non  GFR Calc    GFR Estimate If Black >90 >60 mL/min/1.7m2 Final    African American GFR Calc    Calcium 8.8 8.5 - 10.1 mg/dL Final    Bilirubin Total 0.5 0.2 - 1.3 mg/dL Final    Albumin 3.8 3.4 - 5.0 g/dL Final    Protein Total  7.3 6.8 - 8.8 g/dL Final    Alkaline Phosphatase 65 40 - 150 U/L Final    ALT 26 0 - 50 U/L Final    AST 22 0 - 45 U/L Final         7/29/2018 12:31 PM      Component Results     Component Value Ref Range & Units Status    Ethanol g/dL <0.01 <0.01 g/dL Final         7/29/2018 12:07 PM                Clinical Quality Measure: Blood Pressure Screening     Your blood pressure was checked while you were in the emergency department today. The last reading we obtained was  BP: 122/71 . Please read the guidelines below about what these numbers mean and what you should do about them.  If your systolic blood pressure (the top number) is less than 120 and your diastolic blood pressure (the bottom number) is less than 80, then your blood pressure is normal. There is nothing more that you need to do about it.  If your systolic blood pressure (the top number) is 120-139 or your diastolic blood pressure (the bottom number) is 80-89, your blood pressure may be higher than it should be. You should have your blood pressure rechecked within a year by a primary care provider.  If your systolic blood pressure (the top number) is 140 or greater or your diastolic blood pressure (the bottom number) is 90 or greater, you may have high blood pressure. High blood pressure is treatable, but if left untreated over time it can put you at risk for heart attack, stroke, or kidney failure. You should have your blood pressure rechecked by a primary care provider within the next 4 weeks.  If your provider in the emergency department today gave you specific instructions to follow-up with your doctor or provider even sooner than that, you should follow that instruction and not wait for up to 4 weeks for your follow-up visit.        Thank you for choosing [facility]       Thank you for choosing [facility] for your care. Our goal is always to provide you with excellent care. Hearing back from our patients is one way we can continue to improve our services.  Please take a few minutes to complete the written survey that you may receive in the mail after you visit with us. Thank you!        [employer] Information     [employer] gives you secure access to your electronic health record. If you see a primary care provider, you can also send messages to your care team and make appointments. If you have questions, please call your primary care clinic.  If you do not have a primary care provider, please call [phone] and they will assist you.        Care EveryWhere ID     This is your Care EveryWhere ID. This could be used by other organizations to access your [facility] medical records  [id number]        Equal Access to Services     [facility]OL[facility]EL[facility] Medical CenterARTURO : Lit Terry, nam licona, aleks coon, morgan villarreal . So [facility] [phone].    ATENCIÓN: Si habla español, tiene a agarwal disposición servicios gratuitos de asistencia lingüística. Llame al [phone].    We comply with applicable federal civil rights laws and [place] laws. We do not discriminate on the basis of race, color, national origin, age, disability, sex, sexual orientation, or gender identity.            After Visit Summary       This is your record. Keep this with you and show to your community pharmacist(s) and doctor(s) at your next visit.

## 2018-07-29 NOTE — ED AVS SNAPSHOT
Emergency Department    [address] [place] [address]    Phone:  [phone]    Fax:  [phone]                                       Sintia Connors   MRN: [id number]    Department:   Emergency Department   Date of Visit:  7/29/2018           After Visit Summary Signature Page     I have received my discharge instructions, and my questions have been answered. I have discussed any challenges I see with this plan with the nurse or doctor.    ..........................................................................................................................................  Patient/Patient Representative Signature      ..........................................................................................................................................  Patient Representative Print Name and Relationship to Patient    ..................................................               ................................................  Date                                            Time    ..........................................................................................................................................  Reviewed by Signature/Title    ...................................................              ..............................................  Date                                                            Time

## 2018-07-29 NOTE — ED NOTES
Contacted Pérez (son) who stated they are still working on setting up her apartment in [facility] and brother Samuel ([phone]) will be here around 2000 to  patient.

## 2018-07-30 LAB — LEVETIRACETAM SERPL-MCNC: 24 UG/ML (ref 12–46)

## 2018-07-30 NOTE — ED NOTES
"Pt states that she is going to refuse to get in a car with her son Basil, \" I don't feel safe.  The only way I am getting in the car is if I have ammunition.\"  Pt has stated that she will allow her son Pérez to pick her up.  "

## 2018-08-10 ENCOUNTER — TELEPHONE (OUTPATIENT)
Dept: FAMILY MEDICINE | Facility: CLINIC | Age: 83
End: 2018-08-10

## 2018-08-10 NOTE — TELEPHONE ENCOUNTER
Reason for Call:  Updated Medication list     Detailed comments: Spoke with Rashida from [facility] in [place] and she is requesting an updated Medication list with a doctors Signature,   Pt moved in and need this signed in order for the PT to receive her Medications     Ph. [phone]   fax: [phone]        Can we leave a detailed message on this number? NO    Call taken on 8/10/2018 at 2:26 PM by Nanda Minaya

## 2018-08-10 NOTE — TELEPHONE ENCOUNTER
Spoke with Rashida with [facility]:     S: Pt just admitted to facility, and family brought medications to RN     They do not have any orders to go by:     Med list faxed for now for reference    Med List placed in PCP's in-basket at desk for PCP review/sign and we will refax. Attn: Rashida FAX: [phone]     Norma CONTRERAS RN

## 2018-08-12 ENCOUNTER — HEALTH MAINTENANCE LETTER (OUTPATIENT)
Age: 83
End: 2018-08-12

## 2018-08-28 ENCOUNTER — TRANSFERRED RECORDS (OUTPATIENT)
Dept: HEALTH INFORMATION MANAGEMENT | Facility: CLINIC | Age: 83
End: 2018-08-28

## 2018-09-18 ENCOUNTER — ASSISTED LIVING VISIT (OUTPATIENT)
Dept: GERIATRICS | Facility: CLINIC | Age: 83
End: 2018-09-18
Payer: MEDICARE

## 2018-09-18 DIAGNOSIS — R56.9 SEIZURES (H): ICD-10-CM

## 2018-09-18 DIAGNOSIS — I63.40 CEREBRAL INFARCTION DUE TO EMBOLISM OF CEREBRAL ARTERY (H): ICD-10-CM

## 2018-09-18 DIAGNOSIS — M81.0 SENILE OSTEOPOROSIS: ICD-10-CM

## 2018-09-18 DIAGNOSIS — M05.79 RHEUMATOID ARTHRITIS INVOLVING MULTIPLE SITES WITH POSITIVE RHEUMATOID FACTOR (H): ICD-10-CM

## 2018-09-18 DIAGNOSIS — I48.0 PAROXYSMAL ATRIAL FIBRILLATION (H): Primary | ICD-10-CM

## 2018-09-18 DIAGNOSIS — K21.9 GASTROESOPHAGEAL REFLUX DISEASE WITHOUT ESOPHAGITIS: ICD-10-CM

## 2018-09-18 DIAGNOSIS — F22 PARANOIA (H): ICD-10-CM

## 2018-09-18 NOTE — LETTER
9/18/2018        RE: Sintia Connors  [facility]  [address] [address]        [facility]  PRIMARY CARE PROVIDER AND CLINIC:  Brie Newberry [facility]  / [place] [address]  Chief Complaint   Patient presents with     Clinic Care Coordination - Initial     [facility] Medical Record Number:  [id number]    HPI:    Sintia Connors is a 83 year old  (1935),being seen today to [place] [facility]  Admitted to this facility 7/29/18 for medical management and cares.  HPI information obtained from: facility staff, patient report, [facility] chart review and Care Everywhere [facility] chart review.  Current issues are:        1. Paroxysmal atrial fibrillation (H)    2. Gastroesophageal reflux disease without esophagitis    3. 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 Seizures (H)    5. Paranoia (H)    6. Senile osteoporosis    7. Rheumatoid arthritis involving multiple sites with positive rheumatoid factor (H)      Came to see Sintia today and invited this NP in after she opened the door and explained why the visit.  Staff had warned this NP that she was fixated on someone coming into her apartment and swinging keys in her face, taunting her that they have keys to the lock medication box.  Staff had to look under the couch for her medication box.  Sintia self administers her medications as staff set them up.    Family whom lives out of state has signed Sintia up with [facility] so that she does not need to go out to the clinic anymore.      CODE STATUS/ADVANCE DIRECTIVES DISCUSSION:   CPR/Full code   Patient's living condition: lives in an assisted living facility    ALLERGIES:Lorazepam  PAST MEDICAL HISTORY:  has a past medical history of Atrial flutter (H) (4/12/15); Cerebral infarction (H); GERD (gastroesophageal reflux disease); PVC (premature ventricular contraction); Rheumatoid arthritis (H); S/P  lumpectomy of breast; Seizures (H); and Senile osteoporosis.  PAST SURGICAL HISTORY:  has a past surgical history that includes hernia repair; hernia repair; Hysterectomy total abdominal, bilateral salpingo-oophorectomy, combined; appendectomy; Athroplasty right knee (4/2015); and TOTAL KNEE ARTHROPLASTY (Left, 09/2015).  FAMILY HISTORY: family history is negative for HEART DISEASE and Arrhythmia.  SOCIAL HISTORY:  reports that she quit smoking about 35 years ago. She has never used smokeless tobacco. She reports that she does not drink alcohol or use illicit drugs.    Post Discharge Medication Reconciliation Status: discharge medications reconciled and changed, per note/orders (see AVS).  Current Outpatient Prescriptions   Medication Sig Dispense Refill     acetaminophen (TYLENOL) 500 MG tablet Take 500 mg by mouth every 8 hours as needed for mild pain       ASPIRIN PO Take 81 mg by mouth daily       calcium carbonate (TUMS) 500 MG chewable tablet Take 1 chew tab by mouth every 8 hours as needed for heartburn  90 tablet      diclofenac (VOLTAREN) 1 % GEL APPLY 4 GRAMS TO KNEES OR 2 GRAMS TO HANDS FOUR TIMES DAILY USING ENCLOSED DOSING CARD. 100 g 3     esomeprazole (NEXIUM) 40 MG CR capsule Take 1 capsule (40 mg) by mouth every morning (before breakfast) Take 30-60 minutes before a eating. 30 capsule 11     glucosamine-chondroitin 500-400 MG CAPS per capsule Take 1 capsule by mouth 3 times daily 270 capsule 3     levETIRAcetam (KEPPRA) 750 MG tablet Take 1 tablet (750 mg) by mouth 2 times daily 180 tablet 3     metoprolol tartrate (LOPRESSOR) 25 MG tablet TAKE 1 AND 1/2 TABLETS(37.5 MG) BY MOUTH TWICE DAILY 270 tablet 3     mirtazapine (REMERON) 15 MG tablet Take 1 tablet (15 mg) by mouth At Bedtime 90 tablet 3     multivitamin, therapeutic with minerals (MULTI-VITAMIN) TABS Take 1 tablet by mouth daily.       QUEtiapine (SEROQUEL) 25 MG tablet TAKE 1 TABLET(25 MG) BY MOUTH TWICE DAILY 180 tablet 3     VITAMIN D,  "CHOLECALCIFEROL, PO Take 1,000 Units by mouth daily       ORDER FOR DME Equipment being ordered: Shower Chair 1 each 0     ORDER FOR DME Equipment being ordered: Bath transfer bench  Fax to:[phone] 1 Device 0     ORDER FOR DME Equipment being ordered: transfer bath bench 1 Device 0       ROS:  10 point ROS of systems including Constitutional, Eyes, Respiratory, Cardiovascular, Gastroenterology, Genitourinary, Integumentary, Muscularskeletal, Psychiatric were all negative except for pertinent positives noted in my HPI.    Exam:  /66  Pulse 72  Resp 16  Ht 5' 2\" (1.575 m)  BMI 30 kg/m2  Refused weight  GENERAL APPEARANCE:  Alert, oriented, cooperative, loud, delusional  EYES:  PERRL, Conjunctiva and lids normal  RESP:  respiratory effort and palpation of chest normal, lungs clear to auscultation , no respiratory distress  CV:  Palpation and auscultation of heart done , regular rate and rhythm, no murmur, rub, or gallop, trace edema, wearing nylons  ABDOMEN:  abdomen firm and round.  bowel sounds present  M/S:   Gait and station abnormal ambulates with walker, able to transfer self.  hand grasp equal  SKIN:  Inspection of skin and subcutaneous tissue baseline, Palpation of skin and subcutaneous tissue baseline  PSYCH:  oriented X 3, insight and judgement impaired, constant talking, loud, paranoid about people coming in and swinging keys in her face \"Ha, HA, I got keys to medications box and you don't\"  actually hid her medication box under her couch    Lab/Diagnostic data:  CBC RESULTS:   Recent Labs   Lab Test  07/29/18   1200  11/10/17   1500   WBC  6.6  11.4*   RBC  4.85  5.15   HGB  14.0  14.7   HCT  43.8  46.6   MCV  90  91   MCH  28.9  28.5   MCHC  32.0  31.5   RDW  13.8  14.8   PLT  194  241       Last Basic Metabolic Panel:  Recent Labs   Lab Test  07/29/18   1200  06/20/17   1703   NA  144  142   POTASSIUM  4.1  4.3   CHLORIDE  110*  110*   FLAVIA  8.8  10.0   CO2  26  22   BUN  15  19   CR  0.74  " 0.68   GLC  105*  91       Liver Function Studies -   Recent Labs   Lab Test  18   1200  03/15/16   1538   PROTTOTAL  7.3  7.3   ALBUMIN  3.8  4.0   BILITOTAL  0.5  0.6   ALKPHOS  65  92   AST  22  22   ALT  26  27       ASSESSMENT/PLAN:  Paroxysmal atrial fibrillation (H)  Currently on Metoprolol 37.5mg BID.  No complaints today.  No pacemaker.      Gastroesophageal reflux disease without esophagitis  Is on Nexium 40mg daily.  No complaints of stomach discomfort.    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  Minor memory loss and continues to repeat stroies but feel that is part of her paranoid state.    Seizures (H)  Stated she has had seizures since age 18.  Is on Keppra 750mg BID.  Did have a Keppra level in July.  Seems to tolerate well.      Paranoia (H)  Is on Seroquel 25mg BID.  Feel she could use a higher dose but feel she needs to trust this NP versus changing pills around.    Senile osteoporosis  Remains on Vitamin D 1000 units daily   Will check a Vitamin D level.    Rheumatoid arthritis involving multiple sites with positive rheumatoid factor (H)  Does take Glucosamine-Chondroitin 500-400 daily, Calcium carbonate prn and Vitamin D.  Is on a MVI and has PRN tylenol and Volteran Gel PRN for joint pain.  No complaints of pain.  Continue to monitor.  Ambulates with a walker.       With her behaviors as they are, would like to check a urine to make sure it is not an infection that influences her thought process.  Will get her medications up to date as well.    Orders:  1) UA/UC- CVA, Seizures, psychosis  2) Next lab day- TSH, CBC, BMP, Vit D and B12 Level- seizures, old CVA, HTN    Total time with a new patient visit in the assisted livin minutes including discussions about the POC and care coordination with the patient and facility nursing staff. Greater than 50% of total time spent with counseling and coordinating care due to new patient, behaviors of  patient - she talked and talked, cordinating care with nursing.    Electronically signed by:  RONY Patino CNP                    Sincerely,        RONY Patino CNP

## 2018-09-18 NOTE — MR AVS SNAPSHOT
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  Normal or non-critical lab and imaging results will be communicated to you by [facility] Powerhart, letter or phone within 4 business days after the clinic has received the results. If you do not hear from us within 7 days, please contact the clinic through [employer] Powerhart or phone. If you have a critical or abnormal lab result, we will notify you by phone as soon as possible.  Submit refill requests through Frontback or call your pharmacy and they will forward the refill request to us. Please allow 3 business days for your refill to be completed.          Additional Information About Your Visit        MyChart Information     Frontback gives you secure access to your electronic health record. If you see a primary care provider, you can also send messages to your care team and make appointments. If you have questions, please call your primary care clinic.  If you do not have a primary care provider, please call [phone] and they will assist you.        Care EveryWhere ID     This is your Care EveryWhere ID. This could be used by other organizations to access your [facility] medical records  [id number]    "     Your Vitals Were     Pulse Respirations Height BMI (Body Mass Index)          72 16 5' 2\" (1.575 m) 30 kg/m2         Blood Pressure from Last 3 Encounters:   09/18/18 134/66   07/29/18 109/58   06/11/18 132/65    Weight from Last 3 Encounters:   11/10/17 164 lb (74.4 kg)   08/01/17 164 lb (74.4 kg)   06/20/17 164 lb (74.4 kg)              Today, you had the following     No orders found for display         Today's Medication Changes          These changes are accurate as of 9/18/18 11:59 PM.  If you have any questions, ask your nurse or doctor.               These medicines have changed or have updated prescriptions.        Dose/Directions    TYLENOL 500 MG tablet   This may have changed:  Another medication with the same name was removed. Continue taking this medication, and follow the directions you see here.   Generic drug:  acetaminophen        Dose:  500 mg   Take 500 mg by mouth every 8 hours as needed for mild pain   Refills:  0       VITAMIN D (CHOLECALCIFEROL) PO   This may have changed:  Another medication with the same name was removed. Continue taking this medication, and follow the directions you see here.        Dose:  2000 Units   Take 2,000 Units by mouth daily   Refills:  0         Stop taking these medicines if you haven't already. Please contact your care team if you have questions.     flunisolide 25 MCG/ACT (0.025%) Soln spray   Commonly known as:  NASALIDE           vitamin B-Complex                    Primary Care Provider Office Phone # Fax #    Brie RONY Schmidt [phone]       [address] [place] [address]        Equal Access to Services     [facility]: Hadii felipe cooley Sonils, waaxda luqadaha, qaybta kaalmada shaggy, morgan castrejon. So [facility] [phone].    ATENCIÓN: Si habla español, tiene a agarwal disposición servicios gratuitos de asistencia lingüística. Llame al [phone].    We comply with applicable " federal civil rights laws and [place] laws. We do not discriminate on the basis of race, color, national origin, age, disability, sex, sexual orientation, or gender identity.            Thank you!     Thank you for choosing [facility]  for your care. Our goal is always to provide you with excellent care. Hearing back from our patients is one way we can continue to improve our services. Please take a few minutes to complete the written survey that you may receive in the mail after your visit with us. Thank you!             Your Updated Medication List - Protect others around you: Learn how to safely use, store and throw away your medicines at www.disposemymeds.org.          This list is accurate as of 9/18/18 11:59 PM.  Always use your most recent med list.                   Brand Name Dispense Instructions for use Diagnosis    ASPIRIN PO      Take 81 mg by mouth daily        calcium carbonate 500 MG chewable tablet    TUMS    90 tablet    Take 1 chew tab by mouth every 8 hours as needed for heartburn        diclofenac 1 % Gel topical gel    VOLTAREN    100 g    APPLY 4 GRAMS TO KNEES OR 2 GRAMS TO HANDS FOUR TIMES DAILY USING ENCLOSED DOSING CARD.    Other secondary osteoarthritis of knee       esomeprazole 40 MG CR capsule    nexIUM    30 capsule    Take 1 capsule (40 mg) by mouth every morning (before breakfast) Take 30-60 minutes before a eating.    Gastroesophageal reflux disease with esophagitis, Abdominal pain, epigastric       glucosamine-chondroitin 500-400 MG Caps per capsule     270 capsule    Take 1 capsule by mouth 3 times daily    Primary osteoarthritis involving multiple joints       levETIRAcetam 750 MG tablet    KEPPRA    180 tablet    Take 1 tablet (750 mg) by mouth 2 times daily    Seizure (H)       metoprolol tartrate 25 MG tablet    LOPRESSOR    270 tablet    TAKE 1 AND 1/2 TABLETS(37.5 MG) BY MOUTH TWICE DAILY    Paroxysmal atrial fibrillation (H)       mirtazapine 15 MG tablet     REMERON    90 tablet    Take 1 tablet (15 mg) by mouth At Bedtime    Anxiety       Multi-vitamin Tabs tablet      Take 1 tablet by mouth daily.        order for DME     1 Device    Equipment being ordered: transfer bath bench    Osteoarthritis       order for DME     1 Device    Equipment being ordered: Bath transfer bench Fax to:[phone]    Osteoarthritis       order for DME     1 each    Equipment being ordered: Shower Chair    Osteoarthritis of knee       QUEtiapine 25 MG tablet    SEROquel    180 tablet    TAKE 1 TABLET(25 MG) BY MOUTH TWICE DAILY    Anxiety       TYLENOL 500 MG tablet   Generic drug:  acetaminophen      Take 500 mg by mouth every 8 hours as needed for mild pain        VITAMIN D (CHOLECALCIFEROL) PO      Take 2,000 Units by mouth daily

## 2018-09-18 NOTE — PROGRESS NOTES
[facility]  PRIMARY CARE PROVIDER AND CLINIC:  Brie Newberry [address]  / [facility] [address]  Chief Complaint   Patient presents with     Clinic Care Coordination - Initial     [facility] Medical Record Number:  [id number]    HPI:    Sintia Connors is a 83 year old  (1935),being seen today to establish care at the [facility].  Admitted to this facility 7/29/18 for medical management and cares.  HPI information obtained from: facility staff, patient report, [facility] chart review and Care Everywhere [facility] chart review.  Current issues are:        1. Paroxysmal atrial fibrillation (H)    2. Gastroesophageal reflux disease without esophagitis    3. 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 Seizures (H)    5. Paranoia (H)    6. Senile osteoporosis    7. Rheumatoid arthritis involving multiple sites with positive rheumatoid factor (H)      Came to see Sintia today and invited this NP in after she opened the door and explained why the visit.  Staff had warned this NP that she was fixated on someone coming into her apartment and swinging keys in her face, taunting her that they have keys to the lock medication box.  Staff had to look under the couch for her medication box.  Sintia self administers her medications as staff set them up.    Family whom lives out of state has signed Sitnia up with [facility] so that she does not need to go out to the clinic anymore.      CODE STATUS/ADVANCE DIRECTIVES DISCUSSION:   CPR/Full code   Patient's living condition: lives in an assisted living facility    ALLERGIES:Lorazepam  PAST MEDICAL HISTORY:  has a past medical history of Atrial flutter (H) (4/12/15); Cerebral infarction (H); GERD (gastroesophageal reflux disease); PVC (premature ventricular contraction); Rheumatoid arthritis (H); S/P lumpectomy of breast; Seizures (H); and Senile osteoporosis.  PAST SURGICAL HISTORY:  has a past surgical history that  includes hernia repair; hernia repair; Hysterectomy total abdominal, bilateral salpingo-oophorectomy, combined; appendectomy; Athroplasty right knee (4/2015); and TOTAL KNEE ARTHROPLASTY (Left, 09/2015).  FAMILY HISTORY: family history is negative for HEART DISEASE and Arrhythmia.  SOCIAL HISTORY:  reports that she quit smoking about 35 years ago. She has never used smokeless tobacco. She reports that she does not drink alcohol or use illicit drugs.    Post Discharge Medication Reconciliation Status: discharge medications reconciled and changed, per note/orders (see AVS).  Current Outpatient Prescriptions   Medication Sig Dispense Refill     acetaminophen (TYLENOL) 500 MG tablet Take 500 mg by mouth every 8 hours as needed for mild pain       ASPIRIN PO Take 81 mg by mouth daily       calcium carbonate (TUMS) 500 MG chewable tablet Take 1 chew tab by mouth every 8 hours as needed for heartburn  90 tablet      diclofenac (VOLTAREN) 1 % GEL APPLY 4 GRAMS TO KNEES OR 2 GRAMS TO HANDS FOUR TIMES DAILY USING ENCLOSED DOSING CARD. 100 g 3     esomeprazole (NEXIUM) 40 MG CR capsule Take 1 capsule (40 mg) by mouth every morning (before breakfast) Take 30-60 minutes before a eating. 30 capsule 11     glucosamine-chondroitin 500-400 MG CAPS per capsule Take 1 capsule by mouth 3 times daily 270 capsule 3     levETIRAcetam (KEPPRA) 750 MG tablet Take 1 tablet (750 mg) by mouth 2 times daily 180 tablet 3     metoprolol tartrate (LOPRESSOR) 25 MG tablet TAKE 1 AND 1/2 TABLETS(37.5 MG) BY MOUTH TWICE DAILY 270 tablet 3     mirtazapine (REMERON) 15 MG tablet Take 1 tablet (15 mg) by mouth At Bedtime 90 tablet 3     multivitamin, therapeutic with minerals (MULTI-VITAMIN) TABS Take 1 tablet by mouth daily.       QUEtiapine (SEROQUEL) 25 MG tablet TAKE 1 TABLET(25 MG) BY MOUTH TWICE DAILY 180 tablet 3     VITAMIN D, CHOLECALCIFEROL, PO Take 1,000 Units by mouth daily       ORDER FOR DME Equipment being ordered: Shower Chair 1 each 0  "    ORDER FOR DME Equipment being ordered: Bath transfer bench  Fax to:[phone] 1 Device 0     ORDER FOR DME Equipment being ordered: transfer bath bench 1 Device 0       ROS:  10 point ROS of systems including Constitutional, Eyes, Respiratory, Cardiovascular, Gastroenterology, Genitourinary, Integumentary, Muscularskeletal, Psychiatric were all negative except for pertinent positives noted in my HPI.    Exam:  /66  Pulse 72  Resp 16  Ht 5' 2\" (1.575 m)  BMI 30 kg/m2  Refused weight  GENERAL APPEARANCE:  Alert, oriented, cooperative, loud, delusional  EYES:  PERRL, Conjunctiva and lids normal  RESP:  respiratory effort and palpation of chest normal, lungs clear to auscultation , no respiratory distress  CV:  Palpation and auscultation of heart done , regular rate and rhythm, no murmur, rub, or gallop, trace edema, wearing nylons  ABDOMEN:  abdomen firm and round.  bowel sounds present  M/S:   Gait and station abnormal ambulates with walker, able to transfer self.  hand grasp equal  SKIN:  Inspection of skin and subcutaneous tissue baseline, Palpation of skin and subcutaneous tissue baseline  PSYCH:  oriented X 3, insight and judgement impaired, constant talking, loud, paranoid about people coming in and swinging keys in her face \"Ha, HA, I got keys to medications box and you don't\"  actually hid her medication box under her couch    Lab/Diagnostic data:  CBC RESULTS:   Recent Labs   Lab Test  07/29/18   1200  11/10/17   1500   WBC  6.6  11.4*   RBC  4.85  5.15   HGB  14.0  14.7   HCT  43.8  46.6   MCV  90  91   MCH  28.9  28.5   MCHC  32.0  31.5   RDW  13.8  14.8   PLT  194  241       Last Basic Metabolic Panel:  Recent Labs   Lab Test  07/29/18   1200  06/20/17   1703   NA  144  142   POTASSIUM  4.1  4.3   CHLORIDE  110*  110*   FLAVIA  8.8  10.0   CO2  26  22   BUN  15  19   CR  0.74  0.68   GLC  105*  91       Liver Function Studies -   Recent Labs   Lab Test  07/29/18   1200  03/15/16   1538 "   PROTTOTAL  7.3  7.3   ALBUMIN  3.8  4.0   BILITOTAL  0.5  0.6   ALKPHOS  65  92   AST  22  22   ALT  26  27       ASSESSMENT/PLAN:  Paroxysmal atrial fibrillation (H)  Currently on Metoprolol 37.5mg BID.  No complaints today.  No pacemaker.      Gastroesophageal reflux disease without esophagitis  Is on Nexium 40mg daily.  No complaints of stomach discomfort.    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  Minor memory loss and continues to repeat stroies but feel that is part of her paranoid state.    Seizures (H)  Stated she has had seizures since age 18.  Is on Keppra 750mg BID.  Did have a Keppra level in July.  Seems to tolerate well.      Paranoia (H)  Is on Seroquel 25mg BID.  Feel she could use a higher dose but feel she needs to trust this NP versus changing pills around.    Senile osteoporosis  Remains on Vitamin D 1000 units daily   Will check a Vitamin D level.    Rheumatoid arthritis involving multiple sites with positive rheumatoid factor (H)  Does take Glucosamine-Chondroitin 500-400 daily, Calcium carbonate prn and Vitamin D.  Is on a MVI and has PRN tylenol and Volteran Gel PRN for joint pain.  No complaints of pain.  Continue to monitor.  Ambulates with a walker.       With her behaviors as they are, would like to check a urine to make sure it is not an infection that influences her thought process.  Will get her medications up to date as well.    Orders:  1) UA/UC- CVA, Seizures, psychosis  2) Next lab day- TSH, CBC, BMP, Vit D and B12 Level- seizures, old CVA, HTN    Total time with a new patient visit in the assisted livin minutes including discussions about the POC and care coordination with the patient and facility nursing staff. Greater than 50% of total time spent with counseling and coordinating care due to new patient, behaviors of patient - she talked and talked, cordinating care with nursing.    Electronically signed by:  Brie Manuel  RONY Newberry CNP

## 2018-09-19 ENCOUNTER — TRANSFERRED RECORDS (OUTPATIENT)
Dept: HEALTH INFORMATION MANAGEMENT | Facility: CLINIC | Age: 83
End: 2018-09-19

## 2018-09-20 ENCOUNTER — TRANSFERRED RECORDS (OUTPATIENT)
Dept: HEALTH INFORMATION MANAGEMENT | Facility: CLINIC | Age: 83
End: 2018-09-20

## 2018-09-20 LAB
ANION GAP SERPL CALCULATED.3IONS-SCNC: 8 MMOL/L (ref 8–15)
BUN SERPL-MCNC: 13 MG/DL (ref 7–24)
CALCIUM SERPL-MCNC: 8.2 MG/DL (ref 8.5–10.1)
CHLORIDE SERPLBLD-SCNC: 111 MMOL/L (ref 98–112)
CO2 SERPL-SCNC: 24 MMOL/L (ref 21–31)
CREAT SERPL-MCNC: 0.61 MG/DL (ref 0.55–1.02)
ERYTHROCYTE [DISTWIDTH] IN BLOOD BY AUTOMATED COUNT: 13.4 % (ref 11.5–14.5)
GFR SERPL CREATININE-BSD FRML MDRD: >60 ML/MIN
GLUCOSE SERPL-MCNC: 97 MG/DL (ref 74–106)
HCT VFR BLD AUTO: 43.1 % (ref 36–48)
HEMOGLOBIN: 13.8 GM/DL (ref 12–16)
MCH RBC QN AUTO: 29 PG (ref 27–33)
MCHC RBC AUTO-ENTMCNC: 32 G/DL (ref 33–36)
MCV RBC AUTO: 90 FL (ref 80–100)
PLATELET # BLD AUTO: 198 K/UL (ref 150–400)
POTASSIUM SERPL-SCNC: 4.2 MMOL/L (ref 3.5–5.1)
RBC # BLD AUTO: 4.79 M/UL (ref 4.2–5.4)
SODIUM SERPL-SCNC: 143 MMOL/L (ref 136–145)
TSH SERPL-ACNC: 5.83 UIU/ML (ref 0.36–3.74)
WBC # BLD AUTO: 8.4 K/UL (ref 4.3–10.8)

## 2018-09-25 ENCOUNTER — ASSISTED LIVING VISIT (OUTPATIENT)
Dept: GERIATRICS | Facility: CLINIC | Age: 83
End: 2018-09-25
Payer: MEDICARE

## 2018-09-25 DIAGNOSIS — E55.9 VITAMIN D DEFICIENCY: ICD-10-CM

## 2018-09-25 DIAGNOSIS — Z71.89 ADVANCED DIRECTIVES, COUNSELING/DISCUSSION: ICD-10-CM

## 2018-09-25 DIAGNOSIS — I63.40 CEREBRAL INFARCTION DUE TO EMBOLISM OF CEREBRAL ARTERY (H): Primary | ICD-10-CM

## 2018-09-25 DIAGNOSIS — F22 PARANOIA (H): ICD-10-CM

## 2018-09-25 DIAGNOSIS — F41.9 ANXIETY: ICD-10-CM

## 2018-09-25 NOTE — MR AVS SNAPSHOT
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  Normal or non-critical lab and imaging results will be communicated to you by Instamourhart, letter or phone within 4 business days after the clinic has received the results. If you do not hear from us within 7 days, please contact the clinic through Instamourhart or phone. If you have a critical or abnormal lab result, we will notify you by phone as soon as possible.  Submit refill requests through Coghead or call your pharmacy and they will forward the refill request to us. Please allow 3 business days for your refill to be completed.          Additional Information About Your Visit        MyChart Information     Coghead gives you secure access to your electronic health record. If you see a primary care provider, you can also send messages to your care team and make appointments. If you have questions, please call your primary care clinic.  If you do not have a primary care provider, please call [phone] and they will assist you.        Care EveryWhere ID     This is your Care EveryWhere ID. This could be used by other organizations to access your [facility] medical records  [id number]         Blood Pressure from Last 3 Encounters:   09/25/18 142/76   09/18/18 134/66   07/29/18 109/58    Weight from Last 3 Encounters:   11/10/17  164 lb (74.4 kg)   08/01/17 164 lb (74.4 kg)   06/20/17 164 lb (74.4 kg)              Today, you had the following     No orders found for display       Primary Care Provider Office Phone # Fax #    RONY Patino [phone]       [address] [place] [address]        Equal Access to Services     [facility] : Hadii aad ku hadasho Soomaali, waaxda luqadaha, qaybta kaalmada adeegyada, waxay idiin hayaan adeeg kharash la'aan . So [facility] [phone].    ATENCIÓN: Si habla español, tiene a agarwal disposición servicios gratuitos de asistencia lingüística. Llame al [phone].    We comply with applicable federal civil rights laws and [place] laws. We do not discriminate on the basis of race, color, national origin, age, disability, sex, sexual orientation, or gender identity.            Thank you!     Thank you for choosing [facility]  for your care. Our goal is always to provide you with excellent care. Hearing back from our patients is one way we can continue to improve our services. Please take a few minutes to complete the written survey that you may receive in the mail after your visit with us. Thank you!             Your Updated Medication List - Protect others around you: Learn how to safely use, store and throw away your medicines at www.disposemymeds.org.          This list is accurate as of 9/25/18 11:59 PM.  Always use your most recent med list.                   Brand Name Dispense Instructions for use Diagnosis    ASPIRIN PO      Take 81 mg by mouth daily        calcium carbonate 500 MG chewable tablet    TUMS    90 tablet    Take 1 chew tab by mouth every 8 hours as needed for heartburn        diclofenac 1 % Gel topical gel    VOLTAREN    100 g    APPLY 4 GRAMS TO KNEES OR 2 GRAMS TO HANDS FOUR TIMES DAILY USING ENCLOSED DOSING CARD.    Other secondary osteoarthritis of knee       esomeprazole 40 MG CR capsule    nexIUM    30 capsule    Take 1 capsule (40  mg) by mouth every morning (before breakfast) Take 30-60 minutes before a eating.    Gastroesophageal reflux disease with esophagitis, Abdominal pain, epigastric       glucosamine-chondroitin 500-400 MG Caps per capsule     270 capsule    Take 1 capsule by mouth 3 times daily    Primary osteoarthritis involving multiple joints       levETIRAcetam 750 MG tablet    KEPPRA    180 tablet    Take 1 tablet (750 mg) by mouth 2 times daily    Seizure (H)       metoprolol tartrate 25 MG tablet    LOPRESSOR    270 tablet    TAKE 1 AND 1/2 TABLETS(37.5 MG) BY MOUTH TWICE DAILY    Paroxysmal atrial fibrillation (H)       Multi-vitamin Tabs tablet      Take 1 tablet by mouth daily.        order for DME     1 Device    Equipment being ordered: transfer bath bench    Osteoarthritis       order for DME     1 Device    Equipment being ordered: Bath transfer bench Fax to:[phone]    Osteoarthritis       order for DME     1 each    Equipment being ordered: Shower Chair    Osteoarthritis of knee       TYLENOL 500 MG tablet   Generic drug:  acetaminophen      Take 500 mg by mouth every 8 hours as needed for mild pain        VITAMIN D (CHOLECALCIFEROL) PO      Take 2,000 Units by mouth daily

## 2018-09-25 NOTE — PROGRESS NOTES
"[place] [facility]    Chief Complaint   Patient presents with     TIA       [facility] Medical Record Number:  [id number]    HPI:    Sintia Connors is a 83 year old  (1935), who is being seen today for an episodic care visit at [facility] .   HPI information obtained from: facility chart records, facility staff and patient report. Today's concern is:    1. 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 Paranoia (H)    3. Anxiety    4. Advanced directives, counseling/discussion    5. Vitamin D deficiency      - came to see Sintia today with NP student.  Sintia had blood work done on 9/20/18 and so observing the results.  Staff had sent them to this NP and adjustment make to the Vitamin D dose she was taking.  Was 1000 units and now moved it to 2000 units.  Sintia has told the staff that she likes this NP.  Want her to build trust due to her paranoia.  Another reason seeing her today is to fill out a POLST form.  Felt this was not going to be easy and so wanted to dedicate time.  Sintia goes off on \"rants\" over and over again.  Easy to let her talk and agree with her.    \"why didn't you call first?  You have a phone right?\"  Still let this NP come in to see her.    REVIEW OF SYSTEMS:  4 point ROS including Respiratory, CV, GI and , other than that noted in the HPI,  is negative    /76  GENERAL APPEARANCE:  Alert, in no distress  Talking non-stop.  Ambulating with her walker.  Sat on the edge of her day bed.  Heart rate regular and strong.  Lungs are clear.    No edema.    9/20/18  HgB = 13.8  Na = 143   K+ = 4.2  BUN = 13  Cr = 0.61 with GFR >90  Calcium = 8.2  Vitamin D - ? Not on report  UA/UC showed mixed patti with 3 or more organisms  None predominant    ASSESSMENT/PLAN:  1. 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 Paranoia (H)    3. Anxiety    4. Advanced directives, counseling/discussion    5. Vitamin D deficiency      - labs are looking " good.  Does not have a UTI.  Questioned this due to her behaviors.    Will keep in mind in future to increase medications as she continues with paranoid thoughts.    Did bring up about the POLST form and she stated she has one filled out.  Sintia went to her kitchen and brought back her POLST from her previous MD.  She would not fill out a new one this time.  Stated to take the one filled out and file it where it needed to be.  Reviewed with her that she wants CPR and that remains the same.  Will have staff bring her original back to her.      No new orders today expect to send the POLST to [facility].  Vitamin D already increased to 2000 units daily.        Electronically signed by:  RONY Patino CNP

## 2018-09-25 NOTE — LETTER
"    9/25/2018        RE: Sintia Connors  [facility]  [address] [address]        [place]    Chief Complaint   Patient presents with     ANDREWECK       [facility] Medical Record Number:  [id number]    HPI:    Sintia Connors is a 83 year old  (1935), who is being seen today for an episodic care visit at [facility] .   HPI information obtained from: facility chart records, facility staff and patient report. Today's concern is:    1. 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 Paranoia (H)    3. Anxiety    4. Advanced directives, counseling/discussion    5. Vitamin D deficiency      - came to see Sintia today with NP student.  Sintia had blood work done on 9/20/18 and so observing the results.  Staff had sent them to this NP and adjustment make to the Vitamin D dose she was taking.  Was 1000 units and now moved it to 2000 units.  Sintia has told the staff that she likes this NP.  Want her to build trust due to her paranoia.  Another reason seeing her today is to fill out a POLST form.  Felt this was not going to be easy and so wanted to dedicate time.  Sintia goes off on \"rants\" over and over again.  Easy to let her talk and agree with her.    \"why didn't you call first?  You have a phone right?\"  Still let this NP come in to see her.    REVIEW OF SYSTEMS:  4 point ROS including Respiratory, CV, GI and , other than that noted in the HPI,  is negative    /76  GENERAL APPEARANCE:  Alert, in no distress  Talking non-stop.  Ambulating with her walker.  Sat on the edge of her day bed.  Heart rate regular and strong.  Lungs are clear.    No edema.    9/20/18  HgB = 13.8  Na = 143   K+ = 4.2  BUN = 13  Cr = 0.61 with GFR >90  Calcium = 8.2  Vitamin D - ? Not on report  UA/UC showed mixed patti with 3 or more organisms  None predominant    ASSESSMENT/PLAN:  1. 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 Paranoia (H)  "   3. Anxiety    4. Advanced directives, counseling/discussion    5. Vitamin D deficiency      - labs are looking good.  Does not have a UTI.  Questioned this due to her behaviors.    Will keep in mind in future to increase medications as she continues with paranoid thoughts.    Did bring up about the POLST form and she stated she has one filled out.  Sintia went to her kitchen and brought back her POLST from her previous MD.  She would not fill out a new one this time.  Stated to take the one filled out and file it where it needed to be.  Reviewed with her that she wants CPR and that remains the same.  Will have staff bring her original back to her.      No new orders today expect to send the POLST to [facility].  Vitamin D already increased to 2000 units daily.        Electronically signed by:  RONY Patino CNP      Sincerely,        RONY Patino CNP

## 2018-10-03 ENCOUNTER — ASSISTED LIVING VISIT (OUTPATIENT)
Dept: GERIATRICS | Facility: CLINIC | Age: 83
End: 2018-10-03
Payer: MEDICARE

## 2018-10-03 VITALS
HEART RATE: 72 BPM | BODY MASS INDEX: 30 KG/M2 | RESPIRATION RATE: 16 BRPM | HEIGHT: 62 IN | DIASTOLIC BLOOD PRESSURE: 66 MMHG | SYSTOLIC BLOOD PRESSURE: 134 MMHG

## 2018-10-03 VITALS — DIASTOLIC BLOOD PRESSURE: 76 MMHG | SYSTOLIC BLOOD PRESSURE: 142 MMHG

## 2018-10-03 DIAGNOSIS — F41.9 ANXIETY: ICD-10-CM

## 2018-10-03 DIAGNOSIS — I63.9 CEREBROVASCULAR ACCIDENT (CVA), UNSPECIFIED MECHANISM (H): Primary | ICD-10-CM

## 2018-10-03 DIAGNOSIS — F22 PARANOIA (H): ICD-10-CM

## 2018-10-03 RX ORDER — ACETAMINOPHEN 500 MG
500 TABLET ORAL EVERY 8 HOURS PRN
Status: ON HOLD | COMMUNITY
End: 2019-01-01

## 2018-10-03 RX ORDER — QUETIAPINE FUMARATE 25 MG/1
TABLET, FILM COATED ORAL
Qty: 180 TABLET | Refills: 3 | Status: SHIPPED | OUTPATIENT
Start: 2018-10-03 | End: 2018-10-30

## 2018-10-03 NOTE — PROGRESS NOTES
"[facility]    Chief Complaint   Patient presents with     TIA       [facility] Medical Record Number:  [id number]    HPI:    Sintia Connors is a 83 year old  (1935), who is being seen today for an episodic care visit at [facility] .   HPI information obtained from: facility staff and patient report. Today's concern:    1. Cerebrovascular accident (CVA), unspecified mechanism (H)    2. Anxiety    3. Paranoia (H)      - nursing called yesterday to update this NP that Sintia's behaviors seem to be worsening with her paranoia.  She continues with thinking staff dangle keys in her face to taunt her.    Came to see her today to talk to her about her medication and increasing her Seroquel.  While down in the nursing office she called one staff person to remind her she was going to make a sign for her door so people know not to come in using their keys.  She would rather open the door herself.  \"you still want your job, you better make the sign\".    This NP came to see her and she allowed a visit.  She was loud in her voice and caught herself with how loud she was.  \"I cant live this way\".  \"People coming in here.  They need to put the food outside my door and I will get it\"    REVIEW OF SYSTEMS:  4 point ROS including Respiratory, CV, GI and , other than that noted in the HPI,  is negative    140/86 B/P  GENERAL APPEARANCE:  Alert, in no distress  Ambulating with her walker.  States she is going out to dinner tonight.  Went through her medications. She showed my what she does which is moving evening medications to another strip and putting them by her bed.  Heart rate regular and strong.  Lungs are clear.    She talked and talked non-stop and repeated her stories.  She makes up grand stories.  Easier to agree with her than to contradict her.    ASSESSMENT/PLAN:  1. Cerebrovascular accident (CVA), unspecified mechanism (H)    2. Anxiety    3. Paranoia (H)      - feel that with her " CVA, this probably changed her outlook and behavior.  She seemed to understand that this NP wanted to increase her medication in order to hopefully calm her down and less paranoid.  Staff also asked if her Remeron could be discontinued due to refusing to take the medication.    Staff set up her medication and she gives herself.      ORDERS:  1. discontinue Remeron due to refusal  2.  Increase Seroquel to 37.5mg BID from 25mg BID.  Would like her to have it 3x day but feel compliance would be an issue.  Will add a half of dose to each pill and continue to monitor response.      Electronically signed by:  Brie Newberry RN GNP

## 2018-10-03 NOTE — LETTER
"    10/3/2018        RE: Sintia Connors  [facility]  [address] [address]        [place] [facility]    Chief Complaint   Patient presents with     TIA       [facility] Medical Record Number:  [id number]    HPI:    Sintia Connors is a 83 year old  (1935), who is being seen today for an episodic care visit at [facility] .   HPI information obtained from: facility staff and patient report. Today's concern:    1. Cerebrovascular accident (CVA), unspecified mechanism (H)    2. Anxiety    3. Paranoia (H)      - nursing called yesterday to update this NP that Stepans behaviors seem to be worsening with her paranoia.  She continues with thinking staff dangle keys in her face to taunt her.    Came to see her today to talk to her about her medication and increasing her Seroquel.  While down in the nursing office she called one staff person to remind her she was going to make a sign for her door so people know not to come in using their keys.  She would rather open the door herself.  \"you still want your job, you better make the sign\".    This NP came to see her and she allowed a visit.  She was loud in her voice and caught herself with how loud she was.  \"I cant live this way\".  \"People coming in here.  They need to put the food outside my door and I will get it\"    REVIEW OF SYSTEMS:  4 point ROS including Respiratory, CV, GI and , other than that noted in the HPI,  is negative    140/86 B/P  GENERAL APPEARANCE:  Alert, in no distress  Ambulating with her walker.  States she is going out to dinner tonight.  Went through her medications. She showed my what she does which is moving evening medications to another strip and putting them by her bed.  Heart rate regular and strong.  Lungs are clear.    She talked and talked non-stop and repeated her stories.  She makes up grand stories.  Easier to agree with her than to contradict her.    ASSESSMENT/PLAN:  1. " Cerebrovascular accident (CVA), unspecified mechanism (H)    2. Anxiety    3. Paranoia (H)      - feel that with her CVA, this probably changed her outlook and behavior.  She seemed to understand that this NP wanted to increase her medication in order to hopefully calm her down and less paranoid.  Staff also asked if her Remeron could be discontinued due to refusing to take the medication.    Staff set up her medication and she gives herself.      ORDERS:  1. discontinue Remeron due to refusal  2.  Increase Seroquel to 37.5mg BID from 25mg BID.  Would like her to have it 3x day but feel compliance would be an issue.  Will add a half of dose to each pill and continue to monitor response.      Electronically signed by:  Brie Newberry RN GNP      Sincerely,        RONY Patino CNP

## 2018-10-03 NOTE — MR AVS SNAPSHOT
After Visit Summary   10/3/2018    Sintia Connors    MRN: [id number]           Patient Information     Date Of Birth          1935        Visit Information        Provider Department      10/3/2018 8:00 AM Brie Newberry APRN CNP [facility]        Today's Diagnoses     Cerebrovascular accident (CVA), unspecified mechanism (H)    -  1    Anxiety        Paranoia (H)           Follow-ups after your visit        Who to contact     If you have questions or need follow up information about today's clinic visit or your schedule please contact [facility] directly at [phone].  Normal or non-critical lab and imaging results will be communicated to you by Pretio Interactivehart, letter or phone within 4 business days after the clinic has received the results. If you do not hear from us within 7 days, please contact the clinic through Pretio Interactivehart or phone. If you have a critical or abnormal lab result, we will notify you by phone as soon as possible.  Submit refill requests through Oberon Media or call your pharmacy and they will forward the refill request to us. Please allow 3 business days for your refill to be completed.          Additional Information About Your Visit        MyChart Information     Ober[employer] Media gives you secure access to your electronic health record. If you see a primary care provider, you can also send messages to your care team and make appointments. If you have questions, please call your primary care clinic.  If you do not have a primary care provider, please call [phone] and they will assist you.        Care EveryWhere ID     This is your Care EveryWhere ID. This could be used by other organizations to access your [facility] medical records  [id number]         Blood Pressure from Last 3 Encounters:   09/25/18 142/76   09/18/18 134/66   07/29/18 109/58    Weight from Last 3 Encounters:   11/10/17 164 lb (74.4 kg)   08/01/17 164 lb (74.4 kg)   06/20/17 164 lb (74.4 kg)               Today, you had the following     No orders found for display         Today's Medication Changes          These changes are accurate as of 10/3/18 11:59 PM.  If you have any questions, ask your nurse or doctor.               These medicines have changed or have updated prescriptions.        Dose/Directions    QUEtiapine 25 MG tablet   Commonly known as:  SEROquel   This may have changed:  additional instructions   Used for:  Anxiety        TAKE 1.5 tabs (37.5) by mouth twice a day   Quantity:  180 tablet   Refills:  3            Where to get your medicines      These medications were sent to [facility] [address]  [address] [address]     Phone:  [phone]     QUEtiapine 25 MG tablet                Primary Care Provider Office Phone # Fax #    Brie RONY Schmidt [phone]       [address] [place] [address]        Equal Access to Services     [facility]: Hadii felipe maharaj hadasho Soomaali, waaxda luqadaha, qaybta kaalmada adeegyada, waxay jonathan hayvictor manuel villarreal . So [facility] [phone].    ATENCIÓN: Si habla español, tiene a agarwal disposición servicios gratuitos de asistencia lingüística. Chuy al [phone].    We comply with applicable federal civil rights laws and [place] laws. We do not discriminate on the basis of race, color, national origin, age, disability, sex, sexual orientation, or gender identity.            Thank you!     Thank you for choosing [facility]  for your care. Our goal is always to provide you with excellent care. Hearing back from our patients is one way we can continue to improve our services. Please take a few minutes to complete the written survey that you may receive in the mail after your visit with us. Thank you!             Your Updated Medication List - Protect others around you: Learn how to safely use, store and throw away your medicines at www.disposemymeds.org.           This list is accurate as of 10/3/18 11:59 PM.  Always use your most recent med list.                   Brand Name Dispense Instructions for use Diagnosis    ASPIRIN PO      Take 81 mg by mouth daily        calcium carbonate 500 MG chewable tablet    TUMS    90 tablet    Take 1 chew tab by mouth every 8 hours as needed for heartburn        diclofenac 1 % Gel topical gel    VOLTAREN    100 g    APPLY 4 GRAMS TO KNEES OR 2 GRAMS TO HANDS FOUR TIMES DAILY USING ENCLOSED DOSING CARD.    Other secondary osteoarthritis of knee       esomeprazole 40 MG CR capsule    nexIUM    30 capsule    Take 1 capsule (40 mg) by mouth every morning (before breakfast) Take 30-60 minutes before a eating.    Gastroesophageal reflux disease with esophagitis, Abdominal pain, epigastric       glucosamine-chondroitin 500-400 MG Caps per capsule     270 capsule    Take 1 capsule by mouth 3 times daily    Primary osteoarthritis involving multiple joints       levETIRAcetam 750 MG tablet    KEPPRA    180 tablet    Take 1 tablet (750 mg) by mouth 2 times daily    Seizure (H)       metoprolol tartrate 25 MG tablet    LOPRESSOR    270 tablet    TAKE 1 AND 1/2 TABLETS(37.5 MG) BY MOUTH TWICE DAILY    Paroxysmal atrial fibrillation (H)       Multi-vitamin Tabs tablet      Take 1 tablet by mouth daily.        order for DME     1 Device    Equipment being ordered: transfer bath bench    Osteoarthritis       order for DME     1 Device    Equipment being ordered: Bath transfer bench Fax to:[phone]    Osteoarthritis       order for DME     1 each    Equipment being ordered: Shower Chair    Osteoarthritis of knee       QUEtiapine 25 MG tablet    SEROquel    180 tablet    TAKE 1.5 tabs (37.5) by mouth twice a day    Anxiety       TYLENOL 500 MG tablet   Generic drug:  acetaminophen      Take 500 mg by mouth every 8 hours as needed for mild pain        VITAMIN D (CHOLECALCIFEROL) PO      Take 2,000 Units by mouth daily

## 2018-10-30 ENCOUNTER — ASSISTED LIVING VISIT (OUTPATIENT)
Dept: GERIATRICS | Facility: CLINIC | Age: 83
End: 2018-10-30
Payer: MEDICARE

## 2018-10-30 DIAGNOSIS — F32.A DEPRESSION, UNSPECIFIED DEPRESSION TYPE: ICD-10-CM

## 2018-10-30 DIAGNOSIS — F22 PARANOIA (H): Primary | ICD-10-CM

## 2018-10-30 NOTE — MR AVS SNAPSHOT
After Visit Summary   10/30/2018    Sintia Connors    MRN: [id number]           Patient Information     Date Of Birth          1935        Visit Information        Provider Department      10/30/2018 8:30 AM Brie Newberry APRN CNP [facility]        Today's Diagnoses     Paranoia (H)    -  1    Depression, unspecified depression type           Follow-ups after your visit        Who to contact     If you have questions or need follow up information about today's clinic visit or your schedule please contact [facility] directly at [phone].  Normal or non-critical lab and imaging results will be communicated to you by [employer], letter or phone within 4 business days after the clinic has received the results. If you do not hear from us within 7 days, please contact the clinic through [employer] or phone. If you have a critical or abnormal lab result, we will notify you by phone as soon as possible.  Submit refill requests through [employer] or call your pharmacy and they will forward the refill request to us. Please allow 3 business days for your refill to be completed.          Additional Information About Your Visit        MyChart Information     [employer] gives you secure access to your electronic health record. If you see a primary care provider, you can also send messages to your care team and make appointments. If you have questions, please call your primary care clinic.  If you do not have a primary care provider, please call [phone] and they will assist you.        Care EveryWhere ID     This is your Care EveryWhere ID. This could be used by other organizations to access your [place] medical records  [id number]         Blood Pressure from Last 3 Encounters:   09/25/18 142/76   09/18/18 134/66   07/29/18 109/58    Weight from Last 3 Encounters:   11/10/17 164 lb (74.4 kg)   08/01/17 164 lb (74.4 kg)   06/20/17 164 lb (74.4 kg)              Today, you had the  following     No orders found for display         Today's Medication Changes          These changes are accurate as of 10/30/18 11:59 PM.  If you have any questions, ask your nurse or doctor.               These medicines have changed or have updated prescriptions.        Dose/Directions    SEROQUEL PO   This may have changed:  Another medication with the same name was removed. Continue taking this medication, and follow the directions you see here.        Dose:  50 mg   Take 50 mg by mouth 2 times daily   Refills:  0                Primary Care Provider Office Phone # Fax #    Brie Manuel Nataliebaronflakito RONY [phone] [address] [place] [address]        Equal Access to Services     [facility]: Hadii felipe maharaj haddylano Mara, waaxda lumiloadaha, qaybta kaalmada shaggy, morgan villarreal . So [facility] [phone].    ATENCIÓN: Si habla español, tiene a agarwal disposición servicios gratuitos de asistencia lingüística. Llame al [phone].    We comply with applicable federal civil rights laws and [place] laws. We do not discriminate on the basis of race, color, national origin, age, disability, sex, sexual orientation, or gender identity.            Thank you!     Thank you for choosing [facility]  for your care. Our goal is always to provide you with excellent care. Hearing back from our patients is one way we can continue to improve our services. Please take a few minutes to complete the written survey that you may receive in the mail after your visit with us. Thank you!             Your Updated Medication List - Protect others around you: Learn how to safely use, store and throw away your medicines at www.disposemymeds.org.          This list is accurate as of 10/30/18 11:59 PM.  Always use your most recent med list.                   Brand Name Dispense Instructions for use Diagnosis    ASPIRIN PO      Take 81 mg by mouth daily        calcium carbonate  500 MG chewable tablet    TUMS    90 tablet    Take 1 chew tab by mouth every 8 hours as needed for heartburn        diclofenac 1 % Gel topical gel    VOLTAREN    100 g    APPLY 4 GRAMS TO KNEES OR 2 GRAMS TO HANDS FOUR TIMES DAILY USING ENCLOSED DOSING CARD.    Other secondary osteoarthritis of knee       esomeprazole 40 MG CR capsule    nexIUM    30 capsule    Take 1 capsule (40 mg) by mouth every morning (before breakfast) Take 30-60 minutes before a eating.    Gastroesophageal reflux disease with esophagitis, Abdominal pain, epigastric       levETIRAcetam 750 MG tablet    KEPPRA    180 tablet    Take 1 tablet (750 mg) by mouth 2 times daily    Seizure (H)       Multi-vitamin Tabs tablet      Take 1 tablet by mouth daily.        order for DME     1 Device    Equipment being ordered: transfer bath bench    Osteoarthritis       order for DME     1 Device    Equipment being ordered: Bath transfer bench Fax to:[phone]    Osteoarthritis       order for DME     1 each    Equipment being ordered: Shower Chair    Osteoarthritis of knee       SEROQUEL PO      Take 50 mg by mouth 2 times daily        TYLENOL 500 MG tablet   Generic drug:  acetaminophen      Take 500 mg by mouth every 8 hours as needed for mild pain        VITAMIN D (CHOLECALCIFEROL) PO      Take 2,000 Units by mouth daily

## 2018-10-30 NOTE — PROGRESS NOTES
[place] [facility]    Chief Complaint   Patient presents with     Depression       [place] Medical Record Number:  [id number]  Place of Service where encounter took place:  [facility] ([facility] [[id number]]    HPI:    Sintia Connors is a 83 year old  (1935), who is being seen today for an episodic care visit.  HPI information obtained from: facility staff and patient report. Today's concern is:    1. Paranoia (H)    2. Depression, unspecified depression type      Today as this NP was coming off the elevator on the floor that Sintia resides on, two nurses where trying to get on quickly due to Sintia's outburst.  Today is the day she gets her med obx changed out and so another nurse went along with hopes of keeping the visit on a timely manor.  Instead, Sintia became threatening with wanting to burn the apartment down as well as negative comments towards staff.  As they left her apartment she came to the door and starting yelling at them.  They questioned if she was coming after them.  About 15 minutes later, Sintia calls the nursing office and tells the  to tell the nurse she was sorry for her actions.  Sintia will do this and staff feels she knows exactly what she does in her actions.  Not the first time she has called down to apologize.    After lunch this NP escorted the nurse and  up to the apartment to talk to sintia and let her know her actions are not desired and the next time she is threatening, staff will call the police and take her to the ED and then goal would be bhavin psych.      Sintia opened the door and almost closed it again when she saw the three of us.  Had an excuse that she could not talk but then let us come in.  She sat down and the three of us stood.     Very kindly explained to her how her behaviors are interpreted as threatening at times and if staff do not feel safe or feel she is safe, the will call the police and have her to the ER and pursue bhavin  "psych.  Sintia's respond was she shamed herself by rubbing her two index fingers together.  \"go  the corner Sintia and drink milk\".  Got the sense she may have had to do that when she was small.    Voice escalated at times and then came back down.  Sintia will twist conversations around.  In the end, nurse gave her her new set up of pills and labeled the boxes so she knows what day to take which one.  Question if she does not take her medications like she should.       REVIEW OF SYSTEMS:  4 point ROS including Respiratory, CV, GI and , other than that noted in the HPI,  is negative    There were no vitals taken for this visit.  GENERAL APPEARANCE:  Alert, in no distress, was nervous at first to talk and think she knew what was going to be said seeing her primary NP.    Ambulating with her walker.  After 10 minutes kindly excused ourselves and reminded her that now is out to get her but she is very paranoid as well as being suspicious of other races.  Work force is diverse at [employer].        ASSESSMENT/PLAN:  1. Paranoia (H)    2. Depression, unspecified depression type      - currently on Seroquel 37.5mg BID.   Discussed with staff to increase to 50mg BID.  She seemed to do well with the increase to 37.5mg BID and no acute behaviors until today.  Will also note that this NP had called Sintia last week to ask if she will take the flu vaccine.  She kindly refused the vaccine.     1.  Increase Seroquel to 50 mg po BID at next med change.  Dx: paranoia w/major depression         Electronically signed by:  RONY Patino CNP  "

## 2018-10-30 NOTE — LETTER
10/30/2018        RE: Sintia Connors  [facility]  [address] [address]        [place] [facility]    Chief Complaint   Patient presents with     Depression       [place] Medical Record Number:  [id number]  Place of Service where encounter took place:  [facility] ([facility] [[id number]]    HPI:    Sintia Connors is a 83 year old  (1935), who is being seen today for an episodic care visit.  HPI information obtained from: facility staff and patient report. Today's concern is:    1. Paranoia (H)    2. Depression, unspecified depression type      Today as this NP was coming off the elevator on the floor that Sintia resides on, two nurses where trying to get on quickly due to Sintia's outburst.  Today is the day she gets her med obx changed out and so another nurse went along with hopes of keeping the visit on a timely manor.  Instead, Sintia became threatening with wanting to burn the apartment down as well as negative comments towards staff.  As they left her apartment she came to the door and starting yelling at them.  They questioned if she was coming after them.  About 15 minutes later, Sintia calls the nursing office and tells the  to tell the nurse she was sorry for her actions.  Sintia will do this and staff feels she knows exactly what she does in her actions.  Not the first time she has called down to apologize.    After lunch this NP escorted the nurse and  up to the apartment to talk to sintia and let her know her actions are not desired and the next time she is threatening, staff will call the police and take her to the ED and then goal would be bhavin psych.      Sintia opened the door and almost closed it again when she saw the three of us.  Had an excuse that she could not talk but then let us come in.  She sat down and the three of us stood.     Very kindly explained to her how her behaviors are interpreted as threatening at times and  "if staff do not feel safe or feel she is safe, the will call the police and have her to the ER and pursue bhavin psych.  Sintia's respond was she shamed herself by rubbing her two index fingers together.  \"go  the corner Sintia and drink milk\".  Got the sense she may have had to do that when she was small.    Voice escalated at times and then came back down.  Sintia will twist conversations around.  In the end, nurse gave her her new set up of pills and labeled the boxes so she knows what day to take which one.  Question if she does not take her medications like she should.       REVIEW OF SYSTEMS:  4 point ROS including Respiratory, CV, GI and , other than that noted in the HPI,  is negative    There were no vitals taken for this visit.  GENERAL APPEARANCE:  Alert, in no distress, was nervous at first to talk and think she knew what was going to be said seeing her primary NP.    Ambulating with her walker.  After 10 minutes kindly excused ourselves and reminded her that now is out to get her but she is very paranoid as well as being suspicious of other races.  Work force is diverse at [employer].        ASSESSMENT/PLAN:  1. Paranoia (H)    2. Depression, unspecified depression type      - currently on Seroquel 37.5mg BID.   Discussed with staff to increase to 50mg BID.  She seemed to do well with the increase to 37.5mg BID and no acute behaviors until today.  Will also note that this NP had called Sintia last week to ask if she will take the flu vaccine.  She kindly refused the vaccine.     1.  Increase Seroquel to 50 mg po BID at next med change.  Dx: paranoia w/major depression         Electronically signed by:  RONY Patino CNP      Sincerely,        RONY Patino CNP    "

## 2018-11-02 DIAGNOSIS — M15.0 PRIMARY OSTEOARTHRITIS INVOLVING MULTIPLE JOINTS: ICD-10-CM

## 2018-11-02 DIAGNOSIS — I48.0 PAROXYSMAL ATRIAL FIBRILLATION (H): ICD-10-CM

## 2018-11-02 RX ORDER — METOPROLOL TARTRATE 25 MG/1
37.5 TABLET, FILM COATED ORAL 2 TIMES DAILY
Qty: 270 TABLET | Refills: 3 | Status: ON HOLD | OUTPATIENT
Start: 2018-11-02 | End: 2019-01-01

## 2018-11-02 RX ORDER — SODIUM PHOSPHATE,MONO-DIBASIC 19G-7G/118
1 ENEMA (ML) RECTAL DAILY
Qty: 270 CAPSULE | Refills: 3 | Status: ON HOLD | OUTPATIENT
Start: 2018-11-02 | End: 2019-01-01

## 2018-11-05 DIAGNOSIS — R10.13 ABDOMINAL PAIN, EPIGASTRIC: ICD-10-CM

## 2018-11-05 DIAGNOSIS — K21.00 GASTROESOPHAGEAL REFLUX DISEASE WITH ESOPHAGITIS: ICD-10-CM

## 2018-11-05 NOTE — TELEPHONE ENCOUNTER
"esomeprazole (NEXIUM) 40 MG CR capsule  Last Written Prescription Date:  11/22/17  Last Fill Quantity: 30,  # refills: 11   Last office visit: 6/11/2018 with prescribing provider:  Coni   Future Office Visit:        Requested Prescriptions   Pending Prescriptions Disp Refills     esomeprazole (NEXIUM) 40 MG CR capsule [Pharmacy Med Name: ESOMEPRAZOLE MAG DR 40 MG CAP] 30 capsule 2     Sig: TAKE 1 CAPSULE BY MOUTH EVERY MORNING 30-60 MINUTES BEFORE BREAKFAST    PPI Protocol Failed    11/5/2018 11:47 AM       Failed - No diagnosis of osteoporosis on record       Passed - Not on Clopidogrel (unless Pantoprazole ordered)       Passed - Recent (12 mo) or future (30 days) visit within the authorizing provider's specialty    Patient had office visit in the last 12 months or has a visit in the next 30 days with authorizing provider or within the authorizing provider's specialty.  See \"Patient Info\" tab in inbasket, or \"Choose Columns\" in Meds & Orders section of the refill encounter.             Passed - Patient is age 18 or older       Passed - No active pregnacy on record       Passed - No positive pregnancy test in past 12 months          "

## 2018-11-06 RX ORDER — MECOBALAMIN 5000 MCG
15 TABLET,DISINTEGRATING ORAL DAILY
Qty: 30 CAPSULE | Refills: 3 | Status: SHIPPED | OUTPATIENT
Start: 2018-11-06 | End: 2018-01-01

## 2018-11-06 RX ORDER — ESOMEPRAZOLE MAGNESIUM 40 MG/1
CAPSULE, DELAYED RELEASE ORAL
Qty: 30 CAPSULE | Refills: 2 | Status: CANCELLED | OUTPATIENT
Start: 2018-11-06

## 2018-12-13 NOTE — PROGRESS NOTES
"[place] [facility]    Chief Complaint   Patient presents with     TIA       [place] Medical Record Number:  [id number]  Place of Service where encounter took place:  [facility] ([facility] [[id number]]    HPI:    Sintia Connors is a 83 year old  (1935), who is being seen today for an episodic care visit.  HPI information obtained from: facility chart records, facility staff and patient report. Today's concern is:    1. Paroxysmal atrial fibrillation (H)    2. Seizure (H)    3. 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 Paranoia (H)    5. Anxiety      Went to see Sintia today with another staff member present due to her behaviors the last visit.  This NP had gone with 2 other staff to deliver her pills to let her know her behaviors are not desired and if she states something to a staff, burn down her apartment, and they feel threatened, they may end up calling the police and she would end up at the emergency room and possibly sent to geriatric psych program.  Well from that visit, she told her son that this NP said \"I have many ways of getting rid of you\" (along those lines) and did not want to see this NP every again.  DON let this NP know and they were going to look for another provider to see her in the building.  In the meantime, this NP would not see her but monitor from afar.    [facility] now is working with her as a house psychologist sees her every other week.  Their relationship has gone well.  A week or so ago, staff stated Sintia told them \"I forgive her in my heart\".  No other provider found.  Due to other behaviors, staff mainly go in twos to see her to limit the time talking to her with her paranoia as well as stories she fabricates are not true.      Came today with arrangement with staff to go in to see Sintia and make sure she is doing ok and if anything needed.  Staff called her on the phone to ask if she was there and if able to come up.  Could hear her " "on the speaker not saying so many nice things.  Figured she seemed nervous to see this NP again as much as this NP is to see her.  Sintia can be very negative \"I will tell her where to shove it\"  Went to her apartment with another staff and did not say to much to her but listened to her talk fast.  She had no health complaints.  Allowed a B/P and to listen to lungs, heart and abdomen.  Sintia did make a comment about the last encounter and this NP stated the truth of her behavior that day.  Nothing horrible and her response was \"You must be absent minded\".      REVIEW OF SYSTEMS:  4 point ROS including Respiratory, CV, GI and , other than that noted in the HPI,  is negative    /84   GENERAL APPEARANCE:  Alert, in no distress, talks fast and loud.  Was combing her hair and about to put face cream on as her face was flaky in some parts.  No redness.  Heart rate regular and strong.  S1 and S2 heard.  No fluttering.  Lungs clear with good expansion.  No use of oxygen.  Able to use all extremities.  No weakness but uses a walker for ambulation.    Abdomen is round and soft.  Non-tender to touch.  No edema in lower legs.    Current Outpatient Medications   Medication Sig Dispense Refill     acetaminophen (TYLENOL) 500 MG tablet Take 500 mg by mouth every 8 hours as needed for mild pain       ASPIRIN PO Take 81 mg by mouth daily       calcium carbonate (TUMS) 500 MG chewable tablet Take 1 chew tab by mouth every 8 hours as needed for heartburn  90 tablet      diclofenac (VOLTAREN) 1 % GEL APPLY 4 GRAMS TO KNEES OR 2 GRAMS TO HANDS FOUR TIMES DAILY USING ENCLOSED DOSING CARD. 100 g 3     glucosamine-chondroitin 500-400 MG CAPS per capsule Take 1 capsule by mouth daily 270 capsule 3     LANsoprazole (PREVACID) 15 MG CR capsule Take 1 capsule (15 mg) by mouth daily 30 capsule 3     levETIRAcetam (KEPPRA) 750 MG tablet Take 1 tablet (750 mg) by mouth 2 times daily 180 tablet 3     metoprolol tartrate (LOPRESSOR) 25 MG " "tablet Take 1.5 tablets (37.5 mg) by mouth 2 times daily 270 tablet 3     multivitamin, therapeutic with minerals (MULTI-VITAMIN) TABS Take 1 tablet by mouth daily.       ORDER FOR DME Equipment being ordered: Shower Chair 1 each 0     ORDER FOR DME Equipment being ordered: Bath transfer bench  Fax to:[phone] 1 Device 0     ORDER FOR DME Equipment being ordered: transfer bath bench 1 Device 0     QUEtiapine Fumarate (SEROQUEL PO) Take 50 mg by mouth 2 times daily       VITAMIN D, CHOLECALCIFEROL, PO Take 2,000 Units by mouth daily         ASSESSMENT/PLAN:  1. Paroxysmal atrial fibrillation (H)    2. Seizure (H)    3. 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 Paranoia (H)    5. Anxiety      - Sintia is stable physically and is seeing a psychologist which is helpful.  Staff have questioned if she takes her medications correctly.  Would be ideal if staff could give her the medications and watch her take them but that most likely would not happen.  No new orders today.    Thanked Sintia for allowing a visit.  She thanked \"this nurse\" but was more \"flowery\" with her goodbye to the staff with this NP.  Kept it strictly to making sure her psychical well being was good.  Hoping this will help re-establish relationship with Sintia.        Electronically signed by:  RONY Patino CNP    "

## 2018-12-13 NOTE — LETTER
"    12/13/2018        RE: Sintia Connors  [facility]  [address] [address]        [place] [facility]    Chief Complaint   Patient presents with     TIA       [place] Medical Record Number:  [id number]  Place of Service where encounter took place:  [facility] [[id number]]    HPI:    Sintia Connors is a 83 year old  (1935), who is being seen today for an episodic care visit.  HPI information obtained from: facility chart records, facility staff and patient report. Today's concern is:    1. Paroxysmal atrial fibrillation (H)    2. Seizure (H)    3. 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 Paranoia (H)    5. Anxiety      Went to see Sintia today with another staff member present due to her behaviors the last visit.  This NP had gone with 2 other staff to deliver her pills to let her know her behaviors are not desired and if she states something to a staff, burn down her apartment, and they feel threatened, they may end up calling the police and she would end up at the emergency room and possibly sent to geriatric psych program.  Well from that visit, she told her son that this NP said \"I have many ways of getting rid of you\" (along those lines) and did not want to see this NP every again.  DON let this NP know and they were going to look for another provider to see her in the building.  In the meantime, this NP would not see her but monitor from afar.    [facility] now is working with her as a house psychologist sees her every other week.  Their relationship has gone well.  A week or so ago, staff stated Sintia told them \"I forgive her in my heart\".  No other provider found.  Due to other behaviors, staff mainly go in twos to see her to limit the time talking to her with her paranoia as well as stories she fabricates are not true.      Came today with arrangement with staff to go in to see Sintia and make sure she is doing ok and " "if anything needed.  Staff called her on the phone to ask if she was there and if able to come up.  Could hear her on the speaker not saying so many nice things.  Figured she seemed nervous to see this NP again as much as this NP is to see her.  Sintia can be very negative \"I will tell her where to shove it\"  Went to her apartment with another staff and did not say to much to her but listened to her talk fast.  She had no health complaints.  Allowed a B/P and to listen to lungs, heart and abdomen.  Sintia did make a comment about the last encounter and this NP stated the truth of her behavior that day.  Nothing horrible and her response was \"You must be absent minded\".      REVIEW OF SYSTEMS:  4 point ROS including Respiratory, CV, GI and , other than that noted in the HPI,  is negative    /82   GENERAL APPEARANCE:  Alert, in no distress, talks fast and loud.  Was combing her hair and about to put face cream on as her face was flaky in some parts.  No redness.  Heart rate regular and strong.  S1 and S2 heard.  No fluttering.  Lungs clear with good expansion.  No use of oxygen.  Able to use all extremities.  No weakness but uses a walker for ambulation.    Abdomen is round and soft.  Non-tender to touch.  No edema in lower legs.    Current Outpatient Medications   Medication Sig Dispense Refill     acetaminophen (TYLENOL) 500 MG tablet Take 500 mg by mouth every 8 hours as needed for mild pain       ASPIRIN PO Take 81 mg by mouth daily       calcium carbonate (TUMS) 500 MG chewable tablet Take 1 chew tab by mouth every 8 hours as needed for heartburn  90 tablet      diclofenac (VOLTAREN) 1 % GEL APPLY 4 GRAMS TO KNEES OR 2 GRAMS TO HANDS FOUR TIMES DAILY USING ENCLOSED DOSING CARD. 100 g 3     glucosamine-chondroitin 500-400 MG CAPS per capsule Take 1 capsule by mouth daily 270 capsule 3     LANsoprazole (PREVACID) 15 MG CR capsule Take 1 capsule (15 mg) by mouth daily 30 capsule 3     levETIRAcetam (KEPPRA) 750 " "MG tablet Take 1 tablet (750 mg) by mouth 2 times daily 180 tablet 3     metoprolol tartrate (LOPRESSOR) 25 MG tablet Take 1.5 tablets (37.5 mg) by mouth 2 times daily 270 tablet 3     multivitamin, therapeutic with minerals (MULTI-VITAMIN) TABS Take 1 tablet by mouth daily.       ORDER FOR DME Equipment being ordered: Shower Chair 1 each 0     ORDER FOR DME Equipment being ordered: Bath transfer bench  Fax to:[phone] Device 0     ORDER FOR DME Equipment being ordered: transfer bath bench 1 Device 0     QUEtiapine Fumarate (SEROQUEL PO) Take 50 mg by mouth 2 times daily       VITAMIN D, CHOLECALCIFEROL, PO Take 2,000 Units by mouth daily         ASSESSMENT/PLAN:  1. Paroxysmal atrial fibrillation (H)    2. Seizure (H)    3. 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 Paranoia (H)    5. Anxiety      - Sintia is stable physically and is seeing a psychologist which is helpful.  Staff have questioned if she takes her medications correctly.  Would be ideal if staff could give her the medications and watch her take them but that most likely would not happen.  No new orders today.    Thanked Sintia for allowing a visit.  She thanked \"this nurse\" but was more \"flowery\" with her goodbye to the staff with this NP.  Kept it strictly to making sure her psychical well being was good.  Hoping this will help re-establish relationship with Sintia.        Electronically signed by:  RONY Patino CNP        Sincerely,        RONY Patino CNP    "

## 2018-12-17 NOTE — TELEPHONE ENCOUNTER
PA Initiation    Medication: Lansoprazole 15 mg DR cap  Insurance Company: [employer] - Phone [phone] Fax [phone]  Pharmacy Filling the Rx: [place], [place] - [address] [facility]  Filling Pharmacy Phone: [phone]  Filling Pharmacy Fax:    Start Date: 12/17/2018

## 2018-12-20 NOTE — TELEPHONE ENCOUNTER
PRIOR AUTHORIZATION DENIED    Medication: Lansoprazole 15mg denied     Denial Date: 12/17/2018    Denial Rational: Patients supplement plan does not cover OTC medications        Appeal Information: This cannot be appealed, OTC medications are not covered under this patient's plan

## 2018-12-27 NOTE — LETTER
"    12/27/2018        RE: Sintia Connors  [facility]  [address] [address]        [place] [facility]    Chief Complaint   Patient presents with     ANDREWSARITA       [place] Medical Record Number:  [id number]  Place of Service where encounter took place:  [facility] [[id number]]    HPI:    Sintia Connors is a 83 year old  (1935), who is being seen today for an episodic care visit.  HPI information obtained from: facility staff and patient report. Today's concern is:    1. Vomiting without nausea, intractability of vomiting not specified, unspecified vomiting type    2. Diarrhea, unspecified type    3. Paranoia (H)      This NP was in the office and heard nursing on the phone [place]  She had called him and said she had vomited 15 times and then diarrhea.  Son knows his mother can have grand ideas but wanted to make sure she was oK.  Staff told the son this NP was in the building and would go see her.  This is first time this NP has gone alone to see Sintia since she did not want this NP around.  Sintia answered the door, was guarded at first and then \"Hi Cristina, how are you?\"  She did invite this NP in.  Briefly commented on the last encounter and \"water under the bridge\".    Sintia explained that she had seen the dentist but shortly after that she started to vomit 15x and then diarrhea started.  She stated she called out in the hallway for help and no one responded.  She called 911 17x but no one answered.    Last Sunday was the last vomit.  No vomiting or diarrhea today.    REVIEW OF SYSTEMS:  4 point ROS including Respiratory, CV, GI and , other than that noted in the HPI,  is negative    /74   Pulse 87   SpO2 92%   GENERAL APPEARANCE:  Alert, in no distress  Ambulates with her walker.    Skin is pink, dry and warm to touch.    Heart rate regular and strong.  Lungs are clear.  No edema.    ASSESSMENT/PLAN:  1. " Vomiting without nausea, intractability of vomiting not specified, unspecified vomiting type    2. Diarrhea, unspecified type    3. Paranoia (H)      - discussed with Sintia that she may have had the stomach flu that had to run its course.  Since it happened over the weekend, then nursing not here but HHAs are.  Nursing was not aware of any trouble.    Assured her she will be fine.  Stated her medications are going well.    Staff updated and will speak with the son to let him know she was seen and doing fine today.  Encouraged Sintia to keep drinking fluids for hydration.    No new orders today.      Electronically signed by:  RONY Patino CNP        Sincerely,        RONY Patino CNP

## 2018-12-27 NOTE — PROGRESS NOTES
"[place] [facility]    Chief Complaint   Patient presents with     TIA       [place] Medical Record Number:  [id number]  Place of Service where encounter took place:  [facility] ([facility] [[id number]]    HPI:    Sintia Connors is a 83 year old  (1935), who is being seen today for an episodic care visit.  HPI information obtained from: facility staff and patient report. Today's concern is:    1. Vomiting without nausea, intractability of vomiting not specified, unspecified vomiting type    2. Diarrhea, unspecified type    3. Paranoia (H)      This NP was in the office and heard nursing on the phone with Sintia's [place].  She had called him and said she had vomited 15 times and then diarrhea.  Son knows his mother can have grand ideas but wanted to make sure she was oK.  Staff told the son this NP was in the building and would go see her.  This is first time this NP has gone alone to see Sintia since she did not want this NP around.  Sintia answered the door, was guarded at first and then \"Hi Cristina, how are you?\"  She did invite this NP in.  Briefly commented on the last encounter and \"water under the bridge\".    Sintia explained that she had seen the dentist but shortly after that she started to vomit 15x and then diarrhea started.  She stated she called out in the hallway for help and no one responded.  She called 911 17x but no one answered.    Last Sunday was the last vomit.  No vomiting or diarrhea today.    REVIEW OF SYSTEMS:  4 point ROS including Respiratory, CV, GI and , other than that noted in the HPI,  is negative    /74   Pulse 87   SpO2 92%   GENERAL APPEARANCE:  Alert, in no distress  Ambulates with her walker.    Skin is pink, dry and warm to touch.    Heart rate regular and strong.  Lungs are clear.  No edema.    ASSESSMENT/PLAN:  1. Vomiting without nausea, intractability of vomiting not specified, unspecified vomiting type    2. Diarrhea, " unspecified type    3. Paranoia (H)      - discussed with Sintia that she may have had the stomach flu that had to run its course.  Since it happened over the weekend, then nursing not here but HHAs are.  Nursing was not aware of any trouble.    Assured her she will be fine.  Stated her medications are going well.    Staff updated and will speak with the son to let him know she was seen and doing fine today.  Encouraged Sintia to keep drinking fluids for hydration.    No new orders today.      Electronically signed by:  RONY Patino CNP

## 2019-01-01 ENCOUNTER — ASSISTED LIVING VISIT (OUTPATIENT)
Dept: GERIATRICS | Facility: CLINIC | Age: 84
End: 2019-01-01
Payer: MEDICARE

## 2019-01-01 ENCOUNTER — APPOINTMENT (OUTPATIENT)
Dept: INTERVENTIONAL RADIOLOGY/VASCULAR | Facility: CLINIC | Age: 84
DRG: 853 | End: 2019-01-01
Attending: INTERNAL MEDICINE
Payer: MEDICARE

## 2019-01-01 ENCOUNTER — APPOINTMENT (OUTPATIENT)
Dept: GENERAL RADIOLOGY | Facility: CLINIC | Age: 84
DRG: 853 | End: 2019-01-01
Attending: HOSPITALIST
Payer: MEDICARE

## 2019-01-01 ENCOUNTER — NURSING HOME VISIT (OUTPATIENT)
Dept: GERIATRICS | Facility: CLINIC | Age: 84
End: 2019-01-01
Payer: MEDICARE

## 2019-01-01 ENCOUNTER — DOCUMENTATION ONLY (OUTPATIENT)
Dept: OTHER | Facility: CLINIC | Age: 84
End: 2019-01-01

## 2019-01-01 ENCOUNTER — APPOINTMENT (OUTPATIENT)
Dept: PHYSICAL THERAPY | Facility: CLINIC | Age: 84
DRG: 853 | End: 2019-01-01
Attending: PHYSICIAN ASSISTANT
Payer: MEDICARE

## 2019-01-01 ENCOUNTER — APPOINTMENT (OUTPATIENT)
Dept: CARDIOLOGY | Facility: CLINIC | Age: 84
DRG: 853 | End: 2019-01-01
Attending: NURSE PRACTITIONER
Payer: MEDICARE

## 2019-01-01 ENCOUNTER — APPOINTMENT (OUTPATIENT)
Dept: GENERAL RADIOLOGY | Facility: CLINIC | Age: 84
DRG: 853 | End: 2019-01-01
Attending: INTERNAL MEDICINE
Payer: MEDICARE

## 2019-01-01 ENCOUNTER — APPOINTMENT (OUTPATIENT)
Dept: GENERAL RADIOLOGY | Facility: CLINIC | Age: 84
DRG: 853 | End: 2019-01-01
Attending: EMERGENCY MEDICINE
Payer: MEDICARE

## 2019-01-01 ENCOUNTER — APPOINTMENT (OUTPATIENT)
Dept: SPEECH THERAPY | Facility: CLINIC | Age: 84
DRG: 853 | End: 2019-01-01
Attending: HOSPITALIST
Payer: MEDICARE

## 2019-01-01 ENCOUNTER — APPOINTMENT (OUTPATIENT)
Dept: CT IMAGING | Facility: CLINIC | Age: 84
DRG: 853 | End: 2019-01-01
Attending: RADIOLOGY
Payer: MEDICARE

## 2019-01-01 ENCOUNTER — APPOINTMENT (OUTPATIENT)
Dept: GENERAL RADIOLOGY | Facility: CLINIC | Age: 84
DRG: 853 | End: 2019-01-01
Attending: PHYSICIAN ASSISTANT
Payer: MEDICARE

## 2019-01-01 ENCOUNTER — HOSPITAL LABORATORY (OUTPATIENT)
Dept: OTHER | Facility: CLINIC | Age: 84
End: 2019-01-01

## 2019-01-01 ENCOUNTER — APPOINTMENT (OUTPATIENT)
Dept: CT IMAGING | Facility: CLINIC | Age: 84
DRG: 853 | End: 2019-01-01
Attending: HOSPITALIST
Payer: MEDICARE

## 2019-01-01 ENCOUNTER — APPOINTMENT (OUTPATIENT)
Dept: CT IMAGING | Facility: CLINIC | Age: 84
DRG: 853 | End: 2019-01-01
Attending: INTERNAL MEDICINE
Payer: MEDICARE

## 2019-01-01 ENCOUNTER — APPOINTMENT (OUTPATIENT)
Dept: SPEECH THERAPY | Facility: CLINIC | Age: 84
DRG: 853 | End: 2019-01-01
Payer: MEDICARE

## 2019-01-01 ENCOUNTER — APPOINTMENT (OUTPATIENT)
Dept: PHYSICAL THERAPY | Facility: CLINIC | Age: 84
DRG: 853 | End: 2019-01-01
Payer: MEDICARE

## 2019-01-01 ENCOUNTER — HOSPITAL ENCOUNTER (INPATIENT)
Facility: CLINIC | Age: 84
LOS: 12 days | Discharge: HOSPICE | DRG: 853 | End: 2019-11-01
Attending: EMERGENCY MEDICINE | Admitting: HOSPITALIST
Payer: MEDICARE

## 2019-01-01 ENCOUNTER — DISCHARGE SUMMARY NURSING HOME (OUTPATIENT)
Dept: GERIATRICS | Facility: CLINIC | Age: 84
End: 2019-01-01
Payer: MEDICARE

## 2019-01-01 ENCOUNTER — APPOINTMENT (OUTPATIENT)
Dept: ULTRASOUND IMAGING | Facility: CLINIC | Age: 84
DRG: 853 | End: 2019-01-01
Attending: INTERNAL MEDICINE
Payer: MEDICARE

## 2019-01-01 VITALS
BODY MASS INDEX: 43.61 KG/M2 | RESPIRATION RATE: 18 BRPM | SYSTOLIC BLOOD PRESSURE: 118 MMHG | WEIGHT: 237 LBS | TEMPERATURE: 97.8 F | HEART RATE: 90 BPM | DIASTOLIC BLOOD PRESSURE: 84 MMHG | HEIGHT: 62 IN

## 2019-01-01 VITALS
SYSTOLIC BLOOD PRESSURE: 168 MMHG | OXYGEN SATURATION: 90 % | TEMPERATURE: 98 F | DIASTOLIC BLOOD PRESSURE: 147 MMHG | HEART RATE: 98 BPM | RESPIRATION RATE: 18 BRPM

## 2019-01-01 VITALS
BODY MASS INDEX: 45.12 KG/M2 | WEIGHT: 245.2 LBS | TEMPERATURE: 97.6 F | OXYGEN SATURATION: 82 % | DIASTOLIC BLOOD PRESSURE: 63 MMHG | HEART RATE: 89 BPM | RESPIRATION RATE: 14 BRPM | HEIGHT: 62 IN | SYSTOLIC BLOOD PRESSURE: 139 MMHG

## 2019-01-01 VITALS
OXYGEN SATURATION: 96 % | HEART RATE: 84 BPM | TEMPERATURE: 98 F | DIASTOLIC BLOOD PRESSURE: 78 MMHG | SYSTOLIC BLOOD PRESSURE: 124 MMHG

## 2019-01-01 VITALS
DIASTOLIC BLOOD PRESSURE: 80 MMHG | BODY MASS INDEX: 43.1 KG/M2 | WEIGHT: 235.67 LBS | RESPIRATION RATE: 18 BRPM | SYSTOLIC BLOOD PRESSURE: 130 MMHG | HEART RATE: 102 BPM | OXYGEN SATURATION: 96 % | TEMPERATURE: 98.2 F

## 2019-01-01 DIAGNOSIS — R65.20 SEPSIS WITH ACUTE RENAL FAILURE WITHOUT SEPTIC SHOCK, DUE TO UNSPECIFIED ORGANISM, UNSPECIFIED ACUTE RENAL FAILURE TYPE (H): ICD-10-CM

## 2019-01-01 DIAGNOSIS — R56.9 SEIZURES (H): ICD-10-CM

## 2019-01-01 DIAGNOSIS — N73.9 PELVIC ABSCESS IN FEMALE: ICD-10-CM

## 2019-01-01 DIAGNOSIS — F22 PARANOIA (H): ICD-10-CM

## 2019-01-01 DIAGNOSIS — N17.9 SEPSIS WITH ACUTE RENAL FAILURE WITHOUT SEPTIC SHOCK, DUE TO UNSPECIFIED ORGANISM, UNSPECIFIED ACUTE RENAL FAILURE TYPE (H): ICD-10-CM

## 2019-01-01 DIAGNOSIS — M05.79 RHEUMATOID ARTHRITIS INVOLVING MULTIPLE SITES WITH POSITIVE RHEUMATOID FACTOR (H): ICD-10-CM

## 2019-01-01 DIAGNOSIS — R41.89 COGNITIVE DEFICIT WITH IMPAIRED PSYCHOMOTOR FUNCTION: ICD-10-CM

## 2019-01-01 DIAGNOSIS — R56.9 SEIZURE (H): ICD-10-CM

## 2019-01-01 DIAGNOSIS — R56.9 SEIZURES (H): Primary | ICD-10-CM

## 2019-01-01 DIAGNOSIS — R41.843 COGNITIVE DEFICIT WITH IMPAIRED PSYCHOMOTOR FUNCTION: ICD-10-CM

## 2019-01-01 DIAGNOSIS — F01.518 VASCULAR DEMENTIA WITH BEHAVIOR DISTURBANCE (H): ICD-10-CM

## 2019-01-01 DIAGNOSIS — J18.9 PNEUMONIA OF LEFT LOWER LOBE DUE TO INFECTIOUS ORGANISM: ICD-10-CM

## 2019-01-01 DIAGNOSIS — N17.9 ACUTE KIDNEY INJURY (H): ICD-10-CM

## 2019-01-01 DIAGNOSIS — I63.40 CEREBRAL INFARCTION DUE TO EMBOLISM OF CEREBRAL ARTERY (H): ICD-10-CM

## 2019-01-01 DIAGNOSIS — M81.0 SENILE OSTEOPOROSIS: ICD-10-CM

## 2019-01-01 DIAGNOSIS — I68.0 CEREBRAL AMYLOID ANGIOPATHY (CODE): ICD-10-CM

## 2019-01-01 DIAGNOSIS — Z51.5 PALLIATIVE CARE PATIENT: Primary | ICD-10-CM

## 2019-01-01 DIAGNOSIS — I82.403 ACUTE DEEP VEIN THROMBOSIS (DVT) OF BOTH LOWER EXTREMITIES, UNSPECIFIED VEIN (H): ICD-10-CM

## 2019-01-01 DIAGNOSIS — F22 PARANOIA (H): Primary | ICD-10-CM

## 2019-01-01 DIAGNOSIS — I48.0 PAROXYSMAL ATRIAL FIBRILLATION (H): ICD-10-CM

## 2019-01-01 DIAGNOSIS — E53.8 VITAMIN B12 DEFICIENCY WITHOUT ANEMIA: ICD-10-CM

## 2019-01-01 DIAGNOSIS — M15.0 PRIMARY OSTEOARTHRITIS INVOLVING MULTIPLE JOINTS: ICD-10-CM

## 2019-01-01 DIAGNOSIS — I26.99 PULMONARY EMBOLISM, BILATERAL (H): ICD-10-CM

## 2019-01-01 DIAGNOSIS — F51.01 PRIMARY INSOMNIA: ICD-10-CM

## 2019-01-01 DIAGNOSIS — E03.4 HYPOTHYROIDISM DUE TO ACQUIRED ATROPHY OF THYROID: ICD-10-CM

## 2019-01-01 DIAGNOSIS — Z86.73 CEREBROVASCULAR ACCIDENT, OLD: ICD-10-CM

## 2019-01-01 DIAGNOSIS — K21.9 GASTROESOPHAGEAL REFLUX DISEASE WITHOUT ESOPHAGITIS: Primary | ICD-10-CM

## 2019-01-01 DIAGNOSIS — F33.3 SEVERE EPISODE OF RECURRENT MAJOR DEPRESSIVE DISORDER, WITH PSYCHOTIC FEATURES (H): ICD-10-CM

## 2019-01-01 DIAGNOSIS — I50.811 ACUTE RIGHT-SIDED HEART FAILURE (H): ICD-10-CM

## 2019-01-01 DIAGNOSIS — K21.9 GASTROESOPHAGEAL REFLUX DISEASE WITHOUT ESOPHAGITIS: ICD-10-CM

## 2019-01-01 DIAGNOSIS — E53.8 VITAMIN B12 DEFICIENCY WITHOUT ANEMIA: Primary | ICD-10-CM

## 2019-01-01 DIAGNOSIS — J18.9 PNEUMONIA OF LEFT LOWER LOBE DUE TO INFECTIOUS ORGANISM: Primary | ICD-10-CM

## 2019-01-01 DIAGNOSIS — K56.7 ILEUS OF UNSPECIFIED TYPE (H): ICD-10-CM

## 2019-01-01 DIAGNOSIS — Z86.73 HISTORY OF CVA (CEREBROVASCULAR ACCIDENT): ICD-10-CM

## 2019-01-01 DIAGNOSIS — F41.9 ANXIETY: ICD-10-CM

## 2019-01-01 DIAGNOSIS — A41.9 SEPSIS WITH ACUTE RENAL FAILURE WITHOUT SEPTIC SHOCK, DUE TO UNSPECIFIED ORGANISM, UNSPECIFIED ACUTE RENAL FAILURE TYPE (H): ICD-10-CM

## 2019-01-01 DIAGNOSIS — Z51.5 HOSPICE CARE PATIENT: Primary | ICD-10-CM

## 2019-01-01 DIAGNOSIS — I63.40 CEREBRAL INFARCTION DUE TO EMBOLISM OF CEREBRAL ARTERY (H): Primary | ICD-10-CM

## 2019-01-01 DIAGNOSIS — Z51.5 HOSPICE CARE: ICD-10-CM

## 2019-01-01 DIAGNOSIS — I50.33 ACUTE ON CHRONIC DIASTOLIC HEART FAILURE (H): ICD-10-CM

## 2019-01-01 DIAGNOSIS — I10 ESSENTIAL HYPERTENSION: Primary | ICD-10-CM

## 2019-01-01 LAB
ALBUMIN SERPL-MCNC: 2 G/DL (ref 3.4–5)
ALBUMIN SERPL-MCNC: 2.1 G/DL (ref 3.4–5)
ALBUMIN SERPL-MCNC: 2.2 G/DL (ref 3.4–5)
ALBUMIN UR-MCNC: 100 MG/DL
ALP SERPL-CCNC: 101 U/L (ref 40–150)
ALP SERPL-CCNC: 75 U/L (ref 40–150)
ALP SERPL-CCNC: 88 U/L (ref 40–150)
ALT SERPL W P-5'-P-CCNC: 37 U/L (ref 0–50)
ALT SERPL W P-5'-P-CCNC: 50 U/L (ref 0–50)
ALT SERPL W P-5'-P-CCNC: 89 U/L (ref 0–50)
AMORPH CRY #/AREA URNS HPF: ABNORMAL /HPF
ANION GAP SERPL CALCULATED.3IONS-SCNC: 10 MMOL/L (ref 3–14)
ANION GAP SERPL CALCULATED.3IONS-SCNC: 11 MMOL/L (ref 3–14)
ANION GAP SERPL CALCULATED.3IONS-SCNC: 4 MMOL/L (ref 3–14)
ANION GAP SERPL CALCULATED.3IONS-SCNC: 5 MMOL/L (ref 3–14)
ANION GAP SERPL CALCULATED.3IONS-SCNC: 5 MMOL/L (ref 3–14)
ANION GAP SERPL CALCULATED.3IONS-SCNC: 6 MMOL/L (ref 3–14)
ANION GAP SERPL CALCULATED.3IONS-SCNC: 7 MMOL/L (ref 3–14)
ANION GAP SERPL CALCULATED.3IONS-SCNC: 7 MMOL/L (ref 3–14)
APPEARANCE UR: ABNORMAL
AST SERPL W P-5'-P-CCNC: 109 U/L (ref 0–45)
AST SERPL W P-5'-P-CCNC: 23 U/L (ref 0–45)
AST SERPL W P-5'-P-CCNC: 24 U/L (ref 0–45)
BACTERIA SPEC CULT: ABNORMAL
BACTERIA SPEC CULT: NO GROWTH
BASOPHILS # BLD AUTO: 0 10E9/L (ref 0–0.2)
BASOPHILS # BLD AUTO: 0.1 10E9/L (ref 0–0.2)
BASOPHILS NFR BLD AUTO: 0.1 %
BASOPHILS NFR BLD AUTO: 0.3 %
BILIRUB DIRECT SERPL-MCNC: 0.3 MG/DL (ref 0–0.2)
BILIRUB SERPL-MCNC: 0.6 MG/DL (ref 0.2–1.3)
BILIRUB SERPL-MCNC: 0.9 MG/DL (ref 0.2–1.3)
BILIRUB SERPL-MCNC: 1 MG/DL (ref 0.2–1.3)
BILIRUB UR QL STRIP: ABNORMAL
BUN SERPL-MCNC: 15 MG/DL (ref 7–30)
BUN SERPL-MCNC: 17 MG/DL (ref 7–30)
BUN SERPL-MCNC: 19 MG/DL (ref 7–30)
BUN SERPL-MCNC: 21 MG/DL (ref 7–30)
BUN SERPL-MCNC: 22 MG/DL (ref 7–30)
BUN SERPL-MCNC: 23 MG/DL (ref 7–30)
BUN SERPL-MCNC: 23 MG/DL (ref 7–30)
BUN SERPL-MCNC: 25 MG/DL (ref 7–30)
BUN SERPL-MCNC: 27 MG/DL (ref 7–30)
BUN SERPL-MCNC: 30 MG/DL (ref 7–30)
BUN SERPL-MCNC: 43 MG/DL (ref 7–30)
BUN SERPL-MCNC: 44 MG/DL (ref 7–30)
CALCIUM SERPL-MCNC: 10.2 MG/DL (ref 8.5–10.1)
CALCIUM SERPL-MCNC: 8.4 MG/DL (ref 8.5–10.1)
CALCIUM SERPL-MCNC: 8.5 MG/DL (ref 8.5–10.1)
CALCIUM SERPL-MCNC: 8.6 MG/DL (ref 8.5–10.1)
CALCIUM SERPL-MCNC: 8.6 MG/DL (ref 8.5–10.1)
CALCIUM SERPL-MCNC: 8.9 MG/DL (ref 8.5–10.1)
CALCIUM SERPL-MCNC: 9 MG/DL (ref 8.5–10.1)
CALCIUM SERPL-MCNC: 9 MG/DL (ref 8.5–10.1)
CALCIUM SERPL-MCNC: 9.1 MG/DL (ref 8.5–10.1)
CALCIUM SERPL-MCNC: 9.2 MG/DL (ref 8.5–10.1)
CALCIUM SERPL-MCNC: 9.3 MG/DL (ref 8.5–10.1)
CALCIUM SERPL-MCNC: 9.5 MG/DL (ref 8.5–10.1)
CHLORIDE SERPL-SCNC: 106 MMOL/L (ref 94–109)
CHLORIDE SERPL-SCNC: 113 MMOL/L (ref 94–109)
CHLORIDE SERPL-SCNC: 115 MMOL/L (ref 94–109)
CHLORIDE SERPL-SCNC: 118 MMOL/L (ref 94–109)
CHLORIDE SERPL-SCNC: 119 MMOL/L (ref 94–109)
CHLORIDE SERPL-SCNC: 119 MMOL/L (ref 94–109)
CHLORIDE SERPL-SCNC: 120 MMOL/L (ref 94–109)
CHLORIDE SERPL-SCNC: 121 MMOL/L (ref 94–109)
CHLORIDE SERPL-SCNC: 121 MMOL/L (ref 94–109)
CHLORIDE SERPL-SCNC: 125 MMOL/L (ref 94–109)
CHLORIDE SERPL-SCNC: 125 MMOL/L (ref 94–109)
CHLORIDE SERPL-SCNC: 126 MMOL/L (ref 94–109)
CK SERPL-CCNC: 1137 U/L (ref 30–225)
CK SERPL-CCNC: 3693 U/L (ref 30–225)
CK SERPL-CCNC: 518 U/L (ref 30–225)
CK SERPL-CCNC: 6077 U/L (ref 30–225)
CO2 BLDCOV-SCNC: 22 MMOL/L (ref 21–28)
CO2 BLDCOV-SCNC: 23 MMOL/L (ref 21–28)
CO2 SERPL-SCNC: 18 MMOL/L (ref 20–32)
CO2 SERPL-SCNC: 20 MMOL/L (ref 20–32)
CO2 SERPL-SCNC: 21 MMOL/L (ref 20–32)
CO2 SERPL-SCNC: 22 MMOL/L (ref 20–32)
CO2 SERPL-SCNC: 23 MMOL/L (ref 20–32)
CO2 SERPL-SCNC: 24 MMOL/L (ref 20–32)
CO2 SERPL-SCNC: 24 MMOL/L (ref 20–32)
COLOR UR AUTO: ABNORMAL
CREAT SERPL-MCNC: 0.67 MG/DL (ref 0.52–1.04)
CREAT SERPL-MCNC: 0.71 MG/DL (ref 0.52–1.04)
CREAT SERPL-MCNC: 0.75 MG/DL (ref 0.52–1.04)
CREAT SERPL-MCNC: 0.88 MG/DL (ref 0.52–1.04)
CREAT SERPL-MCNC: 0.92 MG/DL (ref 0.52–1.04)
CREAT SERPL-MCNC: 0.95 MG/DL (ref 0.52–1.04)
CREAT SERPL-MCNC: 0.95 MG/DL (ref 0.52–1.04)
CREAT SERPL-MCNC: 1.06 MG/DL (ref 0.52–1.04)
CREAT SERPL-MCNC: 1.86 MG/DL (ref 0.52–1.04)
CREAT SERPL-MCNC: 2.89 MG/DL (ref 0.52–1.04)
DIFFERENTIAL METHOD BLD: ABNORMAL
DIFFERENTIAL METHOD BLD: ABNORMAL
EOSINOPHIL # BLD AUTO: 0 10E9/L (ref 0–0.7)
EOSINOPHIL # BLD AUTO: 0.2 10E9/L (ref 0–0.7)
EOSINOPHIL NFR BLD AUTO: 0 %
EOSINOPHIL NFR BLD AUTO: 0.5 %
ERYTHROCYTE [DISTWIDTH] IN BLOOD BY AUTOMATED COUNT: 13.4 % (ref 10–15)
ERYTHROCYTE [DISTWIDTH] IN BLOOD BY AUTOMATED COUNT: 13.6 % (ref 10–15)
ERYTHROCYTE [DISTWIDTH] IN BLOOD BY AUTOMATED COUNT: 13.9 % (ref 10–15)
ERYTHROCYTE [DISTWIDTH] IN BLOOD BY AUTOMATED COUNT: 14.2 % (ref 10–15)
ERYTHROCYTE [DISTWIDTH] IN BLOOD BY AUTOMATED COUNT: 14.3 % (ref 10–15)
ERYTHROCYTE [DISTWIDTH] IN BLOOD BY AUTOMATED COUNT: 14.5 % (ref 10–15)
ERYTHROCYTE [DISTWIDTH] IN BLOOD BY AUTOMATED COUNT: 14.5 % (ref 10–15)
ERYTHROCYTE [DISTWIDTH] IN BLOOD BY AUTOMATED COUNT: 14.7 % (ref 10–15)
ERYTHROCYTE [DISTWIDTH] IN BLOOD BY AUTOMATED COUNT: 15 % (ref 10–15)
ERYTHROCYTE [DISTWIDTH] IN BLOOD BY AUTOMATED COUNT: 15.2 % (ref 10–15)
ERYTHROCYTE [DISTWIDTH] IN BLOOD BY AUTOMATED COUNT: 15.5 % (ref 10–15)
GFR SERPL CREATININE-BSD FRML MDRD: 15 ML/MIN/{1.73_M2}
GFR SERPL CREATININE-BSD FRML MDRD: 25 ML/MIN/{1.73_M2}
GFR SERPL CREATININE-BSD FRML MDRD: 49 ML/MIN/{1.73_M2}
GFR SERPL CREATININE-BSD FRML MDRD: 54 ML/MIN/{1.73_M2}
GFR SERPL CREATININE-BSD FRML MDRD: 56 ML/MIN/{1.73_M2}
GFR SERPL CREATININE-BSD FRML MDRD: 58 ML/MIN/{1.73_M2}
GFR SERPL CREATININE-BSD FRML MDRD: 61 ML/MIN/{1.73_M2}
GFR SERPL CREATININE-BSD FRML MDRD: 74 ML/MIN/{1.73_M2}
GFR SERPL CREATININE-BSD FRML MDRD: 74 ML/MIN/{1.73_M2}
GFR SERPL CREATININE-BSD FRML MDRD: 75 ML/MIN/{1.73_M2}
GFR SERPL CREATININE-BSD FRML MDRD: 80 ML/MIN/{1.73_M2}
GFR SERPL CREATININE-BSD FRML MDRD: 82 ML/MIN/{1.73_M2}
GLUCOSE SERPL-MCNC: 106 MG/DL (ref 70–99)
GLUCOSE SERPL-MCNC: 110 MG/DL (ref 70–99)
GLUCOSE SERPL-MCNC: 112 MG/DL (ref 70–99)
GLUCOSE SERPL-MCNC: 115 MG/DL (ref 70–99)
GLUCOSE SERPL-MCNC: 117 MG/DL (ref 70–99)
GLUCOSE SERPL-MCNC: 120 MG/DL (ref 70–99)
GLUCOSE SERPL-MCNC: 123 MG/DL (ref 70–99)
GLUCOSE SERPL-MCNC: 132 MG/DL (ref 70–99)
GLUCOSE SERPL-MCNC: 134 MG/DL (ref 70–99)
GLUCOSE SERPL-MCNC: 135 MG/DL (ref 70–99)
GLUCOSE SERPL-MCNC: 139 MG/DL (ref 70–99)
GLUCOSE SERPL-MCNC: 142 MG/DL (ref 70–99)
GLUCOSE UR STRIP-MCNC: 30 MG/DL
GRAM STN SPEC: ABNORMAL
HCT VFR BLD AUTO: 39.9 % (ref 35–47)
HCT VFR BLD AUTO: 40 % (ref 35–47)
HCT VFR BLD AUTO: 40.3 % (ref 35–47)
HCT VFR BLD AUTO: 41.3 % (ref 35–47)
HCT VFR BLD AUTO: 41.3 % (ref 35–47)
HCT VFR BLD AUTO: 43.6 % (ref 35–47)
HCT VFR BLD AUTO: 43.7 % (ref 35–47)
HCT VFR BLD AUTO: 43.9 % (ref 35–47)
HCT VFR BLD AUTO: 44.8 % (ref 35–47)
HCT VFR BLD AUTO: 44.9 % (ref 35–47)
HCT VFR BLD AUTO: 46.1 % (ref 35–47)
HGB BLD-MCNC: 12.1 G/DL (ref 11.7–15.7)
HGB BLD-MCNC: 12.4 G/DL (ref 11.7–15.7)
HGB BLD-MCNC: 12.8 G/DL (ref 11.7–15.7)
HGB BLD-MCNC: 13 G/DL (ref 11.7–15.7)
HGB BLD-MCNC: 13.1 G/DL (ref 11.7–15.7)
HGB BLD-MCNC: 13.3 G/DL (ref 11.7–15.7)
HGB BLD-MCNC: 13.6 G/DL (ref 11.7–15.7)
HGB BLD-MCNC: 13.7 G/DL (ref 11.7–15.7)
HGB BLD-MCNC: 14.1 G/DL (ref 11.7–15.7)
HGB BLD-MCNC: 14.6 G/DL (ref 11.7–15.7)
HGB BLD-MCNC: 14.7 G/DL (ref 11.7–15.7)
HGB UR QL STRIP: ABNORMAL
HYALINE CASTS #/AREA URNS LPF: 25 /LPF (ref 0–2)
IMM GRANULOCYTES # BLD: 0.3 10E9/L (ref 0–0.4)
IMM GRANULOCYTES # BLD: 1.2 10E9/L (ref 0–0.4)
IMM GRANULOCYTES NFR BLD: 1 %
IMM GRANULOCYTES NFR BLD: 4.2 %
INTERPRETATION ECG - MUSE: NORMAL
INTERPRETATION ECG - MUSE: NORMAL
KETONES UR STRIP-MCNC: 5 MG/DL
LACTATE BLD-SCNC: 1 MMOL/L (ref 0.7–2)
LACTATE BLD-SCNC: 1.1 MMOL/L (ref 0.7–2)
LACTATE BLD-SCNC: 1.1 MMOL/L (ref 0.7–2)
LACTATE BLD-SCNC: 1.2 MMOL/L (ref 0.7–2)
LACTATE BLD-SCNC: 1.3 MMOL/L (ref 0.7–2)
LACTATE BLD-SCNC: 1.5 MMOL/L (ref 0.7–2)
LACTATE BLD-SCNC: 1.6 MMOL/L (ref 0.7–2)
LACTATE BLD-SCNC: 1.8 MMOL/L (ref 0.7–2)
LACTATE BLD-SCNC: 2.2 MMOL/L (ref 0.7–2.1)
LACTATE BLD-SCNC: 2.3 MMOL/L (ref 0.7–2.1)
LEUKOCYTE ESTERASE UR QL STRIP: NEGATIVE
LYMPHOCYTES # BLD AUTO: 1 10E9/L (ref 0.8–5.3)
LYMPHOCYTES # BLD AUTO: 1.9 10E9/L (ref 0.8–5.3)
LYMPHOCYTES NFR BLD AUTO: 3.1 %
LYMPHOCYTES NFR BLD AUTO: 6.6 %
Lab: ABNORMAL
Lab: ABNORMAL
Lab: NORMAL
MAGNESIUM SERPL-MCNC: 2.3 MG/DL (ref 1.6–2.3)
MAGNESIUM SERPL-MCNC: 2.4 MG/DL (ref 1.6–2.3)
MCH RBC QN AUTO: 27.8 PG (ref 26.5–33)
MCH RBC QN AUTO: 28.1 PG (ref 26.5–33)
MCH RBC QN AUTO: 28.3 PG (ref 26.5–33)
MCH RBC QN AUTO: 28.3 PG (ref 26.5–33)
MCH RBC QN AUTO: 28.6 PG (ref 26.5–33)
MCH RBC QN AUTO: 28.8 PG (ref 26.5–33)
MCH RBC QN AUTO: 29.1 PG (ref 26.5–33)
MCHC RBC AUTO-ENTMCNC: 30 G/DL (ref 31.5–36.5)
MCHC RBC AUTO-ENTMCNC: 30 G/DL (ref 31.5–36.5)
MCHC RBC AUTO-ENTMCNC: 30.4 G/DL (ref 31.5–36.5)
MCHC RBC AUTO-ENTMCNC: 31.2 G/DL (ref 31.5–36.5)
MCHC RBC AUTO-ENTMCNC: 31.2 G/DL (ref 31.5–36.5)
MCHC RBC AUTO-ENTMCNC: 31.5 G/DL (ref 31.5–36.5)
MCHC RBC AUTO-ENTMCNC: 31.7 G/DL (ref 31.5–36.5)
MCHC RBC AUTO-ENTMCNC: 31.7 G/DL (ref 31.5–36.5)
MCHC RBC AUTO-ENTMCNC: 32.1 G/DL (ref 31.5–36.5)
MCHC RBC AUTO-ENTMCNC: 32.5 G/DL (ref 31.5–36.5)
MCHC RBC AUTO-ENTMCNC: 32.7 G/DL (ref 31.5–36.5)
MCV RBC AUTO: 88 FL (ref 78–100)
MCV RBC AUTO: 89 FL (ref 78–100)
MCV RBC AUTO: 89 FL (ref 78–100)
MCV RBC AUTO: 90 FL (ref 78–100)
MCV RBC AUTO: 91 FL (ref 78–100)
MCV RBC AUTO: 91 FL (ref 78–100)
MCV RBC AUTO: 92 FL (ref 78–100)
MCV RBC AUTO: 93 FL (ref 78–100)
MCV RBC AUTO: 93 FL (ref 78–100)
MONOCYTES # BLD AUTO: 1.9 10E9/L (ref 0–1.3)
MONOCYTES # BLD AUTO: 3 10E9/L (ref 0–1.3)
MONOCYTES NFR BLD AUTO: 6.5 %
MONOCYTES NFR BLD AUTO: 9.4 %
MUCOUS THREADS #/AREA URNS LPF: PRESENT /LPF
NEUTROPHILS # BLD AUTO: 23.2 10E9/L (ref 1.6–8.3)
NEUTROPHILS # BLD AUTO: 27.3 10E9/L (ref 1.6–8.3)
NEUTROPHILS NFR BLD AUTO: 81.9 %
NEUTROPHILS NFR BLD AUTO: 86.4 %
NITRATE UR QL: NEGATIVE
NRBC # BLD AUTO: 0 10*3/UL
NRBC # BLD AUTO: 0 10*3/UL
NRBC BLD AUTO-RTO: 0 /100
NRBC BLD AUTO-RTO: 0 /100
NT-PROBNP SERPL-MCNC: 2701 PG/ML (ref 0–1800)
PCO2 BLDV: 40 MM HG (ref 40–50)
PCO2 BLDV: 41 MM HG (ref 40–50)
PH BLDV: 7.34 PH (ref 7.32–7.43)
PH BLDV: 7.35 PH (ref 7.32–7.43)
PH UR STRIP: 5.5 PH (ref 5–7)
PHOSPHATE SERPL-MCNC: 2.8 MG/DL (ref 2.5–4.5)
PLATELET # BLD AUTO: 175 10E9/L (ref 150–450)
PLATELET # BLD AUTO: 201 10E9/L (ref 150–450)
PLATELET # BLD AUTO: 213 10E9/L (ref 150–450)
PLATELET # BLD AUTO: 214 10E9/L (ref 150–450)
PLATELET # BLD AUTO: 219 10E9/L (ref 150–450)
PLATELET # BLD AUTO: 220 10E9/L (ref 150–450)
PLATELET # BLD AUTO: 221 10E9/L (ref 150–450)
PLATELET # BLD AUTO: 234 10E9/L (ref 150–450)
PLATELET # BLD AUTO: 252 10E9/L (ref 150–450)
PLATELET # BLD AUTO: 253 10E9/L (ref 150–450)
PLATELET # BLD AUTO: 333 10E9/L (ref 150–450)
PO2 BLDV: 16 MM HG (ref 25–47)
PO2 BLDV: 17 MM HG (ref 25–47)
POTASSIUM SERPL-SCNC: 3.1 MMOL/L (ref 3.4–5.3)
POTASSIUM SERPL-SCNC: 3.3 MMOL/L (ref 3.4–5.3)
POTASSIUM SERPL-SCNC: 3.3 MMOL/L (ref 3.4–5.3)
POTASSIUM SERPL-SCNC: 3.5 MMOL/L (ref 3.4–5.3)
POTASSIUM SERPL-SCNC: 3.5 MMOL/L (ref 3.4–5.3)
POTASSIUM SERPL-SCNC: 3.6 MMOL/L (ref 3.4–5.3)
POTASSIUM SERPL-SCNC: 3.6 MMOL/L (ref 3.4–5.3)
POTASSIUM SERPL-SCNC: 3.7 MMOL/L (ref 3.4–5.3)
POTASSIUM SERPL-SCNC: 3.8 MMOL/L (ref 3.4–5.3)
POTASSIUM SERPL-SCNC: 3.8 MMOL/L (ref 3.4–5.3)
POTASSIUM SERPL-SCNC: 3.9 MMOL/L (ref 3.4–5.3)
POTASSIUM SERPL-SCNC: 4 MMOL/L (ref 3.4–5.3)
POTASSIUM SERPL-SCNC: 4.2 MMOL/L (ref 3.4–5.3)
POTASSIUM SERPL-SCNC: 4.3 MMOL/L (ref 3.4–5.3)
PROCALCITONIN SERPL-MCNC: 0.3 NG/ML
PROT SERPL-MCNC: 5.3 G/DL (ref 6.8–8.8)
PROT SERPL-MCNC: 5.8 G/DL (ref 6.8–8.8)
PROT SERPL-MCNC: 6.1 G/DL (ref 6.8–8.8)
RBC # BLD AUTO: 4.35 10E12/L (ref 3.8–5.2)
RBC # BLD AUTO: 4.46 10E12/L (ref 3.8–5.2)
RBC # BLD AUTO: 4.54 10E12/L (ref 3.8–5.2)
RBC # BLD AUTO: 4.55 10E12/L (ref 3.8–5.2)
RBC # BLD AUTO: 4.63 10E12/L (ref 3.8–5.2)
RBC # BLD AUTO: 4.74 10E12/L (ref 3.8–5.2)
RBC # BLD AUTO: 4.84 10E12/L (ref 3.8–5.2)
RBC # BLD AUTO: 4.84 10E12/L (ref 3.8–5.2)
RBC # BLD AUTO: 5.05 10E12/L (ref 3.8–5.2)
RBC # BLD AUTO: 5.07 10E12/L (ref 3.8–5.2)
RBC # BLD AUTO: 5.08 10E12/L (ref 3.8–5.2)
RBC #/AREA URNS AUTO: 5 /HPF (ref 0–2)
SAO2 % BLDV FROM PO2: 21 %
SAO2 % BLDV FROM PO2: 22 %
SODIUM SERPL-SCNC: 140 MMOL/L (ref 133–144)
SODIUM SERPL-SCNC: 141 MMOL/L (ref 133–144)
SODIUM SERPL-SCNC: 142 MMOL/L (ref 133–144)
SODIUM SERPL-SCNC: 142 MMOL/L (ref 133–144)
SODIUM SERPL-SCNC: 143 MMOL/L (ref 133–144)
SODIUM SERPL-SCNC: 146 MMOL/L (ref 133–144)
SODIUM SERPL-SCNC: 147 MMOL/L (ref 133–144)
SODIUM SERPL-SCNC: 148 MMOL/L (ref 133–144)
SODIUM SERPL-SCNC: 150 MMOL/L (ref 133–144)
SODIUM SERPL-SCNC: 152 MMOL/L (ref 133–144)
SODIUM SERPL-SCNC: 153 MMOL/L (ref 133–144)
SODIUM SERPL-SCNC: 154 MMOL/L (ref 133–144)
SOURCE: ABNORMAL
SP GR UR STRIP: 1.02 (ref 1–1.03)
SPECIMEN SOURCE: ABNORMAL
SPECIMEN SOURCE: NORMAL
SQUAMOUS #/AREA URNS AUTO: 4 /HPF (ref 0–1)
UROBILINOGEN UR STRIP-MCNC: 2 MG/DL (ref 0–2)
VANCOMYCIN SERPL-MCNC: 17.1 MG/L
VANCOMYCIN SERPL-MCNC: 22.4 MG/L
VANCOMYCIN SERPL-MCNC: 34.3 MG/L
WBC # BLD AUTO: 15.2 10E9/L (ref 4–11)
WBC # BLD AUTO: 15.4 10E9/L (ref 4–11)
WBC # BLD AUTO: 18.2 10E9/L (ref 4–11)
WBC # BLD AUTO: 18.2 10E9/L (ref 4–11)
WBC # BLD AUTO: 20.3 10E9/L (ref 4–11)
WBC # BLD AUTO: 20.3 10E9/L (ref 4–11)
WBC # BLD AUTO: 21.1 10E9/L (ref 4–11)
WBC # BLD AUTO: 22.6 10E9/L (ref 4–11)
WBC # BLD AUTO: 28.3 10E9/L (ref 4–11)
WBC # BLD AUTO: 31.6 10E9/L (ref 4–11)
WBC # BLD AUTO: 36.2 10E9/L (ref 4–11)
WBC #/AREA URNS AUTO: 14 /HPF (ref 0–5)

## 2019-01-01 PROCEDURE — 99231 SBSQ HOSP IP/OBS SF/LOW 25: CPT | Performed by: SURGERY

## 2019-01-01 PROCEDURE — 81001 URINALYSIS AUTO W/SCOPE: CPT | Performed by: EMERGENCY MEDICINE

## 2019-01-01 PROCEDURE — 25000132 ZZH RX MED GY IP 250 OP 250 PS 637: Mod: GY | Performed by: HOSPITALIST

## 2019-01-01 PROCEDURE — 92526 ORAL FUNCTION THERAPY: CPT | Mod: GN

## 2019-01-01 PROCEDURE — 25000128 H RX IP 250 OP 636: Performed by: INTERNAL MEDICINE

## 2019-01-01 PROCEDURE — 25800030 ZZH RX IP 258 OP 636: Performed by: HOSPITALIST

## 2019-01-01 PROCEDURE — 25000128 H RX IP 250 OP 636: Performed by: HOSPITALIST

## 2019-01-01 PROCEDURE — 12000000 ZZH R&B MED SURG/OB

## 2019-01-01 PROCEDURE — 25000125 ZZHC RX 250: Performed by: HOSPITALIST

## 2019-01-01 PROCEDURE — 85027 COMPLETE CBC AUTOMATED: CPT | Performed by: INTERNAL MEDICINE

## 2019-01-01 PROCEDURE — 84100 ASSAY OF PHOSPHORUS: CPT | Performed by: HOSPITALIST

## 2019-01-01 PROCEDURE — 71045 X-RAY EXAM CHEST 1 VIEW: CPT

## 2019-01-01 PROCEDURE — 99221 1ST HOSP IP/OBS SF/LOW 40: CPT | Mod: 25 | Performed by: INTERNAL MEDICINE

## 2019-01-01 PROCEDURE — 99310 SBSQ NF CARE HIGH MDM 45: CPT | Mod: GV | Performed by: NURSE PRACTITIONER

## 2019-01-01 PROCEDURE — 85025 COMPLETE CBC W/AUTO DIFF WBC: CPT | Performed by: EMERGENCY MEDICINE

## 2019-01-01 PROCEDURE — 25000132 ZZH RX MED GY IP 250 OP 250 PS 637: Mod: GY | Performed by: PHYSICIAN ASSISTANT

## 2019-01-01 PROCEDURE — 96365 THER/PROPH/DIAG IV INF INIT: CPT

## 2019-01-01 PROCEDURE — 99223 1ST HOSP IP/OBS HIGH 75: CPT | Mod: GC | Performed by: PSYCHIATRY & NEUROLOGY

## 2019-01-01 PROCEDURE — 74018 RADEX ABDOMEN 1 VIEW: CPT

## 2019-01-01 PROCEDURE — 83880 ASSAY OF NATRIURETIC PEPTIDE: CPT | Performed by: HOSPITALIST

## 2019-01-01 PROCEDURE — 96368 THER/DIAG CONCURRENT INF: CPT

## 2019-01-01 PROCEDURE — 40000239 ZZH STATISTIC VAT ROUNDS

## 2019-01-01 PROCEDURE — 80053 COMPREHEN METABOLIC PANEL: CPT | Performed by: HOSPITALIST

## 2019-01-01 PROCEDURE — 96376 TX/PRO/DX INJ SAME DRUG ADON: CPT

## 2019-01-01 PROCEDURE — 80202 ASSAY OF VANCOMYCIN: CPT | Performed by: HOSPITALIST

## 2019-01-01 PROCEDURE — 99231 SBSQ HOSP IP/OBS SF/LOW 25: CPT | Performed by: PHYSICIAN ASSISTANT

## 2019-01-01 PROCEDURE — C9113 INJ PANTOPRAZOLE SODIUM, VIA: HCPCS | Performed by: HOSPITALIST

## 2019-01-01 PROCEDURE — 85027 COMPLETE CBC AUTOMATED: CPT | Performed by: PHYSICIAN ASSISTANT

## 2019-01-01 PROCEDURE — 37191 INS ENDOVAS VENA CAVA FILTR: CPT

## 2019-01-01 PROCEDURE — 87070 CULTURE OTHR SPECIMN AEROBIC: CPT | Performed by: HOSPITALIST

## 2019-01-01 PROCEDURE — 25500064 ZZH RX 255 OP 636: Performed by: NEUROLOGICAL SURGERY

## 2019-01-01 PROCEDURE — 36415 COLL VENOUS BLD VENIPUNCTURE: CPT | Performed by: PHYSICIAN ASSISTANT

## 2019-01-01 PROCEDURE — 25800030 ZZH RX IP 258 OP 636: Performed by: PHYSICIAN ASSISTANT

## 2019-01-01 PROCEDURE — 25000132 ZZH RX MED GY IP 250 OP 250 PS 637: Mod: GY | Performed by: NURSE PRACTITIONER

## 2019-01-01 PROCEDURE — 99285 EMERGENCY DEPT VISIT HI MDM: CPT | Mod: 25

## 2019-01-01 PROCEDURE — 93970 EXTREMITY STUDY: CPT

## 2019-01-01 PROCEDURE — 06H03DZ INSERTION OF INTRALUMINAL DEVICE INTO INFERIOR VENA CAVA, PERCUTANEOUS APPROACH: ICD-10-PCS | Performed by: RADIOLOGY

## 2019-01-01 PROCEDURE — 25000128 H RX IP 250 OP 636: Performed by: EMERGENCY MEDICINE

## 2019-01-01 PROCEDURE — 83605 ASSAY OF LACTIC ACID: CPT | Performed by: HOSPITALIST

## 2019-01-01 PROCEDURE — 84145 PROCALCITONIN (PCT): CPT | Performed by: HOSPITALIST

## 2019-01-01 PROCEDURE — 40000986 XR CHEST PORT 1 VW

## 2019-01-01 PROCEDURE — 80048 BASIC METABOLIC PNL TOTAL CA: CPT | Performed by: PHYSICIAN ASSISTANT

## 2019-01-01 PROCEDURE — 25000132 ZZH RX MED GY IP 250 OP 250 PS 637: Mod: GY | Performed by: INTERNAL MEDICINE

## 2019-01-01 PROCEDURE — 80048 BASIC METABOLIC PNL TOTAL CA: CPT | Performed by: HOSPITALIST

## 2019-01-01 PROCEDURE — 99305 1ST NF CARE MODERATE MDM 35: CPT | Mod: AI | Performed by: INTERNAL MEDICINE

## 2019-01-01 PROCEDURE — 99233 SBSQ HOSP IP/OBS HIGH 50: CPT | Performed by: INTERNAL MEDICINE

## 2019-01-01 PROCEDURE — 27210219 ZZH KIT SHRLOCK 5FR DBL LUM PWR P

## 2019-01-01 PROCEDURE — 25000128 H RX IP 250 OP 636: Performed by: PHYSICIAN ASSISTANT

## 2019-01-01 PROCEDURE — 25800030 ZZH RX IP 258 OP 636: Performed by: EMERGENCY MEDICINE

## 2019-01-01 PROCEDURE — 36415 COLL VENOUS BLD VENIPUNCTURE: CPT | Performed by: HOSPITALIST

## 2019-01-01 PROCEDURE — 83605 ASSAY OF LACTIC ACID: CPT

## 2019-01-01 PROCEDURE — 82550 ASSAY OF CK (CPK): CPT | Performed by: HOSPITALIST

## 2019-01-01 PROCEDURE — 40000141 ZZH STATISTIC PERIPHERAL IV START W/O US GUIDANCE

## 2019-01-01 PROCEDURE — 82565 ASSAY OF CREATININE: CPT | Performed by: HOSPITALIST

## 2019-01-01 PROCEDURE — 99207 ZZC APP CREDIT; MD BILLING SHARED VISIT: CPT | Performed by: PHYSICIAN ASSISTANT

## 2019-01-01 PROCEDURE — 83605 ASSAY OF LACTIC ACID: CPT | Performed by: PHYSICIAN ASSISTANT

## 2019-01-01 PROCEDURE — 25800030 ZZH RX IP 258 OP 636: Performed by: RADIOLOGY

## 2019-01-01 PROCEDURE — 82803 BLOOD GASES ANY COMBINATION: CPT

## 2019-01-01 PROCEDURE — 25000128 H RX IP 250 OP 636: Performed by: RADIOLOGY

## 2019-01-01 PROCEDURE — 84132 ASSAY OF SERUM POTASSIUM: CPT | Performed by: INTERNAL MEDICINE

## 2019-01-01 PROCEDURE — 85027 COMPLETE CBC AUTOMATED: CPT | Performed by: HOSPITALIST

## 2019-01-01 PROCEDURE — 83605 ASSAY OF LACTIC ACID: CPT | Performed by: INTERNAL MEDICINE

## 2019-01-01 PROCEDURE — 99222 1ST HOSP IP/OBS MODERATE 55: CPT | Performed by: SURGERY

## 2019-01-01 PROCEDURE — 93306 TTE W/DOPPLER COMPLETE: CPT | Mod: 26 | Performed by: INTERNAL MEDICINE

## 2019-01-01 PROCEDURE — 92610 EVALUATE SWALLOWING FUNCTION: CPT | Mod: GN | Performed by: SPEECH-LANGUAGE PATHOLOGIST

## 2019-01-01 PROCEDURE — 87076 CULTURE ANAEROBE IDENT EACH: CPT | Performed by: HOSPITALIST

## 2019-01-01 PROCEDURE — 80048 BASIC METABOLIC PNL TOTAL CA: CPT | Performed by: INTERNAL MEDICINE

## 2019-01-01 PROCEDURE — 99233 SBSQ HOSP IP/OBS HIGH 50: CPT | Performed by: HOSPITALIST

## 2019-01-01 PROCEDURE — 97530 THERAPEUTIC ACTIVITIES: CPT | Mod: GP

## 2019-01-01 PROCEDURE — 40000264 ECHOCARDIOGRAM COMPLETE

## 2019-01-01 PROCEDURE — 21000001 ZZH R&B HEART CARE

## 2019-01-01 PROCEDURE — 27210886 ZZH ACCESSORY CR5

## 2019-01-01 PROCEDURE — 74283 THER NMA RDCTJ INTUS/OBSTRCJ: CPT

## 2019-01-01 PROCEDURE — 97161 PT EVAL LOW COMPLEX 20 MIN: CPT | Mod: GP

## 2019-01-01 PROCEDURE — 82550 ASSAY OF CK (CPK): CPT | Performed by: PHYSICIAN ASSISTANT

## 2019-01-01 PROCEDURE — 80076 HEPATIC FUNCTION PANEL: CPT | Performed by: HOSPITALIST

## 2019-01-01 PROCEDURE — 99223 1ST HOSP IP/OBS HIGH 75: CPT | Performed by: NURSE PRACTITIONER

## 2019-01-01 PROCEDURE — 99207 ZZC CDG-MDM COMPONENT: MEETS MODERATE - UP CODED: CPT | Performed by: INTERNAL MEDICINE

## 2019-01-01 PROCEDURE — 36415 COLL VENOUS BLD VENIPUNCTURE: CPT | Performed by: EMERGENCY MEDICINE

## 2019-01-01 PROCEDURE — 87075 CULTR BACTERIA EXCEPT BLOOD: CPT | Performed by: HOSPITALIST

## 2019-01-01 PROCEDURE — 99238 HOSP IP/OBS DSCHRG MGMT 30/<: CPT | Performed by: INTERNAL MEDICINE

## 2019-01-01 PROCEDURE — 87186 SC STD MICRODIL/AGAR DIL: CPT | Performed by: HOSPITALIST

## 2019-01-01 PROCEDURE — 99232 SBSQ HOSP IP/OBS MODERATE 35: CPT | Performed by: INTERNAL MEDICINE

## 2019-01-01 PROCEDURE — 25000125 ZZHC RX 250: Performed by: EMERGENCY MEDICINE

## 2019-01-01 PROCEDURE — 36569 INSJ PICC 5 YR+ W/O IMAGING: CPT

## 2019-01-01 PROCEDURE — 87086 URINE CULTURE/COLONY COUNT: CPT | Performed by: EMERGENCY MEDICINE

## 2019-01-01 PROCEDURE — 96366 THER/PROPH/DIAG IV INF ADDON: CPT

## 2019-01-01 PROCEDURE — 92526 ORAL FUNCTION THERAPY: CPT | Mod: GN | Performed by: SPEECH-LANGUAGE PATHOLOGIST

## 2019-01-01 PROCEDURE — C1880 VENA CAVA FILTER: HCPCS

## 2019-01-01 PROCEDURE — 36415 COLL VENOUS BLD VENIPUNCTURE: CPT | Performed by: INTERNAL MEDICINE

## 2019-01-01 PROCEDURE — 71046 X-RAY EXAM CHEST 2 VIEWS: CPT

## 2019-01-01 PROCEDURE — 25000128 H RX IP 250 OP 636

## 2019-01-01 PROCEDURE — 83735 ASSAY OF MAGNESIUM: CPT | Performed by: HOSPITALIST

## 2019-01-01 PROCEDURE — 40000257 ZZH STATISTIC CONSULT NO CHARGE VASC ACCESS

## 2019-01-01 PROCEDURE — C1729 CATH, DRAINAGE: HCPCS

## 2019-01-01 PROCEDURE — 99223 1ST HOSP IP/OBS HIGH 75: CPT | Mod: AI | Performed by: HOSPITALIST

## 2019-01-01 PROCEDURE — 87205 SMEAR GRAM STAIN: CPT | Performed by: HOSPITALIST

## 2019-01-01 PROCEDURE — 87040 BLOOD CULTURE FOR BACTERIA: CPT | Performed by: EMERGENCY MEDICINE

## 2019-01-01 PROCEDURE — 71275 CT ANGIOGRAPHY CHEST: CPT

## 2019-01-01 PROCEDURE — 96361 HYDRATE IV INFUSION ADD-ON: CPT

## 2019-01-01 PROCEDURE — 82550 ASSAY OF CK (CPK): CPT | Performed by: EMERGENCY MEDICINE

## 2019-01-01 PROCEDURE — 99231 SBSQ HOSP IP/OBS SF/LOW 25: CPT | Performed by: INTERNAL MEDICINE

## 2019-01-01 PROCEDURE — C1769 GUIDE WIRE: HCPCS

## 2019-01-01 PROCEDURE — 85025 COMPLETE CBC W/AUTO DIFF WBC: CPT | Performed by: HOSPITALIST

## 2019-01-01 PROCEDURE — 80048 BASIC METABOLIC PNL TOTAL CA: CPT | Performed by: EMERGENCY MEDICINE

## 2019-01-01 PROCEDURE — 25000125 ZZHC RX 250

## 2019-01-01 PROCEDURE — 80076 HEPATIC FUNCTION PANEL: CPT | Performed by: PHYSICIAN ASSISTANT

## 2019-01-01 PROCEDURE — 74176 CT ABD & PELVIS W/O CONTRAST: CPT

## 2019-01-01 PROCEDURE — 83735 ASSAY OF MAGNESIUM: CPT | Performed by: PHYSICIAN ASSISTANT

## 2019-01-01 PROCEDURE — 87077 CULTURE AEROBIC IDENTIFY: CPT | Performed by: HOSPITALIST

## 2019-01-01 PROCEDURE — 25500064 ZZH RX 255 OP 636: Performed by: HOSPITALIST

## 2019-01-01 PROCEDURE — 99232 SBSQ HOSP IP/OBS MODERATE 35: CPT | Performed by: HOSPITALIST

## 2019-01-01 PROCEDURE — 84132 ASSAY OF SERUM POTASSIUM: CPT | Performed by: HOSPITALIST

## 2019-01-01 PROCEDURE — 25000125 ZZHC RX 250: Performed by: PHYSICIAN ASSISTANT

## 2019-01-01 RX ORDER — YEAST,DRIED (S. CEREVISIAE)
1 POWDER (GRAM) ORAL DAILY
Qty: 30 TABLET | Refills: 0 | Status: ON HOLD
Start: 2019-01-01 | End: 2019-01-01

## 2019-01-01 RX ORDER — NICOTINE POLACRILEX 4 MG
15-30 LOZENGE BUCCAL
Status: DISCONTINUED | OUTPATIENT
Start: 2019-01-01 | End: 2019-01-01

## 2019-01-01 RX ORDER — METOPROLOL TARTRATE 50 MG
50 TABLET ORAL 2 TIMES DAILY
Status: DISCONTINUED | OUTPATIENT
Start: 2019-01-01 | End: 2019-01-01

## 2019-01-01 RX ORDER — DEXTROSE MONOHYDRATE 25 G/50ML
25-50 INJECTION, SOLUTION INTRAVENOUS
Status: DISCONTINUED | OUTPATIENT
Start: 2019-01-01 | End: 2019-01-01

## 2019-01-01 RX ORDER — OMEPRAZOLE 10 MG/1
CAPSULE, DELAYED RELEASE ORAL
Qty: 30 CAPSULE | Status: CANCELLED | OUTPATIENT
Start: 2019-01-01

## 2019-01-01 RX ORDER — OLANZAPINE 10 MG/2ML
5 INJECTION, POWDER, FOR SOLUTION INTRAMUSCULAR ONCE
Status: COMPLETED | OUTPATIENT
Start: 2019-01-01 | End: 2019-01-01

## 2019-01-01 RX ORDER — METOPROLOL SUCCINATE 25 MG/1
100 TABLET, EXTENDED RELEASE ORAL 2 TIMES DAILY
Status: DISCONTINUED | OUTPATIENT
Start: 2019-01-01 | End: 2019-01-01

## 2019-01-01 RX ORDER — LEVOTHYROXINE SODIUM 25 UG/1
25 TABLET ORAL DAILY
Qty: 30 TABLET | Refills: 0
Start: 2019-01-01 | End: 2019-01-01

## 2019-01-01 RX ORDER — SODIUM CHLORIDE 9 MG/ML
1000 INJECTION, SOLUTION INTRAVENOUS CONTINUOUS
Status: DISCONTINUED | OUTPATIENT
Start: 2019-01-01 | End: 2019-01-01

## 2019-01-01 RX ORDER — LEVOTHYROXINE SODIUM 25 UG/1
TABLET ORAL
Qty: 90 TABLET | Refills: 3 | Status: ON HOLD | OUTPATIENT
Start: 2019-01-01 | End: 2019-01-01

## 2019-01-01 RX ORDER — LEVETIRACETAM 100 MG/ML
750 SOLUTION ORAL 2 TIMES DAILY
Status: DISCONTINUED | OUTPATIENT
Start: 2019-01-01 | End: 2019-01-01

## 2019-01-01 RX ORDER — QUETIAPINE FUMARATE 50 MG/1
100 TABLET, FILM COATED ORAL 2 TIMES DAILY
Qty: 60 TABLET | Refills: 0
Start: 2019-01-01

## 2019-01-01 RX ORDER — OMEPRAZOLE 10 MG/1
10 CAPSULE, DELAYED RELEASE ORAL DAILY
Status: DISCONTINUED | OUTPATIENT
Start: 2019-01-01 | End: 2019-01-01

## 2019-01-01 RX ORDER — METOPROLOL TARTRATE 1 MG/ML
2.5 INJECTION, SOLUTION INTRAVENOUS EVERY 4 HOURS PRN
Status: DISCONTINUED | OUTPATIENT
Start: 2019-01-01 | End: 2019-01-01

## 2019-01-01 RX ORDER — LEVETIRACETAM 500 MG/1
500 TABLET ORAL 2 TIMES DAILY
Status: DISCONTINUED | OUTPATIENT
Start: 2019-01-01 | End: 2019-01-01

## 2019-01-01 RX ORDER — BISACODYL 10 MG
SUPPOSITORY, RECTAL RECTAL
Qty: 12 SUPPOSITORY | Refills: 1 | Status: SHIPPED | OUTPATIENT
Start: 2019-01-01

## 2019-01-01 RX ORDER — PROCHLORPERAZINE 25 MG
12.5 SUPPOSITORY, RECTAL RECTAL EVERY 12 HOURS PRN
Status: DISCONTINUED | OUTPATIENT
Start: 2019-01-01 | End: 2019-01-01 | Stop reason: HOSPADM

## 2019-01-01 RX ORDER — LEVETIRACETAM 100 MG/ML
750 SOLUTION ORAL 2 TIMES DAILY
Qty: 450 ML | Refills: 0 | Status: SHIPPED | OUTPATIENT
Start: 2019-01-01 | End: 2019-01-01

## 2019-01-01 RX ORDER — ONDANSETRON 2 MG/ML
4 INJECTION INTRAMUSCULAR; INTRAVENOUS EVERY 6 HOURS PRN
Status: DISCONTINUED | OUTPATIENT
Start: 2019-01-01 | End: 2019-01-01 | Stop reason: HOSPADM

## 2019-01-01 RX ORDER — IOPAMIDOL 612 MG/ML
50 INJECTION, SOLUTION INTRAVASCULAR ONCE
Status: COMPLETED | OUTPATIENT
Start: 2019-01-01 | End: 2019-01-01

## 2019-01-01 RX ORDER — DIGOXIN 0.25 MG/ML
125 INJECTION INTRAMUSCULAR; INTRAVENOUS ONCE
Status: COMPLETED | OUTPATIENT
Start: 2019-01-01 | End: 2019-01-01

## 2019-01-01 RX ORDER — HORSE CHESTNUT SEED 150 MG
5 CAPSULE ORAL DAILY
Status: ON HOLD | COMMUNITY
End: 2019-01-01

## 2019-01-01 RX ORDER — METOPROLOL TARTRATE 100 MG
100 TABLET ORAL 2 TIMES DAILY
Status: DISCONTINUED | OUTPATIENT
Start: 2019-01-01 | End: 2019-01-01

## 2019-01-01 RX ORDER — NALOXONE HYDROCHLORIDE 0.4 MG/ML
.1-.4 INJECTION, SOLUTION INTRAMUSCULAR; INTRAVENOUS; SUBCUTANEOUS
Status: DISCONTINUED | OUTPATIENT
Start: 2019-01-01 | End: 2019-01-01

## 2019-01-01 RX ORDER — SODIUM CHLORIDE 9 MG/ML
INJECTION, SOLUTION INTRAVENOUS CONTINUOUS
Status: DISCONTINUED | OUTPATIENT
Start: 2019-01-01 | End: 2019-01-01

## 2019-01-01 RX ORDER — POTASSIUM CHLORIDE 1500 MG/1
20-40 TABLET, EXTENDED RELEASE ORAL
Status: DISCONTINUED | OUTPATIENT
Start: 2019-01-01 | End: 2019-01-01

## 2019-01-01 RX ORDER — PROCHLORPERAZINE 25 MG
12.5 SUPPOSITORY, RECTAL RECTAL EVERY 12 HOURS PRN
Qty: 30 SUPPOSITORY | Refills: 0 | Status: SHIPPED | OUTPATIENT
Start: 2019-01-01 | End: 2019-01-01

## 2019-01-01 RX ORDER — OMEPRAZOLE 10 MG/1
CAPSULE, DELAYED RELEASE ORAL
Qty: 30 CAPSULE | Refills: 3 | Status: SHIPPED | OUTPATIENT
Start: 2019-01-01

## 2019-01-01 RX ORDER — LEVOFLOXACIN 5 MG/ML
500 INJECTION, SOLUTION INTRAVENOUS EVERY 24 HOURS
Status: DISCONTINUED | OUTPATIENT
Start: 2019-01-01 | End: 2019-01-01

## 2019-01-01 RX ORDER — PIPERACILLIN SODIUM, TAZOBACTAM SODIUM 3; .375 G/15ML; G/15ML
3.38 INJECTION, POWDER, LYOPHILIZED, FOR SOLUTION INTRAVENOUS EVERY 6 HOURS
Status: DISCONTINUED | OUTPATIENT
Start: 2019-01-01 | End: 2019-01-01

## 2019-01-01 RX ORDER — SENNOSIDES 8.6 MG
1 TABLET ORAL 2 TIMES DAILY
Qty: 100 TABLET | Refills: 1 | Status: SHIPPED | OUTPATIENT
Start: 2019-01-01

## 2019-01-01 RX ORDER — POTASSIUM CHLORIDE 7.45 MG/ML
10 INJECTION INTRAVENOUS
Status: DISCONTINUED | OUTPATIENT
Start: 2019-01-01 | End: 2019-01-01

## 2019-01-01 RX ORDER — METOPROLOL TARTRATE 1 MG/ML
2.5 INJECTION, SOLUTION INTRAVENOUS EVERY 6 HOURS
Status: DISCONTINUED | OUTPATIENT
Start: 2019-01-01 | End: 2019-01-01

## 2019-01-01 RX ORDER — NALOXONE HYDROCHLORIDE 0.4 MG/ML
.1-.4 INJECTION, SOLUTION INTRAMUSCULAR; INTRAVENOUS; SUBCUTANEOUS
Status: DISCONTINUED | OUTPATIENT
Start: 2019-01-01 | End: 2019-01-01 | Stop reason: HOSPADM

## 2019-01-01 RX ORDER — LEVOFLOXACIN 5 MG/ML
750 INJECTION, SOLUTION INTRAVENOUS EVERY 24 HOURS
Status: COMPLETED | OUTPATIENT
Start: 2019-01-01 | End: 2019-01-01

## 2019-01-01 RX ORDER — LIDOCAINE 40 MG/G
CREAM TOPICAL
Status: DISCONTINUED | OUTPATIENT
Start: 2019-01-01 | End: 2019-01-01

## 2019-01-01 RX ORDER — ATROPINE SULFATE 10 MG/ML
2-4 SOLUTION/ DROPS OPHTHALMIC
Qty: 5 ML | Refills: 1 | Status: SHIPPED | OUTPATIENT
Start: 2019-01-01

## 2019-01-01 RX ORDER — ATROPINE SULFATE 10 MG/ML
1-2 SOLUTION/ DROPS OPHTHALMIC
Status: DISCONTINUED | OUTPATIENT
Start: 2019-01-01 | End: 2019-01-01 | Stop reason: HOSPADM

## 2019-01-01 RX ORDER — POLYETHYLENE GLYCOL 3350 17 G/17G
17 POWDER, FOR SOLUTION ORAL 2 TIMES DAILY
Status: DISCONTINUED | OUTPATIENT
Start: 2019-01-01 | End: 2019-01-01

## 2019-01-01 RX ORDER — DILTIAZEM HYDROCHLORIDE 5 MG/ML
20 INJECTION INTRAVENOUS ONCE
Status: COMPLETED | OUTPATIENT
Start: 2019-01-01 | End: 2019-01-01

## 2019-01-01 RX ORDER — DIGOXIN 0.25 MG/ML
125 INJECTION INTRAMUSCULAR; INTRAVENOUS DAILY
Status: DISCONTINUED | OUTPATIENT
Start: 2019-01-01 | End: 2019-01-01

## 2019-01-01 RX ORDER — DEXTROSE MONOHYDRATE 50 MG/ML
INJECTION, SOLUTION INTRAVENOUS CONTINUOUS
Status: DISCONTINUED | OUTPATIENT
Start: 2019-01-01 | End: 2019-01-01

## 2019-01-01 RX ORDER — ACETAMINOPHEN 325 MG/1
650 TABLET ORAL EVERY 4 HOURS PRN
Status: DISCONTINUED | OUTPATIENT
Start: 2019-01-01 | End: 2019-01-01 | Stop reason: HOSPADM

## 2019-01-01 RX ORDER — ACETAMINOPHEN 325 MG/1
650 TABLET ORAL EVERY 4 HOURS PRN
DISCHARGE
Start: 2019-01-01

## 2019-01-01 RX ORDER — QUETIAPINE FUMARATE 25 MG/1
12.5 TABLET, FILM COATED ORAL 4 TIMES DAILY PRN
Qty: 30 TABLET | Refills: 0 | Status: SHIPPED | OUTPATIENT
Start: 2019-01-01

## 2019-01-01 RX ORDER — LEVETIRACETAM 750 MG/1
TABLET ORAL
Qty: 60 TABLET | Refills: 3 | Status: SHIPPED | OUTPATIENT
Start: 2019-01-01 | End: 2019-01-01

## 2019-01-01 RX ORDER — LEVETIRACETAM 750 MG/1
750 TABLET ORAL 2 TIMES DAILY
Status: DISCONTINUED | OUTPATIENT
Start: 2019-01-01 | End: 2019-01-01

## 2019-01-01 RX ORDER — HYDROMORPHONE HYDROCHLORIDE 1 MG/ML
.3-.5 INJECTION, SOLUTION INTRAMUSCULAR; INTRAVENOUS; SUBCUTANEOUS
Status: DISCONTINUED | OUTPATIENT
Start: 2019-01-01 | End: 2019-01-01 | Stop reason: HOSPADM

## 2019-01-01 RX ORDER — POTASSIUM CHLORIDE 1.5 G/1.58G
20-40 POWDER, FOR SOLUTION ORAL
Status: DISCONTINUED | OUTPATIENT
Start: 2019-01-01 | End: 2019-01-01

## 2019-01-01 RX ORDER — OMEPRAZOLE 10 MG/1
CAPSULE, DELAYED RELEASE ORAL
Qty: 30 CAPSULE | Refills: 3 | Status: SHIPPED | OUTPATIENT
Start: 2019-01-01 | End: 2019-01-01

## 2019-01-01 RX ORDER — HYDROMORPHONE HYDROCHLORIDE 1 MG/ML
1-2 SOLUTION ORAL
Status: DISCONTINUED | OUTPATIENT
Start: 2019-01-01 | End: 2019-01-01 | Stop reason: HOSPADM

## 2019-01-01 RX ORDER — PIPERACILLIN SODIUM, TAZOBACTAM SODIUM 4; .5 G/20ML; G/20ML
4.5 INJECTION, POWDER, LYOPHILIZED, FOR SOLUTION INTRAVENOUS ONCE
Status: COMPLETED | OUTPATIENT
Start: 2019-01-01 | End: 2019-01-01

## 2019-01-01 RX ORDER — LIDOCAINE 40 MG/G
CREAM TOPICAL
Status: DISCONTINUED | OUTPATIENT
Start: 2019-01-01 | End: 2019-01-01 | Stop reason: HOSPADM

## 2019-01-01 RX ORDER — LEVETIRACETAM 100 MG/ML
750 SOLUTION ORAL 2 TIMES DAILY
Status: DISCONTINUED | OUTPATIENT
Start: 2019-01-01 | End: 2019-01-01 | Stop reason: HOSPADM

## 2019-01-01 RX ORDER — QUETIAPINE FUMARATE 50 MG/1
TABLET, FILM COATED ORAL
Qty: 60 TABLET | Refills: 3 | Status: SHIPPED | OUTPATIENT
Start: 2019-01-01 | End: 2019-01-01

## 2019-01-01 RX ORDER — ONDANSETRON 4 MG/1
4 TABLET, ORALLY DISINTEGRATING ORAL EVERY 6 HOURS PRN
Qty: 30 TABLET | Refills: 0 | Status: SHIPPED | OUTPATIENT
Start: 2019-01-01

## 2019-01-01 RX ORDER — ASPIRIN 81 MG/1
TABLET, CHEWABLE ORAL
Qty: 30 TABLET | Refills: 3 | Status: CANCELLED | OUTPATIENT
Start: 2019-01-01

## 2019-01-01 RX ORDER — PROCHLORPERAZINE MALEATE 5 MG
5 TABLET ORAL EVERY 6 HOURS PRN
Status: DISCONTINUED | OUTPATIENT
Start: 2019-01-01 | End: 2019-01-01 | Stop reason: HOSPADM

## 2019-01-01 RX ORDER — HALOPERIDOL 2 MG/ML
.5-1 SOLUTION ORAL EVERY 6 HOURS PRN
Qty: 30 ML | Refills: 1 | Status: SHIPPED | OUTPATIENT
Start: 2019-01-01 | End: 2019-01-01

## 2019-01-01 RX ORDER — QUETIAPINE FUMARATE 25 MG/1
12.5 TABLET, FILM COATED ORAL 4 TIMES DAILY PRN
DISCHARGE
Start: 2019-01-01 | End: 2019-01-01

## 2019-01-01 RX ORDER — ACETAMINOPHEN 500 MG
500 TABLET ORAL EVERY 6 HOURS PRN
Status: DISCONTINUED | OUTPATIENT
Start: 2019-01-01 | End: 2019-01-01

## 2019-01-01 RX ORDER — OLANZAPINE 5 MG/1
5 TABLET, ORALLY DISINTEGRATING ORAL EVERY 8 HOURS PRN
Status: DISCONTINUED | OUTPATIENT
Start: 2019-01-01 | End: 2019-01-01 | Stop reason: HOSPADM

## 2019-01-01 RX ORDER — PROCHLORPERAZINE MALEATE 5 MG
5 TABLET ORAL EVERY 6 HOURS PRN
Qty: 30 TABLET | Refills: 0 | Status: SHIPPED | OUTPATIENT
Start: 2019-01-01 | End: 2019-01-01

## 2019-01-01 RX ORDER — OLANZAPINE 5 MG/1
5 TABLET, ORALLY DISINTEGRATING ORAL DAILY PRN
Status: DISCONTINUED | OUTPATIENT
Start: 2019-01-01 | End: 2019-01-01

## 2019-01-01 RX ORDER — POTASSIUM CHLORIDE 1500 MG/1
40 TABLET, EXTENDED RELEASE ORAL ONCE
Status: COMPLETED | OUTPATIENT
Start: 2019-01-01 | End: 2019-01-01

## 2019-01-01 RX ORDER — LEVETIRACETAM 750 MG/1
TABLET ORAL
Qty: 60 TABLET | Refills: 3 | Status: ON HOLD | OUTPATIENT
Start: 2019-01-01 | End: 2019-01-01

## 2019-01-01 RX ORDER — LIDOCAINE HYDROCHLORIDE 10 MG/ML
INJECTION, SOLUTION INFILTRATION; PERINEURAL
Status: DISCONTINUED
Start: 2019-01-01 | End: 2019-01-01 | Stop reason: HOSPADM

## 2019-01-01 RX ORDER — LEVETIRACETAM 750 MG/1
750 TABLET ORAL 2 TIMES DAILY
COMMUNITY

## 2019-01-01 RX ORDER — QUETIAPINE FUMARATE 25 MG/1
100 TABLET, FILM COATED ORAL 2 TIMES DAILY
Status: DISCONTINUED | OUTPATIENT
Start: 2019-01-01 | End: 2019-01-01

## 2019-01-01 RX ORDER — OLANZAPINE 5 MG/1
5 TABLET, ORALLY DISINTEGRATING ORAL AT BEDTIME
Status: DISCONTINUED | OUTPATIENT
Start: 2019-01-01 | End: 2019-01-01 | Stop reason: HOSPADM

## 2019-01-01 RX ORDER — WATER 10 ML/10ML
INJECTION INTRAMUSCULAR; INTRAVENOUS; SUBCUTANEOUS
Status: COMPLETED
Start: 2019-01-01 | End: 2019-01-01

## 2019-01-01 RX ORDER — LEVOTHYROXINE SODIUM 25 UG/1
25 TABLET ORAL
Status: DISCONTINUED | OUTPATIENT
Start: 2019-01-01 | End: 2019-01-01

## 2019-01-01 RX ORDER — POTASSIUM CL/LIDO/0.9 % NACL 10MEQ/0.1L
10 INTRAVENOUS SOLUTION, PIGGYBACK (ML) INTRAVENOUS
Status: DISCONTINUED | OUTPATIENT
Start: 2019-01-01 | End: 2019-01-01

## 2019-01-01 RX ORDER — DOXYCYCLINE 100 MG/1
100 CAPSULE ORAL 2 TIMES DAILY
Status: ON HOLD | COMMUNITY
End: 2019-01-01

## 2019-01-01 RX ORDER — HYDROMORPHONE HYDROCHLORIDE 2 MG/1
1 TABLET ORAL
Qty: 30 TABLET | Refills: 0 | Status: SHIPPED | OUTPATIENT
Start: 2019-01-01

## 2019-01-01 RX ORDER — QUETIAPINE FUMARATE 50 MG/1
75 TABLET, FILM COATED ORAL 2 TIMES DAILY
Qty: 60 TABLET | Refills: 0
Start: 2019-01-01 | End: 2019-01-01

## 2019-01-01 RX ORDER — ONDANSETRON 4 MG/1
4 TABLET, ORALLY DISINTEGRATING ORAL EVERY 6 HOURS PRN
Status: DISCONTINUED | OUTPATIENT
Start: 2019-01-01 | End: 2019-01-01 | Stop reason: HOSPADM

## 2019-01-01 RX ORDER — GLYCOPYRROLATE 0.2 MG/ML
.2-.4 INJECTION, SOLUTION INTRAMUSCULAR; INTRAVENOUS EVERY 4 HOURS PRN
Status: DISCONTINUED | OUTPATIENT
Start: 2019-01-01 | End: 2019-01-01 | Stop reason: HOSPADM

## 2019-01-01 RX ORDER — FENTANYL CITRATE 50 UG/ML
25-50 INJECTION, SOLUTION INTRAMUSCULAR; INTRAVENOUS EVERY 5 MIN PRN
Status: DISCONTINUED | OUTPATIENT
Start: 2019-01-01 | End: 2019-01-01

## 2019-01-01 RX ORDER — POTASSIUM CHLORIDE 29.8 MG/ML
20 INJECTION INTRAVENOUS
Status: DISCONTINUED | OUTPATIENT
Start: 2019-01-01 | End: 2019-01-01

## 2019-01-01 RX ORDER — SENNOSIDES 8.6 MG
1-2 TABLET ORAL 2 TIMES DAILY
Status: DISCONTINUED | OUTPATIENT
Start: 2019-01-01 | End: 2019-01-01

## 2019-01-01 RX ORDER — DIGOXIN 125 MCG
125 TABLET ORAL DAILY
Status: DISCONTINUED | OUTPATIENT
Start: 2019-01-01 | End: 2019-01-01

## 2019-01-01 RX ORDER — FENTANYL CITRATE 50 UG/ML
INJECTION, SOLUTION INTRAMUSCULAR; INTRAVENOUS
Status: COMPLETED
Start: 2019-01-01 | End: 2019-01-01

## 2019-01-01 RX ORDER — ASPIRIN 81 MG/1
81 TABLET, CHEWABLE ORAL DAILY
Status: DISCONTINUED | OUTPATIENT
Start: 2019-01-01 | End: 2019-01-01

## 2019-01-01 RX ORDER — ASPIRIN 81 MG/1
81 TABLET ORAL DAILY
Status: DISCONTINUED | OUTPATIENT
Start: 2019-01-01 | End: 2019-01-01

## 2019-01-01 RX ORDER — MECOBALAMIN 5000 MCG
15 TABLET,DISINTEGRATING ORAL DAILY
Qty: 30 CAPSULE | Refills: 3 | Status: SHIPPED | OUTPATIENT
Start: 2019-01-01 | End: 2019-01-01

## 2019-01-01 RX ORDER — METOPROLOL TARTRATE 25 MG/1
25 TABLET, FILM COATED ORAL ONCE
Status: DISCONTINUED | OUTPATIENT
Start: 2019-01-01 | End: 2019-01-01 | Stop reason: ALTCHOICE

## 2019-01-01 RX ORDER — FUROSEMIDE 10 MG/ML
20 INJECTION INTRAMUSCULAR; INTRAVENOUS ONCE
Status: COMPLETED | OUTPATIENT
Start: 2019-01-01 | End: 2019-01-01

## 2019-01-01 RX ORDER — ASPIRIN 81 MG/1
TABLET, CHEWABLE ORAL
Qty: 30 TABLET | Refills: 3 | Status: ON HOLD | OUTPATIENT
Start: 2019-01-01 | End: 2019-01-01

## 2019-01-01 RX ORDER — IOPAMIDOL 755 MG/ML
79 INJECTION, SOLUTION INTRAVASCULAR ONCE
Status: COMPLETED | OUTPATIENT
Start: 2019-01-01 | End: 2019-01-01

## 2019-01-01 RX ORDER — FENTANYL CITRATE 50 UG/ML
25-50 INJECTION, SOLUTION INTRAMUSCULAR; INTRAVENOUS EVERY 5 MIN PRN
Status: DISCONTINUED | OUTPATIENT
Start: 2019-01-01 | End: 2019-01-01 | Stop reason: HOSPADM

## 2019-01-01 RX ORDER — MORPHINE SULFATE 100 MG/5ML
2.5 SOLUTION ORAL
Qty: 30 ML | Refills: 0 | Status: SHIPPED | OUTPATIENT
Start: 2019-01-01 | End: 2019-01-01

## 2019-01-01 RX ORDER — HYDROMORPHONE HYDROCHLORIDE 1 MG/ML
1 SOLUTION ORAL
Qty: 30 ML | Refills: 0 | Status: SHIPPED | DISCHARGE
Start: 2019-01-01 | End: 2019-01-01

## 2019-01-01 RX ORDER — METOPROLOL TARTRATE 25 MG/1
25 TABLET, FILM COATED ORAL ONCE
Status: COMPLETED | OUTPATIENT
Start: 2019-01-01 | End: 2019-01-01

## 2019-01-01 RX ORDER — LEVOFLOXACIN 5 MG/ML
500 INJECTION, SOLUTION INTRAVENOUS
Status: DISCONTINUED | OUTPATIENT
Start: 2019-01-01 | End: 2019-01-01

## 2019-01-01 RX ADMIN — QUETIAPINE 100 MG: 25 TABLET ORAL at 20:54

## 2019-01-01 RX ADMIN — LEVETIRACETAM 750 MG: 100 INJECTION, SOLUTION INTRAVENOUS at 23:21

## 2019-01-01 RX ADMIN — METOPROLOL TARTRATE 75 MG: 50 TABLET, FILM COATED ORAL at 09:30

## 2019-01-01 RX ADMIN — Medication 10 MEQ: at 13:24

## 2019-01-01 RX ADMIN — LEVETIRACETAM 750 MG: 750 TABLET, FILM COATED ORAL at 14:02

## 2019-01-01 RX ADMIN — LEVETIRACETAM 750 MG: 100 INJECTION, SOLUTION INTRAVENOUS at 08:32

## 2019-01-01 RX ADMIN — METOPROLOL SUCCINATE 100 MG: 25 TABLET, EXTENDED RELEASE ORAL at 09:08

## 2019-01-01 RX ADMIN — LEVETIRACETAM 750 MG: 100 INJECTION, SOLUTION INTRAVENOUS at 19:31

## 2019-01-01 RX ADMIN — LEVOTHYROXINE SODIUM 25 MCG: 25 TABLET ORAL at 08:32

## 2019-01-01 RX ADMIN — METOPROLOL TARTRATE 2.5 MG: 5 INJECTION INTRAVENOUS at 09:57

## 2019-01-01 RX ADMIN — METOPROLOL TARTRATE 50 MG: 50 TABLET, FILM COATED ORAL at 06:59

## 2019-01-01 RX ADMIN — LEVETIRACETAM 500 MG: 500 TABLET, FILM COATED ORAL at 08:36

## 2019-01-01 RX ADMIN — OLANZAPINE 5 MG: 5 TABLET, ORALLY DISINTEGRATING ORAL at 22:07

## 2019-01-01 RX ADMIN — METOPROLOL TARTRATE 2.5 MG: 5 INJECTION INTRAVENOUS at 23:16

## 2019-01-01 RX ADMIN — Medication 10 MEQ: at 12:28

## 2019-01-01 RX ADMIN — HYDROMORPHONE HYDROCHLORIDE 1 MG: 1 SOLUTION ORAL at 21:13

## 2019-01-01 RX ADMIN — OMEPRAZOLE 10 MG: 10 CAPSULE, DELAYED RELEASE ORAL at 08:19

## 2019-01-01 RX ADMIN — Medication 10 MEQ: at 02:33

## 2019-01-01 RX ADMIN — METOPROLOL TARTRATE 2.5 MG: 5 INJECTION INTRAVENOUS at 03:52

## 2019-01-01 RX ADMIN — SODIUM CHLORIDE: 9 INJECTION, SOLUTION INTRAVENOUS at 11:00

## 2019-01-01 RX ADMIN — FENTANYL CITRATE 50 MCG: 50 INJECTION, SOLUTION INTRAMUSCULAR; INTRAVENOUS at 11:09

## 2019-01-01 RX ADMIN — Medication 10 MEQ: at 08:12

## 2019-01-01 RX ADMIN — PIPERACILLIN SODIUM AND TAZOBACTAM SODIUM 3.38 G: 3; .375 INJECTION, POWDER, LYOPHILIZED, FOR SOLUTION INTRAVENOUS at 03:53

## 2019-01-01 RX ADMIN — Medication 10 MEQ: at 21:30

## 2019-01-01 RX ADMIN — LEVOFLOXACIN 500 MG: 5 INJECTION, SOLUTION INTRAVENOUS at 00:00

## 2019-01-01 RX ADMIN — LEVETIRACETAM 500 MG: 500 TABLET, FILM COATED ORAL at 20:39

## 2019-01-01 RX ADMIN — METOPROLOL TARTRATE 2.5 MG: 5 INJECTION INTRAVENOUS at 20:48

## 2019-01-01 RX ADMIN — QUETIAPINE 25 MG: 25 TABLET, FILM COATED ORAL at 10:56

## 2019-01-01 RX ADMIN — SODIUM CHLORIDE: 9 INJECTION, SOLUTION INTRAVENOUS at 07:15

## 2019-01-01 RX ADMIN — DILTIAZEM HYDROCHLORIDE 5 MG/HR: 5 INJECTION INTRAVENOUS at 18:34

## 2019-01-01 RX ADMIN — QUETIAPINE 100 MG: 25 TABLET ORAL at 08:18

## 2019-01-01 RX ADMIN — PIPERACILLIN SODIUM AND TAZOBACTAM SODIUM 3.38 G: 3; .375 INJECTION, POWDER, LYOPHILIZED, FOR SOLUTION INTRAVENOUS at 04:50

## 2019-01-01 RX ADMIN — PIPERACILLIN SODIUM AND TAZOBACTAM SODIUM 3.38 G: 3; .375 INJECTION, POWDER, LYOPHILIZED, FOR SOLUTION INTRAVENOUS at 11:51

## 2019-01-01 RX ADMIN — LEVETIRACETAM 750 MG: 750 TABLET, FILM COATED ORAL at 21:19

## 2019-01-01 RX ADMIN — SODIUM CHLORIDE 100 ML: 9 INJECTION, SOLUTION INTRAVENOUS at 17:24

## 2019-01-01 RX ADMIN — METOPROLOL TARTRATE 50 MG: 50 TABLET, FILM COATED ORAL at 20:39

## 2019-01-01 RX ADMIN — LEVETIRACETAM 750 MG: 750 TABLET, FILM COATED ORAL at 09:10

## 2019-01-01 RX ADMIN — Medication 10 MEQ: at 09:50

## 2019-01-01 RX ADMIN — SODIUM CHLORIDE: 9 INJECTION, SOLUTION INTRAVENOUS at 02:46

## 2019-01-01 RX ADMIN — SENNOSIDES 2 TABLET: 8.6 TABLET, FILM COATED ORAL at 21:50

## 2019-01-01 RX ADMIN — LEVETIRACETAM 750 MG: 100 INJECTION, SOLUTION INTRAVENOUS at 09:00

## 2019-01-01 RX ADMIN — HUMAN ALBUMIN MICROSPHERES AND PERFLUTREN 9 ML: 10; .22 INJECTION, SOLUTION INTRAVENOUS at 15:47

## 2019-01-01 RX ADMIN — SODIUM CHLORIDE 20 MG: 9 INJECTION, SOLUTION INTRAVENOUS at 11:52

## 2019-01-01 RX ADMIN — PIPERACILLIN SODIUM AND TAZOBACTAM SODIUM 3.38 G: 3; .375 INJECTION, POWDER, LYOPHILIZED, FOR SOLUTION INTRAVENOUS at 16:51

## 2019-01-01 RX ADMIN — POLYETHYLENE GLYCOL 3350 17 G: 17 POWDER, FOR SOLUTION ORAL at 09:35

## 2019-01-01 RX ADMIN — LEVETIRACETAM 750 MG: 750 TABLET, FILM COATED ORAL at 20:54

## 2019-01-01 RX ADMIN — METOPROLOL TARTRATE 2.5 MG: 5 INJECTION INTRAVENOUS at 21:54

## 2019-01-01 RX ADMIN — DIGOXIN 125 MCG: 0.25 INJECTION INTRAMUSCULAR; INTRAVENOUS at 08:56

## 2019-01-01 RX ADMIN — DIGOXIN 125 MCG: 0.25 INJECTION INTRAMUSCULAR; INTRAVENOUS at 10:51

## 2019-01-01 RX ADMIN — ONDANSETRON 4 MG: 2 INJECTION INTRAMUSCULAR; INTRAVENOUS at 06:21

## 2019-01-01 RX ADMIN — PIPERACILLIN SODIUM AND TAZOBACTAM SODIUM 3.38 G: 3; .375 INJECTION, POWDER, LYOPHILIZED, FOR SOLUTION INTRAVENOUS at 22:51

## 2019-01-01 RX ADMIN — SODIUM CHLORIDE 1000 ML: 9 INJECTION, SOLUTION INTRAVENOUS at 18:07

## 2019-01-01 RX ADMIN — IOPAMIDOL 79 ML: 755 INJECTION, SOLUTION INTRAVENOUS at 12:03

## 2019-01-01 RX ADMIN — PIPERACILLIN SODIUM AND TAZOBACTAM SODIUM 3.38 G: 3; .375 INJECTION, POWDER, LYOPHILIZED, FOR SOLUTION INTRAVENOUS at 12:07

## 2019-01-01 RX ADMIN — METOPROLOL TARTRATE 2.5 MG: 5 INJECTION INTRAVENOUS at 16:51

## 2019-01-01 RX ADMIN — FENTANYL CITRATE 50 MCG: 50 INJECTION INTRAMUSCULAR; INTRAVENOUS at 13:09

## 2019-01-01 RX ADMIN — Medication 10 MEQ: at 01:26

## 2019-01-01 RX ADMIN — METOPROLOL TARTRATE 75 MG: 50 TABLET, FILM COATED ORAL at 21:18

## 2019-01-01 RX ADMIN — QUETIAPINE 100 MG: 25 TABLET ORAL at 20:39

## 2019-01-01 RX ADMIN — QUETIAPINE 100 MG: 25 TABLET ORAL at 09:31

## 2019-01-01 RX ADMIN — DIGOXIN 125 MCG: 0.12 TABLET ORAL at 08:32

## 2019-01-01 RX ADMIN — SODIUM CHLORIDE 20 MG: 9 INJECTION, SOLUTION INTRAVENOUS at 10:11

## 2019-01-01 RX ADMIN — SENNOSIDES 2 TABLET: 8.6 TABLET, FILM COATED ORAL at 10:34

## 2019-01-01 RX ADMIN — PIPERACILLIN SODIUM AND TAZOBACTAM SODIUM 3.38 G: 3; .375 INJECTION, POWDER, LYOPHILIZED, FOR SOLUTION INTRAVENOUS at 05:13

## 2019-01-01 RX ADMIN — QUETIAPINE 25 MG: 25 TABLET, FILM COATED ORAL at 19:26

## 2019-01-01 RX ADMIN — OLANZAPINE 5 MG: 5 TABLET, ORALLY DISINTEGRATING ORAL at 13:38

## 2019-01-01 RX ADMIN — Medication 10 MEQ: at 19:13

## 2019-01-01 RX ADMIN — PIPERACILLIN SODIUM AND TAZOBACTAM SODIUM 3.38 G: 3; .375 INJECTION, POWDER, LYOPHILIZED, FOR SOLUTION INTRAVENOUS at 16:39

## 2019-01-01 RX ADMIN — HYDROMORPHONE HYDROCHLORIDE 1 MG: 1 SOLUTION ORAL at 05:40

## 2019-01-01 RX ADMIN — SODIUM CHLORIDE: 9 INJECTION, SOLUTION INTRAVENOUS at 22:09

## 2019-01-01 RX ADMIN — LEVETIRACETAM 750 MG: 100 SOLUTION ORAL at 22:06

## 2019-01-01 RX ADMIN — PIPERACILLIN SODIUM AND TAZOBACTAM SODIUM 3.38 G: 3; .375 INJECTION, POWDER, LYOPHILIZED, FOR SOLUTION INTRAVENOUS at 00:24

## 2019-01-01 RX ADMIN — METOPROLOL TARTRATE 12.5 MG: 25 TABLET, FILM COATED ORAL at 14:39

## 2019-01-01 RX ADMIN — SODIUM CHLORIDE 20 MG: 9 INJECTION, SOLUTION INTRAVENOUS at 14:07

## 2019-01-01 RX ADMIN — METOPROLOL SUCCINATE 100 MG: 25 TABLET, EXTENDED RELEASE ORAL at 20:54

## 2019-01-01 RX ADMIN — LEVOTHYROXINE SODIUM 25 MCG: 25 TABLET ORAL at 09:29

## 2019-01-01 RX ADMIN — METOPROLOL TARTRATE 37.5 MG: 25 TABLET ORAL at 08:36

## 2019-01-01 RX ADMIN — METOPROLOL TARTRATE 2.5 MG: 5 INJECTION INTRAVENOUS at 10:50

## 2019-01-01 RX ADMIN — PROCHLORPERAZINE EDISYLATE 5 MG: 5 INJECTION, SOLUTION INTRAMUSCULAR; INTRAVENOUS at 06:36

## 2019-01-01 RX ADMIN — VANCOMYCIN HYDROCHLORIDE 2000 MG: 5 INJECTION, POWDER, LYOPHILIZED, FOR SOLUTION INTRAVENOUS at 19:56

## 2019-01-01 RX ADMIN — DEXTROSE MONOHYDRATE: 50 INJECTION, SOLUTION INTRAVENOUS at 18:16

## 2019-01-01 RX ADMIN — DIATRIZOATE MEGLUMINE AND DIATRIZOATE SODIUM 480 ML: 660; 100 SOLUTION ORAL; RECTAL at 11:51

## 2019-01-01 RX ADMIN — OLANZAPINE 5 MG: 5 TABLET, ORALLY DISINTEGRATING ORAL at 17:19

## 2019-01-01 RX ADMIN — QUETIAPINE 100 MG: 25 TABLET ORAL at 21:49

## 2019-01-01 RX ADMIN — PIPERACILLIN SODIUM AND TAZOBACTAM SODIUM 3.38 G: 3; .375 INJECTION, POWDER, LYOPHILIZED, FOR SOLUTION INTRAVENOUS at 00:11

## 2019-01-01 RX ADMIN — LEVOFLOXACIN 500 MG: 5 INJECTION, SOLUTION INTRAVENOUS at 00:18

## 2019-01-01 RX ADMIN — Medication 1 MG: at 00:44

## 2019-01-01 RX ADMIN — METOPROLOL TARTRATE 37.5 MG: 25 TABLET ORAL at 00:43

## 2019-01-01 RX ADMIN — DEXTROSE MONOHYDRATE: 50 INJECTION, SOLUTION INTRAVENOUS at 10:51

## 2019-01-01 RX ADMIN — METOPROLOL TARTRATE 25 MG: 25 TABLET ORAL at 16:02

## 2019-01-01 RX ADMIN — PIPERACILLIN SODIUM AND TAZOBACTAM SODIUM 3.38 G: 3; .375 INJECTION, POWDER, LYOPHILIZED, FOR SOLUTION INTRAVENOUS at 05:48

## 2019-01-01 RX ADMIN — METOPROLOL TARTRATE 100 MG: 100 TABLET, FILM COATED ORAL at 20:43

## 2019-01-01 RX ADMIN — POLYETHYLENE GLYCOL 3350 17 G: 17 POWDER, FOR SOLUTION ORAL at 20:54

## 2019-01-01 RX ADMIN — LEVETIRACETAM 750 MG: 750 TABLET, FILM COATED ORAL at 09:36

## 2019-01-01 RX ADMIN — PIPERACILLIN SODIUM AND TAZOBACTAM SODIUM 3.38 G: 3; .375 INJECTION, POWDER, LYOPHILIZED, FOR SOLUTION INTRAVENOUS at 18:09

## 2019-01-01 RX ADMIN — VANCOMYCIN HYDROCHLORIDE 2000 MG: 5 INJECTION, POWDER, LYOPHILIZED, FOR SOLUTION INTRAVENOUS at 12:16

## 2019-01-01 RX ADMIN — METOPROLOL TARTRATE 2.5 MG: 5 INJECTION INTRAVENOUS at 16:29

## 2019-01-01 RX ADMIN — SODIUM CHLORIDE 1000 ML: 9 INJECTION, SOLUTION INTRAVENOUS at 13:54

## 2019-01-01 RX ADMIN — FENTANYL CITRATE 50 MCG: 50 INJECTION INTRAMUSCULAR; INTRAVENOUS at 12:58

## 2019-01-01 RX ADMIN — Medication 10 MEQ: at 15:57

## 2019-01-01 RX ADMIN — LEVETIRACETAM 750 MG: 100 INJECTION, SOLUTION INTRAVENOUS at 20:51

## 2019-01-01 RX ADMIN — LEVOFLOXACIN 500 MG: 5 INJECTION, SOLUTION INTRAVENOUS at 00:59

## 2019-01-01 RX ADMIN — DIGOXIN 125 MCG: 0.25 INJECTION INTRAMUSCULAR; INTRAVENOUS at 08:11

## 2019-01-01 RX ADMIN — METOPROLOL TARTRATE 2.5 MG: 5 INJECTION INTRAVENOUS at 09:49

## 2019-01-01 RX ADMIN — Medication 10 MEQ: at 13:38

## 2019-01-01 RX ADMIN — PIPERACILLIN SODIUM AND TAZOBACTAM SODIUM 3.38 G: 3; .375 INJECTION, POWDER, LYOPHILIZED, FOR SOLUTION INTRAVENOUS at 16:48

## 2019-01-01 RX ADMIN — ASPIRIN 81 MG 81 MG: 81 TABLET ORAL at 08:36

## 2019-01-01 RX ADMIN — FENTANYL CITRATE 50 MCG: 50 INJECTION, SOLUTION INTRAMUSCULAR; INTRAVENOUS at 11:02

## 2019-01-01 RX ADMIN — LEVETIRACETAM 750 MG: 100 INJECTION, SOLUTION INTRAVENOUS at 13:26

## 2019-01-01 RX ADMIN — HEPARIN SODIUM 10000 UNITS: 10000 INJECTION INTRAVENOUS; SUBCUTANEOUS at 11:16

## 2019-01-01 RX ADMIN — ASPIRIN 81 MG: 81 TABLET, DELAYED RELEASE ORAL at 16:02

## 2019-01-01 RX ADMIN — LEVETIRACETAM 750 MG: 100 INJECTION, SOLUTION INTRAVENOUS at 09:49

## 2019-01-01 RX ADMIN — DEXTROSE MONOHYDRATE: 50 INJECTION, SOLUTION INTRAVENOUS at 16:56

## 2019-01-01 RX ADMIN — QUETIAPINE 100 MG: 25 TABLET ORAL at 21:17

## 2019-01-01 RX ADMIN — SODIUM CHLORIDE: 9 INJECTION, SOLUTION INTRAVENOUS at 19:48

## 2019-01-01 RX ADMIN — LEVETIRACETAM 750 MG: 100 INJECTION, SOLUTION INTRAVENOUS at 20:25

## 2019-01-01 RX ADMIN — LEVETIRACETAM 750 MG: 100 SOLUTION ORAL at 09:08

## 2019-01-01 RX ADMIN — DIGOXIN 125 MCG: 0.12 TABLET ORAL at 09:08

## 2019-01-01 RX ADMIN — PIPERACILLIN AND TAZOBACTAM 4.5 G: 4; .5 INJECTION, POWDER, FOR SOLUTION INTRAVENOUS at 15:57

## 2019-01-01 RX ADMIN — LEVETIRACETAM 750 MG: 750 TABLET, FILM COATED ORAL at 20:43

## 2019-01-01 RX ADMIN — QUETIAPINE 100 MG: 25 TABLET ORAL at 00:43

## 2019-01-01 RX ADMIN — LEVETIRACETAM 750 MG: 100 INJECTION, SOLUTION INTRAVENOUS at 08:35

## 2019-01-01 RX ADMIN — SODIUM CHLORIDE 20 MG: 9 INJECTION, SOLUTION INTRAVENOUS at 13:50

## 2019-01-01 RX ADMIN — METOPROLOL SUCCINATE 100 MG: 25 TABLET, EXTENDED RELEASE ORAL at 08:32

## 2019-01-01 RX ADMIN — IOPAMIDOL 30 ML: 612 INJECTION, SOLUTION INTRAVENOUS at 11:18

## 2019-01-01 RX ADMIN — LEVOFLOXACIN 500 MG: 5 INJECTION, SOLUTION INTRAVENOUS at 23:52

## 2019-01-01 RX ADMIN — OMEPRAZOLE 10 MG: 10 CAPSULE, DELAYED RELEASE ORAL at 08:36

## 2019-01-01 RX ADMIN — FUROSEMIDE 20 MG: 10 INJECTION, SOLUTION INTRAVENOUS at 12:08

## 2019-01-01 RX ADMIN — LIDOCAINE HYDROCHLORIDE 20 ML: 10 INJECTION, SOLUTION EPIDURAL; INFILTRATION; INTRACAUDAL; PERINEURAL at 13:36

## 2019-01-01 RX ADMIN — DEXTROSE MONOHYDRATE: 50 INJECTION, SOLUTION INTRAVENOUS at 03:45

## 2019-01-01 RX ADMIN — SENNOSIDES 2 TABLET: 8.6 TABLET, FILM COATED ORAL at 20:54

## 2019-01-01 RX ADMIN — POTASSIUM CHLORIDE 40 MEQ: 1500 TABLET, EXTENDED RELEASE ORAL at 10:34

## 2019-01-01 RX ADMIN — METOPROLOL TARTRATE 2.5 MG: 5 INJECTION INTRAVENOUS at 05:13

## 2019-01-01 RX ADMIN — DEXTROSE MONOHYDRATE: 50 INJECTION, SOLUTION INTRAVENOUS at 08:54

## 2019-01-01 RX ADMIN — POTASSIUM CHLORIDE 20 MEQ: 1500 TABLET, EXTENDED RELEASE ORAL at 16:23

## 2019-01-01 RX ADMIN — LIDOCAINE HYDROCHLORIDE 10 ML: 10 INJECTION, SOLUTION INFILTRATION; PERINEURAL at 11:09

## 2019-01-01 RX ADMIN — QUETIAPINE 100 MG: 25 TABLET ORAL at 09:06

## 2019-01-01 RX ADMIN — Medication 10 MEQ: at 21:18

## 2019-01-01 RX ADMIN — OLANZAPINE 5 MG: 5 TABLET, ORALLY DISINTEGRATING ORAL at 20:24

## 2019-01-01 RX ADMIN — QUETIAPINE 100 MG: 25 TABLET ORAL at 09:27

## 2019-01-01 RX ADMIN — METOPROLOL TARTRATE 2.5 MG: 5 INJECTION INTRAVENOUS at 04:17

## 2019-01-01 RX ADMIN — METOPROLOL TARTRATE 75 MG: 50 TABLET, FILM COATED ORAL at 21:50

## 2019-01-01 RX ADMIN — METOPROLOL TARTRATE 2.5 MG: 5 INJECTION INTRAVENOUS at 15:53

## 2019-01-01 RX ADMIN — SODIUM CHLORIDE 100 ML: 9 INJECTION, SOLUTION INTRAVENOUS at 12:03

## 2019-01-01 RX ADMIN — LEVOTHYROXINE SODIUM 25 MCG: 25 TABLET ORAL at 09:09

## 2019-01-01 RX ADMIN — LEVETIRACETAM 500 MG: 500 TABLET, FILM COATED ORAL at 08:19

## 2019-01-01 RX ADMIN — QUETIAPINE 100 MG: 25 TABLET ORAL at 08:32

## 2019-01-01 RX ADMIN — VANCOMYCIN HYDROCHLORIDE 2000 MG: 5 INJECTION, POWDER, LYOPHILIZED, FOR SOLUTION INTRAVENOUS at 09:07

## 2019-01-01 RX ADMIN — Medication 10 MEQ: at 17:29

## 2019-01-01 RX ADMIN — POLYETHYLENE GLYCOL 3350 17 G: 17 POWDER, FOR SOLUTION ORAL at 10:34

## 2019-01-01 RX ADMIN — METOPROLOL TARTRATE 2.5 MG: 5 INJECTION INTRAVENOUS at 04:49

## 2019-01-01 RX ADMIN — OLANZAPINE 5 MG: 5 TABLET, ORALLY DISINTEGRATING ORAL at 06:23

## 2019-01-01 RX ADMIN — PIPERACILLIN SODIUM AND TAZOBACTAM SODIUM 3.38 G: 3; .375 INJECTION, POWDER, LYOPHILIZED, FOR SOLUTION INTRAVENOUS at 10:48

## 2019-01-01 RX ADMIN — ACETAMINOPHEN 650 MG: 325 TABLET, FILM COATED ORAL at 19:30

## 2019-01-01 RX ADMIN — METOPROLOL TARTRATE 2.5 MG: 5 INJECTION INTRAVENOUS at 22:39

## 2019-01-01 RX ADMIN — OLANZAPINE 5 MG: 10 INJECTION, POWDER, FOR SOLUTION INTRAMUSCULAR at 16:34

## 2019-01-01 RX ADMIN — Medication 10 MEQ: at 22:46

## 2019-01-01 RX ADMIN — OLANZAPINE 5 MG: 5 TABLET, ORALLY DISINTEGRATING ORAL at 21:25

## 2019-01-01 RX ADMIN — LEVETIRACETAM 750 MG: 100 INJECTION, SOLUTION INTRAVENOUS at 21:25

## 2019-01-01 RX ADMIN — PIPERACILLIN SODIUM AND TAZOBACTAM SODIUM 3.38 G: 3; .375 INJECTION, POWDER, LYOPHILIZED, FOR SOLUTION INTRAVENOUS at 12:26

## 2019-01-01 RX ADMIN — LEVOFLOXACIN 750 MG: 5 INJECTION, SOLUTION INTRAVENOUS at 21:42

## 2019-01-01 RX ADMIN — ASPIRIN 81 MG: 81 TABLET, DELAYED RELEASE ORAL at 09:30

## 2019-01-01 RX ADMIN — DIGOXIN 125 MCG: 0.25 INJECTION INTRAMUSCULAR; INTRAVENOUS at 09:47

## 2019-01-01 RX ADMIN — Medication 10 MEQ: at 21:47

## 2019-01-01 RX ADMIN — DIGOXIN 125 MCG: 0.25 INJECTION INTRAMUSCULAR; INTRAVENOUS at 22:46

## 2019-01-01 RX ADMIN — DEXTROSE MONOHYDRATE: 50 INJECTION, SOLUTION INTRAVENOUS at 01:23

## 2019-01-01 RX ADMIN — POLYETHYLENE GLYCOL 3350 17 G: 17 POWDER, FOR SOLUTION ORAL at 08:27

## 2019-01-01 RX ADMIN — LEVOTHYROXINE SODIUM 25 MCG: 25 TABLET ORAL at 08:36

## 2019-01-01 RX ADMIN — LEVOTHYROXINE SODIUM 25 MCG: 25 TABLET ORAL at 08:19

## 2019-01-01 RX ADMIN — QUETIAPINE 100 MG: 25 TABLET ORAL at 08:36

## 2019-01-01 RX ADMIN — QUETIAPINE 100 MG: 25 TABLET ORAL at 20:42

## 2019-01-01 RX ADMIN — LEVETIRACETAM 750 MG: 100 INJECTION, SOLUTION INTRAVENOUS at 13:25

## 2019-01-01 RX ADMIN — ASPIRIN 81 MG: 81 TABLET, DELAYED RELEASE ORAL at 08:32

## 2019-01-01 RX ADMIN — ASPIRIN 81 MG: 81 TABLET, DELAYED RELEASE ORAL at 09:09

## 2019-01-01 RX ADMIN — METOPROLOL TARTRATE 2.5 MG: 5 INJECTION INTRAVENOUS at 01:03

## 2019-01-01 RX ADMIN — POLYETHYLENE GLYCOL 3350 17 G: 17 POWDER, FOR SOLUTION ORAL at 21:49

## 2019-01-01 RX ADMIN — WATER 10 ML: 1 INJECTION INTRAMUSCULAR; INTRAVENOUS; SUBCUTANEOUS at 16:34

## 2019-01-01 RX ADMIN — LEVETIRACETAM 500 MG: 500 TABLET, FILM COATED ORAL at 21:50

## 2019-01-01 RX ADMIN — DILTIAZEM HYDROCHLORIDE 20 MG: 5 INJECTION INTRAVENOUS at 17:58

## 2019-01-01 ASSESSMENT — ACTIVITIES OF DAILY LIVING (ADL)
ADLS_ACUITY_SCORE: 34
ADLS_ACUITY_SCORE: 33
ADLS_ACUITY_SCORE: 32
ADLS_ACUITY_SCORE: 30
ADLS_ACUITY_SCORE: 34
ADLS_ACUITY_SCORE: 36
ADLS_ACUITY_SCORE: 21
ADLS_ACUITY_SCORE: 27
ADLS_ACUITY_SCORE: 21
ADLS_ACUITY_SCORE: 20
ADLS_ACUITY_SCORE: 30
ADLS_ACUITY_SCORE: 21
ADLS_ACUITY_SCORE: 21
ADLS_ACUITY_SCORE: 33
ADLS_ACUITY_SCORE: 30
ADLS_ACUITY_SCORE: 28
ADLS_ACUITY_SCORE: 36
ADLS_ACUITY_SCORE: 21
ADLS_ACUITY_SCORE: 30
ADLS_ACUITY_SCORE: 32
ADLS_ACUITY_SCORE: 34
ADLS_ACUITY_SCORE: 34
ADLS_ACUITY_SCORE: 28
ADLS_ACUITY_SCORE: 21
ADLS_ACUITY_SCORE: 30
ADLS_ACUITY_SCORE: 30
ADLS_ACUITY_SCORE: 36
ADLS_ACUITY_SCORE: 21
ADLS_ACUITY_SCORE: 32
ADLS_ACUITY_SCORE: 36
ADLS_ACUITY_SCORE: 21
ADLS_ACUITY_SCORE: 21
ADLS_ACUITY_SCORE: 18
ADLS_ACUITY_SCORE: 36
ADLS_ACUITY_SCORE: 20
ADLS_ACUITY_SCORE: 21
ADLS_ACUITY_SCORE: 30
ADLS_ACUITY_SCORE: 36
ADLS_ACUITY_SCORE: 21
ADLS_ACUITY_SCORE: 20
ADLS_ACUITY_SCORE: 36
ADLS_ACUITY_SCORE: 31
ADLS_ACUITY_SCORE: 36
ADLS_ACUITY_SCORE: 36
ADLS_ACUITY_SCORE: 20
ADLS_ACUITY_SCORE: 34
ADLS_ACUITY_SCORE: 36
ADLS_ACUITY_SCORE: 36
ADLS_ACUITY_SCORE: 30
ADLS_ACUITY_SCORE: 36
ADLS_ACUITY_SCORE: 32
ADLS_ACUITY_SCORE: 34
ADLS_ACUITY_SCORE: 34
ADLS_ACUITY_SCORE: 27
ADLS_ACUITY_SCORE: 30
ADLS_ACUITY_SCORE: 36
ADLS_ACUITY_SCORE: 32
ADLS_ACUITY_SCORE: 36
ADLS_ACUITY_SCORE: 34

## 2019-01-01 ASSESSMENT — ENCOUNTER SYMPTOMS
ABDOMINAL PAIN: 0
AGITATION: 1
HEADACHES: 1
COUGH: 1
WEAKNESS: 1

## 2019-01-01 ASSESSMENT — MIFFLIN-ST. JEOR: SCORE: 1488.27

## 2019-06-18 NOTE — LETTER
6/18/2019        RE: Sintia Connors  [facility]  [address] [address]        [place] [facility]  [place] Medical Record Number:  [id number]  Place of Service where encounter took place:  [facility] ([facility] [[id number]]  Chief Complaint   Patient presents with     RECHECK       HPI:    Sintia Connors  is a 83 year old (1935), who is being seen today for an episodic care visit.  HPI information obtained from: facility staff and patient report. Today's concern is:    1. Severe episode of recurrent major depressive disorder, with psychotic features (H)    2. Seizures (H)    3. Primary osteoarthritis involving multiple joints    4. Paranoia (H)    5. History of CVA (cerebrovascular accident)      Came to see Sintia today due to concern from nursing that she has been showing a lot of anger, negative comments like wanting to burn the place down.  Discussed bumping up her Seroquel.  Have done it in the past and has worked.  Sintia did open the door and right away she stated she was not available and then acted like she has not seen me in a long time.  Finally she let this NP come in when asked if blood pressure could be done.      Past Medical and Surgical History reviewed in Epic today.    MEDICATIONS:  Current Outpatient Medications   Medication Sig Dispense Refill     acetaminophen (TYLENOL) 500 MG tablet Take 500 mg by mouth every 8 hours as needed for mild pain       aspirin (ASA) 81 MG chewable tablet CHEW 1 TABLET BY MOUTH DAILY 30 tablet 3     ASPIRIN PO Take 81 mg by mouth daily       calcium carbonate (TUMS) 500 MG chewable tablet Take 1 chew tab by mouth every 8 hours as needed for heartburn  90 tablet      diclofenac (VOLTAREN) 1 % GEL APPLY 4 GRAMS TO KNEES OR 2 GRAMS TO HANDS FOUR TIMES DAILY USING ENCLOSED DOSING CARD. 100 g 3     glucosamine-chondroitin 500-400 MG CAPS per capsule Take 1 capsule by mouth daily 270 capsule 3     LANsoprazole  (PREVACID) 15 MG DR capsule Take 1 capsule (15 mg) by mouth daily 30 capsule 3     LANsoprazole (PREVACID) 15 MG DR capsule TAKE 1 CAPSULE BY MOUTH ONCE DAILY 30 capsule 3     levETIRAcetam (KEPPRA) 750 MG tablet TAKE 1 TABLET BY MOUTH TWO TIMES A DAY 60 tablet 3     metoprolol tartrate (LOPRESSOR) 25 MG tablet Take 1.5 tablets (37.5 mg) by mouth 2 times daily 270 tablet 3     multivitamin, therapeutic with minerals (MULTI-VITAMIN) TABS Take 1 tablet by mouth daily.       omeprazole (PRILOSEC) 10 MG DR capsule TAKE 1 CAPSULE BY MOUTH ONCE DAILY 30 capsule 3     ORDER FOR DME Equipment being ordered: Shower Chair 1 each 0     ORDER FOR DME Equipment being ordered: Bath transfer bench  Fax to:[phone] Device 0     ORDER FOR DME Equipment being ordered: transfer bath bench 1 Device 0     QUEtiapine (SEROQUEL) 50 MG tablet TAKE 1 TABLET BY MOUTH TWO TIMES A DAY 60 tablet 3     QUEtiapine Fumarate (SEROQUEL PO) Take 50 mg by mouth 2 times daily       vitamin B-Complex TAKE 1 TABLET BY MOUTH ONCE DAILY 30 tablet 3     VITAMIN D, CHOLECALCIFEROL, PO Take 2,000 Units by mouth daily         REVIEW OF SYSTEMS:  4 point ROS including Respiratory, CV, GI and , other than that noted in the HPI,  is negative    Objective:  /78   Pulse 84   Temp 98  F (36.7  C)   SpO2 96%   Exam:  Continued to talk quickly and about the same subjects. - claimed to have a party in her room with loud music.  Everyone told her how great she was at hosting the party.  Talked about her son nicely and not negative like she did with the staff.  Did not mention she was going to burn the place down.  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      Heart rate regular and strong  No edema.  Lungs are clear.  Ambulates with a walker (4wheel) and will sit on her w/c.    No inflamed joints seen.  Skin is pink, dry and warm.    Blood pressures range from 120-140's/70-80's    Labs:   N/A      ASSESSMENT/PLAN:  1. Seizures (H)    2.  Primary osteoarthritis involving multiple joints    3. Severe episode of recurrent major depressive disorder, with psychotic features (H)    4. Paranoia (H)    5. History of CVA (cerebrovascular accident)      No recent seizures reported or witnessed.  No changes with her medications.  Continues to be able to move around and denied pain with her joints.    With the hx of her CVA feel this is a contributing factor to her personality.  Discussed increasing her to 75mg twice a day and question if she would notice an extra pill most likely.   Will start it at her next med set up.  Staff does not think she abscesses about her medications like she use too.     Orders written by provider at facility  1.  Increase Seroquel to 75mg twice a day - script given to pharmacy.      Electronically signed by:  RONY Patino CNP               Sincerely,        RONY Patino CNP

## 2019-06-18 NOTE — PROGRESS NOTES
[place] [facility]  [place] Medical Record Number:  [id number]  [id number]  Chief Complaint   Patient presents with     RECHECK       HPI:    Sintia Connors  is a 83 year old (1935), who is being seen today for an episodic care visit.  HPI information obtained from: facility staff and patient report. Today's concern is:    1. Severe episode of recurrent major depressive disorder, with psychotic features (H)    2. Seizures (H)    3. Primary osteoarthritis involving multiple joints    4. Paranoia (H)    5. History of CVA (cerebrovascular accident)      Came to see Sintia today due to concern from nursing that she has been showing a lot of anger, negative comments like wanting to burn the place down.  Discussed bumping up her Seroquel.  Have done it in the past and has worked.  Sintia did open the door and right away she stated she was not available and then acted like she has not seen me in a long time.  Finally she let this NP come in when asked if blood pressure could be done.      Past Medical and Surgical History reviewed in Epic today.    MEDICATIONS:  Current Outpatient Medications   Medication Sig Dispense Refill     acetaminophen (TYLENOL) 500 MG tablet Take 500 mg by mouth every 8 hours as needed for mild pain       aspirin (ASA) 81 MG chewable tablet CHEW 1 TABLET BY MOUTH DAILY 30 tablet 3     ASPIRIN PO Take 81 mg by mouth daily       calcium carbonate (TUMS) 500 MG chewable tablet Take 1 chew tab by mouth every 8 hours as needed for heartburn  90 tablet      diclofenac (VOLTAREN) 1 % GEL APPLY 4 GRAMS TO KNEES OR 2 GRAMS TO HANDS FOUR TIMES DAILY USING ENCLOSED DOSING CARD. 100 g 3     glucosamine-chondroitin 500-400 MG CAPS per capsule Take 1 capsule by mouth daily 270 capsule 3     LANsoprazole (PREVACID) 15 MG DR capsule Take 1 capsule (15 mg) by mouth daily 30 capsule 3     LANsoprazole (PREVACID) 15 MG  DR capsule TAKE 1 CAPSULE BY MOUTH ONCE DAILY 30 capsule 3     levETIRAcetam (KEPPRA) 750 MG tablet TAKE 1 TABLET BY MOUTH TWO TIMES A DAY 60 tablet 3     metoprolol tartrate (LOPRESSOR) 25 MG tablet Take 1.5 tablets (37.5 mg) by mouth 2 times daily 270 tablet 3     multivitamin, therapeutic with minerals (MULTI-VITAMIN) TABS Take 1 tablet by mouth daily.       omeprazole (PRILOSEC) 10 MG DR capsule TAKE 1 CAPSULE BY MOUTH ONCE DAILY 30 capsule 3     ORDER FOR DME Equipment being ordered: Shower Chair 1 each 0     ORDER FOR DME Equipment being ordered: Bath transfer bench  Fax to:[phone] Device 0     ORDER FOR DME Equipment being ordered: transfer bath bench 1 Device 0     QUEtiapine (SEROQUEL) 50 MG tablet TAKE 1 TABLET BY MOUTH TWO TIMES A DAY 60 tablet 3     QUEtiapine Fumarate (SEROQUEL PO) Take 50 mg by mouth 2 times daily       vitamin B-Complex TAKE 1 TABLET BY MOUTH ONCE DAILY 30 tablet 3     VITAMIN D, CHOLECALCIFEROL, PO Take 2,000 Units by mouth daily         REVIEW OF SYSTEMS:  4 point ROS including Respiratory, CV, GI and , other than that noted in the HPI,  is negative    Objective:  /78   Pulse 84   Temp 98  F (36.7  C)   SpO2 96%   Exam:  Continued to talk quickly and about the same subjects. - claimed to have a party in her room with loud music.  Everyone told her how great she was at hosting the party.  Talked about her son nicely and not negative like she did with the staff.  Did not mention she was going to burn the place down.  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      Heart rate regular and strong  No edema.  Lungs are clear.  Ambulates with a walker (4wheel) and will sit on her w/c.    No inflamed joints seen.  Skin is pink, dry and warm.    Blood pressures range from 120-140's/70-80's    Labs:   N/A      ASSESSMENT/PLAN:  1. Seizures (H)    2. Primary osteoarthritis involving multiple joints    3. Severe episode of recurrent major depressive disorder, with  psychotic features (H)    4. Paranoia (H)    5. History of CVA (cerebrovascular accident)      No recent seizures reported or witnessed.  No changes with her medications.  Continues to be able to move around and denied pain with her joints.    With the hx of her CVA feel this is a contributing factor to her personality.  Discussed increasing her to 75mg twice a day and question if she would notice an extra pill most likely.   Will start it at her next med set up.  Staff does not think she abscesses about her medications like she use too.     Orders written by provider at facility  1.  Increase Seroquel to 75mg twice a day - script given to pharmacy.      Electronically signed by:  RONY Patino CNP

## 2019-08-14 NOTE — LETTER
8/14/2019        RE: Sintia Connors  [facility]  [address] [facility] [address]        [place]  Chief Complaint   Patient presents with     Annual Comprehensive Exam Assisted Living     [place] Medical Record Number:  [id number]  Place of Service where encounter took place:  [facility] ([facility] [[id number]]    HPI:    Sintia Connors  is a 84 year old  (1935), who is being seen today for an annual comprehensive visit. HPI information obtained from: facility staff, patient report and [facility] chart review.  Today's concerns are:    1. Seizures (H)    2. Paroxysmal atrial fibrillation (H)    3. 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 Senile osteoporosis    5. Rheumatoid arthritis involving multiple sites with positive rheumatoid factor (H)    6. Gastroesophageal reflux disease without esophagitis    7. Paranoia (H)    8. Primary insomnia      - came to see Sintia today as she was in her apartment.  Once she recognized this NP, her voice got loud and starts to say that she has prayed for me at [employer] this morning.  This is part of her paranoia    Based on JNC-8 goals,  patients age of 84 year old, no presence of diabetes or CKD, and goals of care goal BP is  <140/90 mm Hg. Patient is stable with current plan of care and routine assessment..      ALLERGIES: Lorazepam  PAST MEDICAL HISTORY:  has a past medical history of Atrial flutter (H) (4/12/15), Cerebral infarction (H), GERD (gastroesophageal reflux disease), PVC (premature ventricular contraction), Rheumatoid arthritis (H), S/P lumpectomy of breast, Seizures (H), and Senile osteoporosis.  PAST SURGICAL HISTORY:  has a past surgical history that includes hernia repair; hernia repair; Hysterectomy total abdominal, bilateral salpingo-oophorectomy, combined; appendectomy; Athroplasty right knee (4/2015); and TOTAL KNEE ARTHROPLASTY (Left,  09/2015).  IMMUNIZATIONS:  Immunization History   Administered Date(s) Administered     Pneumo Conj 13-V (2010&after) 05/12/2015     Pneumococcal 23 valent 07/30/2013     TD (ADULT, 7+) 09/23/2000     TDAP Vaccine (Adacel) 08/26/2013     Twinrix A/B 03/21/2005, 04/25/2005, 10/18/2005     Above immunizations pulled from [facility]. MIIC and facility records also reconciled. Outstanding information sent to  to update [facility].  Future immunizations are not needed at this point as all recommended immunizations are up to date.     Current Outpatient Medications   Medication Sig Dispense Refill     acetaminophen (TYLENOL) 500 MG tablet Take 500 mg by mouth every 8 hours as needed for mild pain       aspirin (ASA) 81 MG chewable tablet CHEW 1 TABLET BY MOUTH DAILY 30 tablet 3     ASPIRIN PO Take 81 mg by mouth daily       calcium carbonate (TUMS) 500 MG chewable tablet Take 1 chew tab by mouth every 8 hours as needed for heartburn  90 tablet      diclofenac (VOLTAREN) 1 % GEL APPLY 4 GRAMS TO KNEES OR 2 GRAMS TO HANDS FOUR TIMES DAILY USING ENCLOSED DOSING CARD. 100 g 3     glucosamine-chondroitin 500-400 MG CAPS per capsule Take 1 capsule by mouth daily 270 capsule 3     LANsoprazole (PREVACID) 15 MG DR capsule Take 1 capsule (15 mg) by mouth daily 30 capsule 3     levETIRAcetam (KEPPRA) 750 MG tablet TAKE 1 TABLET BY MOUTH TWO TIMES A DAY 60 tablet 3     metoprolol tartrate (LOPRESSOR) 25 MG tablet Take 1.5 tablets (37.5 mg) by mouth 2 times daily 270 tablet 3     multivitamin, therapeutic with minerals (MULTI-VITAMIN) TABS Take 1 tablet by mouth daily.       omeprazole (PRILOSEC) 10 MG DR capsule TAKE ONE CAPSULE BY MOUTH DAILY 30 capsule 3     ORDER FOR DME Equipment being ordered: Shower Chair 1 each 0     ORDER FOR DME Equipment being ordered: Bath transfer bench  Fax to:[phone] Device 0     ORDER FOR DME Equipment being ordered: transfer bath bench 1 Device 0     QUEtiapine (SEROQUEL)  "50 MG tablet Take 1.5 tablets (75 mg) by mouth 2 times daily 60 tablet 0     vitamin B-Complex TAKE 1 TABLET BY MOUTH ONCE DAILY 30 tablet 3     VITAMIN D, CHOLECALCIFEROL, PO Take 2,000 Units by mouth daily         Case Management:  I have reviewed the Assisted Living care plan, current immunizations and preventive care/cancer screening. .Future cancer screening is not clinically indicated secondary to age/goals of care Patient's desire to return to the community is not present. Current Level of Care is appropriate to a degree.  Facility is attempting to talk with her ACP  about her behaviors and if a better place for her to be living.    Advance Directive Discussion:    I reviewed the current advanced directives as reflected in EPIC, the POLST and the facility chart, and verified the congruency of orders. I contacted the first party son and left a message regarding the plan of Care.  I did not due to cognitive impairment review the advance directives with the resident.     Team Discussion:  I communicated with the appropriate disciplines involved with the Plan of Care:   Nursing    Patient's goal is live to the fullest  .  Information reviewed:  Medications, vital signs, orders, and nursing notes.    ROS  10 point ROS of systems including Constitutional, Eyes, Respiratory, Cardiovascular, Gastroenterology, Genitourinary, Integumentary, Musculoskeletal, Psychiatric were all negative except for pertinent positives noted in my HPI.    Vitals:  /84   Pulse 90   Temp 97.8  F (36.6  C)   Resp 18   Ht 1.575 m (5' 2\")   Wt 107.5 kg (237 lb)   BMI 43.35 kg/m    Body mass index is 43.35 kg/m .  Exam:  GENERAL APPEARANCE:  Alert, morbidly obese, cooperative, voice is raised and talking about the same story  EYES:  PERRL, Conjunctiva and lids normal  NECK:  No adenopathy,masses or thyromegaly  RESP:  respiratory effort and palpation of chest normal, lungs clear to auscultation , no respiratory " distress  CV:  Palpation and auscultation of heart done , regular rate and rhythm, no murmur, rub, or gallop, no edema  ABDOMEN:  normal bowel sounds, soft, nontender, no hepatosplenomegaly or other masses, no guarding or rebound  M/S:   Gait and station abnormal uses a walker 4 wheel for ambulation.  she is able to sit on it as well for the seat.    SKIN:  Inspection of skin and subcutaneous tissue baseline, Palpation of skin and subcutaneous tissue baseline  NEURO:   Cranial nerves 2-12 are normal tested and grossly at patient's baseline, Examination of sensation by touch normal, hand grasps equal, some word finding issues  PSYCH:  oriented to person and place with time vague at times, insight and judgement impaired, thought content is bizarre, grand thoughts that she runs the place, will talk loud and nasty about people that they are scared of her..  today she talked about having a stroke last week and 4 doctors came over to check her out.       Lab/Diagnostic data:   No recent labs, most often she will not allow    ASSESSMENT/PLAN  Paroxysmal atrial fibrillation (H)  Is on metoprolol 37.5mg twice a day.  Does not have a HTN diagnoses but blood pressures range from 110-140's/70-80's.  Most are within limits.    No complaints of a racing heart.  Today she was in rhythm.      Seizures (H)  No witnessed seizures.  Remains on Keppra 750mg twice a day.  No changes.    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  No new neurological symptoms.      Senile osteoporosis  Rheumatoid arthritis involving multiple sites with positive rheumatoid factor (H)  - currently on glucosamine-chondroitin 500-400 daily.  Has an order for Voltaren 1% gell four times a day.  Does not complain of pain and uses her hand to talk as well.  No recent falls and uses a walker for ambulation.    Gastroesophageal reflux disease without esophagitis  No changes with her prevacid 15mg daily.       Paranoia (H)  Primary insomnia  - this is her main problem.  This NP will be in the office and she will call down to the nurses and could keep them on the phone for some time or in the apartment as well.  Sometimes will go in twos in order to stay on task and get out of apartment.    Grand ideas and the stories are enormous.  Have increased her Seroquel in the past.  Nursing is trying to communicate with ACP about her behaviors.      Remains on Seroquel 75mg twice a day.    Would prefer that ACP help with her medications.      No new orders.  Medications reviewed.  Ongoing discussion about her behaviors with staff regularly.  Continue to keep safe in her own environment and redirect when not appropriate.     Electronically signed by:  RONY Patino CNP             Sincerely,        RONY Patino CNP

## 2019-08-14 NOTE — PROGRESS NOTES
[facility]  Chief Complaint   Patient presents with     Annual Comprehensive Exam Assisted Living     [place] Medical Record Number:  [id number]  Place of Service where encounter took place[facility] [[id number]]    HPI:    Sintia Connors  is a 84 year old  (1935), who is being seen today for an annual comprehensive visit. HPI information obtained from: facility staff, patient report and [facility] chart review.  Today's concerns are:    1. Seizures (H)    2. Paroxysmal atrial fibrillation (H)    3. 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 Senile osteoporosis    5. Rheumatoid arthritis involving multiple sites with positive rheumatoid factor (H)    6. Gastroesophageal reflux disease without esophagitis    7. Paranoia (H)    8. Primary insomnia      - came to see Sintia today as she was in her apartment.  Once she recognized this NP, her voice got loud and starts to say that she has prayed for me at [facility] this morning.  This is part of her paranoia    Based on JNC-8 goals,  patients age of 84 year old, no presence of diabetes or CKD, and goals of care goal BP is  <140/90 mm Hg. Patient is stable with current plan of care and routine assessment..      ALLERGIES: Lorazepam  PAST MEDICAL HISTORY:  has a past medical history of Atrial flutter (H) (4/12/15), Cerebral infarction (H), GERD (gastroesophageal reflux disease), PVC (premature ventricular contraction), Rheumatoid arthritis (H), S/P lumpectomy of breast, Seizures (H), and Senile osteoporosis.  PAST SURGICAL HISTORY:  has a past surgical history that includes hernia repair; hernia repair; Hysterectomy total abdominal, bilateral salpingo-oophorectomy, combined; appendectomy; Athroplasty right knee (4/2015); and TOTAL KNEE ARTHROPLASTY (Left, 09/2015).  IMMUNIZATIONS:  Immunization History   Administered Date(s) Administered     Pneumo Conj 13-V (2010&after) 05/12/2015     Pneumococcal  23 valent 07/30/2013     TD (ADULT, 7+) 09/23/2000     TDAP Vaccine (Adacel) 08/26/2013     Twinrix A/B 03/21/2005, 04/25/2005, 10/18/2005     Above immunizations pulled from [facility]. MIIC and facility records also reconciled. Outstanding information sent to  to update [facility].  Future immunizations are not needed at this point as all recommended immunizations are up to date.     Current Outpatient Medications   Medication Sig Dispense Refill     acetaminophen (TYLENOL) 500 MG tablet Take 500 mg by mouth every 8 hours as needed for mild pain       aspirin (ASA) 81 MG chewable tablet CHEW 1 TABLET BY MOUTH DAILY 30 tablet 3     ASPIRIN PO Take 81 mg by mouth daily       calcium carbonate (TUMS) 500 MG chewable tablet Take 1 chew tab by mouth every 8 hours as needed for heartburn  90 tablet      diclofenac (VOLTAREN) 1 % GEL APPLY 4 GRAMS TO KNEES OR 2 GRAMS TO HANDS FOUR TIMES DAILY USING ENCLOSED DOSING CARD. 100 g 3     glucosamine-chondroitin 500-400 MG CAPS per capsule Take 1 capsule by mouth daily 270 capsule 3     LANsoprazole (PREVACID) 15 MG DR capsule Take 1 capsule (15 mg) by mouth daily 30 capsule 3     levETIRAcetam (KEPPRA) 750 MG tablet TAKE 1 TABLET BY MOUTH TWO TIMES A DAY 60 tablet 3     metoprolol tartrate (LOPRESSOR) 25 MG tablet Take 1.5 tablets (37.5 mg) by mouth 2 times daily 270 tablet 3     multivitamin, therapeutic with minerals (MULTI-VITAMIN) TABS Take 1 tablet by mouth daily.       omeprazole (PRILOSEC) 10 MG DR capsule TAKE ONE CAPSULE BY MOUTH DAILY 30 capsule 3     ORDER FOR DME Equipment being ordered: Shower Chair 1 each 0     ORDER FOR DME Equipment being ordered: Bath transfer bench  Fax to:[phone] Device 0     ORDER FOR DME Equipment being ordered: transfer bath bench 1 Device 0     QUEtiapine (SEROQUEL) 50 MG tablet Take 1.5 tablets (75 mg) by mouth 2 times daily 60 tablet 0     vitamin B-Complex TAKE 1 TABLET BY MOUTH ONCE DAILY 30 tablet 3      "VITAMIN D, CHOLECALCIFEROL, PO Take 2,000 Units by mouth daily         Case Management:  I have reviewed the Assisted Living care plan, current immunizations and preventive care/cancer screening. .Future cancer screening is not clinically indicated secondary to age/goals of care Patient's desire to return to the community is not present. Current Level of Care is appropriate to a degree.  Facility is attempting to talk with her ACP  about her behaviors and if a better place for her to be living.    Advance Directive Discussion:    I reviewed the current advanced directives as reflected in EPIC, the POLST and the facility chart, and verified the congruency of orders. I contacted the first party son and left a message regarding the plan of Care.  I did not due to cognitive impairment review the advance directives with the resident.     Team Discussion:  I communicated with the appropriate disciplines involved with the Plan of Care:   Nursing    Patient's goal is live to the fullest  .  Information reviewed:  Medications, vital signs, orders, and nursing notes.    ROS  10 point ROS of systems including Constitutional, Eyes, Respiratory, Cardiovascular, Gastroenterology, Genitourinary, Integumentary, Musculoskeletal, Psychiatric were all negative except for pertinent positives noted in my HPI.    Vitals:  /84   Pulse 90   Temp 97.8  F (36.6  C)   Resp 18   Ht 1.575 m (5' 2\")   Wt 107.5 kg (237 lb)   BMI 43.35 kg/m   Body mass index is 43.35 kg/m .  Exam:  GENERAL APPEARANCE:  Alert, morbidly obese, cooperative, voice is raised and talking about the same story  EYES:  PERRL, Conjunctiva and lids normal  NECK:  No adenopathy,masses or thyromegaly  RESP:  respiratory effort and palpation of chest normal, lungs clear to auscultation , no respiratory distress  CV:  Palpation and auscultation of heart done , regular rate and rhythm, no murmur, rub, or gallop, no edema  ABDOMEN:  normal bowel sounds, " soft, nontender, no hepatosplenomegaly or other masses, no guarding or rebound  M/S:   Gait and station abnormal uses a walker 4 wheel for ambulation.  she is able to sit on it as well for the seat.    SKIN:  Inspection of skin and subcutaneous tissue baseline, Palpation of skin and subcutaneous tissue baseline  NEURO:   Cranial nerves 2-12 are normal tested and grossly at patient's baseline, Examination of sensation by touch normal, hand grasps equal, some word finding issues  PSYCH:  oriented to person and place with time vague at times, insight and judgement impaired, thought content is bizarre, grand thoughts that she runs the place, will talk loud and nasty about people that they are scared of her..  today she talked about having a stroke last week and 4 doctors came over to check her out.       Lab/Diagnostic data:   No recent labs, most often she will not allow    ASSESSMENT/PLAN  Paroxysmal atrial fibrillation (H)  Is on metoprolol 37.5mg twice a day.  Does not have a HTN diagnoses but blood pressures range from 110-140's/70-80's.  Most are within limits.    No complaints of a racing heart.  Today she was in rhythm.      Seizures (H)  No witnessed seizures.  Remains on Keppra 750mg twice a day.  No changes.    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  No new neurological symptoms.      Senile osteoporosis  Rheumatoid arthritis involving multiple sites with positive rheumatoid factor (H)  - currently on glucosamine-chondroitin 500-400 daily.  Has an order for Voltaren 1% gell four times a day.  Does not complain of pain and uses her hand to talk as well.  No recent falls and uses a walker for ambulation.    Gastroesophageal reflux disease without esophagitis  No changes with her prevacid 15mg daily.      Paranoia (H)  Primary insomnia  - this is her main problem.  This NP will be in the office and she will call down to the nurses and could keep them on the  phone for some time or in the apartment as well.  Sometimes will go in twos in order to stay on task and get out of apartment.    Grand ideas and the stories are enormous.  Have increased her Seroquel in the past.  Nursing is trying to communicate with ACP about her behaviors.      Remains on Seroquel 75mg twice a day.    Would prefer that ACP help with her medications.      No new orders.  Medications reviewed.  Ongoing discussion about her behaviors with staff regularly.  Continue to keep safe in her own environment and redirect when not appropriate.     Electronically signed by:  RONY Patino CNP

## 2019-08-28 NOTE — LETTER
8/28/2019        RE: Sintia Connors  [facility]  [address] [facility] [facility] [address]        [place] [facility]  [place] Medical Record Number:  [id number]  [facility] [[id number][facility]  Psychiatry Problem    HPI:    Sintia Connors  is a 84 year old (1935), who is being seen today for an episodic care visit.  HPI information obtained from: facility staff and patient report. Today's concern is:    1. Paranoia (H)    2. Anxiety      Facility is having issues with Sintia and her behaviors.  She can be loud and obnoxious that disturbs others to the point it scares them.  Nursing has been trying to work with her  to figure out solutions like finding another place for her to live that would be more fitting for her.  It was suggested to nursing to maybe collect a urine to see if she could have a UTI that maybe is influencing her behaviors.    Nursing approached this NP to ask if maybe a urine sample could be obtained.  To do this, two of us went to her apartment to see how well this would go over with her to collect a sample.      Past Medical and Surgical History reviewed in Epic today.    MEDICATIONS:  Current Outpatient Medications   Medication Sig Dispense Refill     acetaminophen (TYLENOL) 500 MG tablet Take 500 mg by mouth every 8 hours as needed for mild pain       aspirin (ASA) 81 MG chewable tablet CHEW 1 TABLET BY MOUTH DAILY 30 tablet 3     ASPIRIN PO Take 81 mg by mouth daily       calcium carbonate (TUMS) 500 MG chewable tablet Take 1 chew tab by mouth every 8 hours as needed for heartburn  90 tablet      diclofenac (VOLTAREN) 1 % GEL APPLY 4 GRAMS TO KNEES OR 2 GRAMS TO HANDS FOUR TIMES DAILY USING ENCLOSED DOSING CARD. 100 g 3     glucosamine-chondroitin 500-400 MG CAPS per capsule Take 1 capsule by mouth daily 270 capsule 3     LANsoprazole (PREVACID) 15 MG DR capsule Take 1 capsule (15 mg) by  mouth daily 30 capsule 3     LANsoprazole (PREVACID) 15 MG DR capsule TAKE 1 CAPSULE BY MOUTH ONCE DAILY 30 capsule 3     levETIRAcetam (KEPPRA) 750 MG tablet TAKE 1 TABLET BY MOUTH TWO TIMES A DAY 60 tablet 3     metoprolol tartrate (LOPRESSOR) 25 MG tablet Take 1.5 tablets (37.5 mg) by mouth 2 times daily 270 tablet 3     multivitamin, therapeutic with minerals (MULTI-VITAMIN) TABS Take 1 tablet by mouth daily.       omeprazole (PRILOSEC) 10 MG DR capsule TAKE ONE CAPSULE BY MOUTH DAILY 30 capsule 3     ORDER FOR DME Equipment being ordered: Shower Chair 1 each 0     ORDER FOR DME Equipment being ordered: Bath transfer bench  Fax to:[phone] Device 0     ORDER FOR DME Equipment being ordered: transfer bath bench 1 Device 0     QUEtiapine (SEROQUEL) 50 MG tablet Take 1.5 tablets (75 mg) by mouth 2 times daily 60 tablet 0     QUEtiapine Fumarate (SEROQUEL PO) Take 50 mg by mouth 2 times daily       vitamin B-Complex TAKE 1 TABLET BY MOUTH ONCE DAILY 30 tablet 3     VITAMIN D, CHOLECALCIFEROL, PO Take 2,000 Units by mouth daily         REVIEW OF SYSTEMS:  4 point ROS including Respiratory, CV, GI and , other than that noted in the HPI,  is negative    Objective:  There were no vitals taken for this visit.  Exam:  Went in to her apartment after she answered the door.  She was obnoxious about seeing this NP and cool towards to nurse.  Nicely asked her to go into bathroom to go in the hat that the nurse went and put in the bathroom while this NP distracted her.  Sintia talked about [place] and grand thoughts.  People calling her from all over the [place] to talk to her about her wisdom.    Heart rate regular and strong.  S1 and S2.  Lungs are clear.  No edema in lower extremities.  Agreed to go to the bathroom and did not ask questions.    Easily got in to her apartment and easy to leave as gone in as a pair.        Labs:   No recent labs as she is difficult to allow people in to get labs    ASSESSMENT/PLAN:  1.  Paranoia (H)    2. Anxiety      - will see if the urine will show an infection that can be treated and may be influencing her behaviors.  Will check due to frequency and urgency as well as increased behaviors.  No changes with her medications of late.  Facility trying to work with her ACP psych person to assist with behaviors as well.    Orders written by provider at facility  UA/UC due to behaviors, frequency and urgency      Electronically signed by:  RONY Patino CNP               Sincerely,        RONY Patino CNP

## 2019-08-28 NOTE — PROGRESS NOTES
[place] [facility]  [place] Medical Record Number:  [id number]  Place of Service where encounter took place:  [facility] [[id number][facility]  Psychiatry Problem    HPI:    Sintia Connors  is a 84 year old (1935), who is being seen today for an episodic care visit.  HPI information obtained from: facility staff and patient report. Today's concern is:    1. Paranoia (H)    2. Anxiety      Facility is having issues with Sintia and her behaviors.  She can be loud and obnoxious that disturbs others to the point it scares them.  Nursing has been trying to work with her  to figure out solutions like finding another place for her to live that would be more fitting for her.  It was suggested to nursing to maybe collect a urine to see if she could have a UTI that maybe is influencing her behaviors.    Nursing approached this NP to ask if maybe a urine sample could be obtained.  To do this, two of us went to her apartment to see how well this would go over with her to collect a sample.      Past Medical and Surgical History reviewed in Epic today.    MEDICATIONS:  Current Outpatient Medications   Medication Sig Dispense Refill     acetaminophen (TYLENOL) 500 MG tablet Take 500 mg by mouth every 8 hours as needed for mild pain       aspirin (ASA) 81 MG chewable tablet CHEW 1 TABLET BY MOUTH DAILY 30 tablet 3     ASPIRIN PO Take 81 mg by mouth daily       calcium carbonate (TUMS) 500 MG chewable tablet Take 1 chew tab by mouth every 8 hours as needed for heartburn  90 tablet      diclofenac (VOLTAREN) 1 % GEL APPLY 4 GRAMS TO KNEES OR 2 GRAMS TO HANDS FOUR TIMES DAILY USING ENCLOSED DOSING CARD. 100 g 3     glucosamine-chondroitin 500-400 MG CAPS per capsule Take 1 capsule by mouth daily 270 capsule 3     LANsoprazole (PREVACID) 15 MG DR capsule Take 1 capsule (15 mg) by mouth daily 30 capsule 3     LANsoprazole (PREVACID) 15 MG DR capsule TAKE 1 CAPSULE BY MOUTH ONCE DAILY 30  capsule 3     levETIRAcetam (KEPPRA) 750 MG tablet TAKE 1 TABLET BY MOUTH TWO TIMES A DAY 60 tablet 3     metoprolol tartrate (LOPRESSOR) 25 MG tablet Take 1.5 tablets (37.5 mg) by mouth 2 times daily 270 tablet 3     multivitamin, therapeutic with minerals (MULTI-VITAMIN) TABS Take 1 tablet by mouth daily.       omeprazole (PRILOSEC) 10 MG DR capsule TAKE ONE CAPSULE BY MOUTH DAILY 30 capsule 3     ORDER FOR DME Equipment being ordered: Shower Chair 1 each 0     ORDER FOR DME Equipment being ordered: Bath transfer bench  Fax to:[phone] Device 0     ORDER FOR DME Equipment being ordered: transfer bath bench 1 Device 0     QUEtiapine (SEROQUEL) 50 MG tablet Take 1.5 tablets (75 mg) by mouth 2 times daily 60 tablet 0     QUEtiapine Fumarate (SEROQUEL PO) Take 50 mg by mouth 2 times daily       vitamin B-Complex TAKE 1 TABLET BY MOUTH ONCE DAILY 30 tablet 3     VITAMIN D, CHOLECALCIFEROL, PO Take 2,000 Units by mouth daily         REVIEW OF SYSTEMS:  4 point ROS including Respiratory, CV, GI and , other than that noted in the HPI,  is negative    Objective:  There were no vitals taken for this visit.  Exam:  Went in to her apartment after she answered the door.  She was obnoxious about seeing this NP and cool towards to nurse.  Nicely asked her to go into bathroom to go in the hat that the nurse went and put in the bathroom while this NP distracted her.  Sintia talked about [place] and grand thoughts.  People calling her from all over the world to talk to her about her wisdom.    Heart rate regular and strong.  S1 and S2.  Lungs are clear.  No edema in lower extremities.  Agreed to go to the bathroom and did not ask questions.    Easily got in to her apartment and easy to leave as gone in as a pair.        Labs:   No recent labs as she is difficult to allow people in to get labs    ASSESSMENT/PLAN:  1. Paranoia (H)    2. Anxiety      - will see if the urine will show an infection that can be treated and may be  influencing her behaviors.  Will check due to frequency and urgency as well as increased behaviors.  No changes with her medications of late.  Facility trying to work with her ACP psych person to assist with behaviors as well.    Orders written by provider at facility  UA/UC due to behaviors, frequency and urgency      Electronically signed by:  RONY Patino CNP

## 2019-10-01 NOTE — PROGRESS NOTES
[place] [facility] DISCHARGE SUMMARY  PATIENT'S NAME: Sintia Connors  YOB: 1935  MEDICAL RECORD NUMBER:  [id number]  Place of Service where encounter took place:  [facility] ([facility]) [[id number]]    PRIMARY CARE PROVIDER AND CLINIC RESPONSIBLE AFTER TRANSFER:   [facility]), [address] [facility] [address]    Non-FMG Provider  - needs to establish with new provider     Transferring providers: RONY Patino CNP, Dr. Liane MD  Recent Hospitalization/ED:  [facility] Hospital stay 7/29/18.  Date of SNF Admission: July / 29 / 2018  Date of SNF (anticipated) Discharge: family plans middle of October  Discharged to: [facility] in [place]  Cognitive Scores: Alert and oriented x2 - person, family and place  Physical Function: ambulates independently with 4 wheeled walker  DME: Walker    CODE STATUS/ADVANCE DIRECTIVES DISCUSSION:  Full Code   Has a health care directive on file at [facility]  ALLERGIES: Lorazepam    DISCHARGE DIAGNOSIS/NURSING FACILITY COURSE:     This NP started to follow Sintia in September 2018 at [facility].  She receives services that include med set up, meals delivered to her apartment, HHA services.  She has not been in the hospital since end of July 2018 for Suicidal ideation, history of depression, and adjustment disorder as her sons where helping her to move to different living situation and she became making suicidal comments.    At [facility], she is followed by ACP.  Communication with ACP is done between [facility] and them.  This NP has adjusted her Seroquel since following her, but otherwise safe mainly diffuse her grand ideas as they can.  Not uncommon to do a ovidio system when they would need to go into her apartment.    Below are her diagnoses that have corresponding medications to them.    Essential hypertension  Blood pressures ranged from  "110-130's/70-80's.  Only taken at visits.  Receives Metoprolol 37.5mg twice a day.      Seizures (H)  Has been on Keppra 750mg twice a day since this NP has followed her.  No recent levels.  No witnessed seizures.  Would be appropriate to be on medication given her old CVA.    Cerebrovascular accident, old  Hand grasps equal.  No facial deficits.  Able to ambulate independently with walker.  Will leave her apartment to go to the [facility] but otherwise is not a wanderer.    Remains on ASA 81mg daily.      Hypothyroidism due to acquired atrophy of thyroid  Currently on Levothyroxine 25mcg daily.    Last TSH was 5.83 on 9/20/18  Unclear why no follow up but she went through a phase of not allowing anyone in her apartment as she felt people were entering without permission back in 2018    Senile osteoporosis  Currently takes Glucosamine-Chondroitin 500-400 daily, Move Free Joint health advance tablet daily and Vitamin D3 1000 units daily.    Would be at risk for unexpected falls but has not fallen at [facility].    Gastroesophageal reflux disease without esophagitis  Does take a maintenance dose of omeprazole 10mg daily.    Will not make any changes.       Vascular dementia with behavior disturbance (H)  Paranoia (H)  Severe episode of recurrent major depressive disorder, with psychotic features (H)  - has the ASA 81mg daily and Seroquel 100mg twice a day.  Has had twice a day medication times and able to give her own medications once set up by nursing.  To keep her taking additional medications, kept her to twice a day regimen.    Knows her family, self, staff's names and place.  Time is vague.    She has Grand Ideas and at times her voice will escalate as she gets excited.    Today she brought up that last week she had to say mass 3x due to the 's back was out and could not say mass.    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  Does talk about her family in a nice way.  " Came to tears today when talking about her son whom passed away many years ago.  First time seeing tears with her.      Vitamin B12 deficiency without anemia  Is on a Vitamin B complex daily which can help with demented patients.  Her last check a year ago was at 571      Past Medical History:  has a past medical history of Atrial flutter (H) (4/12/15), Cerebral infarction (H), GERD (gastroesophageal reflux disease), PVC (premature ventricular contraction), Rheumatoid arthritis (H), S/P lumpectomy of breast, Seizures (H), and Senile osteoporosis.    Discharge Medications:  Current Outpatient Medications   Medication Sig Dispense Refill     Glucos-Chond-Hyal Ac-Ca Fructo (MOVE FREE JOINT HEALTH ADVANCE) TABS Take 1 tablet by mouth daily 30 tablet 0     levothyroxine (SYNTHROID/LEVOTHROID) 25 MCG tablet Take 1 tablet (25 mcg) by mouth daily 30 tablet 0     QUEtiapine (SEROQUEL) 50 MG tablet Take 2 tablets (100 mg) by mouth 2 times daily 60 tablet 0     vitamin D3 (CHOLECALCIFEROL) 1000 units (25 mcg) tablet Take 1 tablet (1,000 Units) by mouth daily 30 tablet 0     acetaminophen (TYLENOL) 500 MG tablet Take 500 mg by mouth every 8 hours as needed for mild pain       aspirin (ASA) 81 MG chewable tablet CHEW 1 TABLET BY MOUTH DAILY 30 tablet 3     calcium carbonate (TUMS) 500 MG chewable tablet Take 1 chew tab by mouth every 8 hours as needed for heartburn  90 tablet      diclofenac (VOLTAREN) 1 % GEL APPLY 4 GRAMS TO KNEES OR 2 GRAMS TO HANDS FOUR TIMES DAILY USING ENCLOSED DOSING CARD. 100 g 3     glucosamine-chondroitin 500-400 MG CAPS per capsule Take 1 capsule by mouth daily 270 capsule 3     levETIRAcetam (KEPPRA) 750 MG tablet TAKE 1 TABLET BY MOUTH TWO TIMES A DAY 60 tablet 3     metoprolol tartrate (LOPRESSOR) 25 MG tablet Take 1.5 tablets (37.5 mg) by mouth 2 times daily 270 tablet 3     multivitamin, therapeutic with minerals (MULTI-VITAMIN) TABS Take 1 tablet by mouth daily.       omeprazole (PRILOSEC) 10 MG   capsule TAKE ONE CAPSULE BY MOUTH DAILY 30 capsule 3     ORDER FOR DME Equipment being ordered: Shower Chair 1 each 0     ORDER FOR DME Equipment being ordered: Bath transfer bench  Fax to:[phone] Device 0     ORDER FOR DME Equipment being ordered: transfer bath bench 1 Device 0     vitamin B-Complex TAKE 1 TABLET BY MOUTH ONCE DAILY 30 tablet 3       Medication Changes/Rationale:     6/18/19  Increase Seroquel to 75mg twice a day due to major depression with psychosis    8/28/19  UA/UC please - increased behaviors and agitation    9/10/19  OT to eval cognitive skills    9/17/19  Increase Seroquel to 100mg BID - major depession delusions    10/1/19  OK to discharge to [facility] with medications.      Controlled medications sent with patient:   not applicable/none     ROS:   4 point ROS including Respiratory, CV, GI and , other than that noted in the HPI,  is negative    Physical Exam:   Vitals: /80   Pulse 102   Temp 98.2  F (36.8  C)   Resp 18   Wt 106.9 kg (235 lb 10.8 oz)   SpO2 96%   BMI 43.10 kg/m    BMI= Body mass index is 43.1 kg/m .  GENERAL APPEARANCE:  Alert, in no distress, appears healthy, cooperative  EYES:  PERRL, Conjunctiva and lids normal  NECK:  No adenopathy,masses or thyromegaly  RESP:  respiratory effort and palpation of chest normal, lungs clear to auscultation , no respiratory distress  CV:  Palpation and auscultation of heart done , no edema, heart rate regular/irregular  ABDOMEN:  normal bowel sounds, soft, nontender, no hepatosplenomegaly or other masses, morbidly obese  M/S:   Gait and station abnormal uses a 4 wheeled walker with a seat and will sit on it safely  SKIN:  Inspection of skin and subcutaneous tissue baseline, Palpation of skin and subcutaneous tissue baseline  PSYCH:  oriented to person and place, insight and judgement impaired, memory impaired , pleasant most of the time, staff compare what she says all the time as she her grand ideas can  get the best of her.     SNF labs:   9/20/18    WBC = 8.4, HgB = 13.8, MCV = 90  Na = 143, K+ = 4.2, BUN = 13, Cr = 0.61 with GFR = >60, Ca = 8.2  Vitamin B12 = 671, Vitamin D = 23, TSH = 5.83    UA/UC done end of August was negative      DISCHARGE PLAN:    Follow up labs: will need to obtain new labs at facility    Medical Follow Up:      Will need to establish with in house providers    MTM referral needed and placed by this provider: Katya    Current [place] scheduled appointments:   N/A    Discharge Services: moving to a facility with nursing care    Discharge Instructions Verbalized to Patient at Discharge:     None    Spoke with Son on 9/30/19 and goal is to move his mother mid October    Spoke with rep from [facility] and they will facilitate move once this visit is faxed to them.      TOTAL DISCHARGE TIME:   Greater than 30 minutes  Electronically signed by:  RONY Patino CNP

## 2019-10-01 NOTE — LETTER
10/1/2019        RE: Sintia Connors  [facility]  [address] [address]        [place] [facility] DISCHARGE SUMMARY  PATIENT'S NAME: Sintia Connors  YOB: 1935  MEDICAL RECORD NUMBER:  [id number]  Place of Service where encounter took place:  [facility] ([facility]) [[id number]]    PRIMARY CARE PROVIDER AND CLINIC RESPONSIBLE AFTER TRANSFER:   [facility]), [address] [facility] [address]    Non-FMG Provider  - needs to establish with new provider     Transferring providers: RONY Patino CNP, Dr. Liane MD  Recent Hospitalization/ED:  [facility] Hospital stay 7/29/18.  Date of SNF Admission: July / 29 / 2018  Date of SNF (anticipated) Discharge: family plans middle of October  Discharged to: [facility] in [place]  Cognitive Scores: Alert and oriented x2 - person, family and place  Physical Function: ambulates independently with 4 wheeled walker  DME: Walker    CODE STATUS/ADVANCE DIRECTIVES DISCUSSION:  Full Code   Has a health care directive on file at [facility]  ALLERGIES: Lorazepam    DISCHARGE DIAGNOSIS/NURSING FACILITY COURSE:     This NP started to follow Sintia in September 2018 at [facility].  She receives services that include med set up, meals delivered to her apartment, HHA services.  She has not been in the hospital since end of July 2018 for Suicidal ideation, history of depression, and adjustment disorder as her sons where helping her to move to different living situation and she became making suicidal comments.    At [facility], she is followed by ACP.  Communication with ACP is done between [facility] and them.  This NP has adjusted her Seroquel since following her, but otherwise safe mainly diffuse her grand ideas as they can.  Not uncommon to do a ovidio system when they would need to go into her apartment.    Below are her  diagnoses that have corresponding medications to them.    Essential hypertension  Blood pressures ranged from 110-130's/70-80's.  Only taken at visits.  Receives Metoprolol 37.5mg twice a day.      Seizures (H)  Has been on Keppra 750mg twice a day since this NP has followed her.  No recent levels.  No witnessed seizures.  Would be appropriate to be on medication given her old CVA.    Cerebrovascular accident, old  Hand grasps equal.  No facial deficits.  Able to ambulate independently with walker.  Will leave her apartment to go to the [place] but otherwise is not a wanderer.    Remains on ASA 81mg daily.      Hypothyroidism due to acquired atrophy of thyroid  Currently on Levothyroxine 25mcg daily.    Last TSH was 5.83 on 9/20/18  Unclear why no follow up but she went through a phase of not allowing anyone in her apartment as she felt people were entering without permission back in 2018    Senile osteoporosis  Currently takes Glucosamine-Chondroitin 500-400 daily, Move Free Joint health advance tablet daily and Vitamin D3 1000 units daily.    Would be at risk for unexpected falls but has not fallen at [facility].    Gastroesophageal reflux disease without esophagitis  Does take a maintenance dose of omeprazole 10mg daily.    Will not make any changes.       Vascular dementia with behavior disturbance (H)  Paranoia (H)  Severe episode of recurrent major depressive disorder, with psychotic features (H)  - has the ASA 81mg daily and Seroquel 100mg twice a day.  Has had twice a day medication times and able to give her own medications once set up by nursing.  To keep her taking additional medications, kept her to twice a day regimen.    Knows her family, self, staff's names and place.  Time is vague.    She has Grand Ideas and at times her voice will escalate as she gets excited.    Today she brought up that last week she had to say mass 3x due to the 's back was out and could not say mass.    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  Does talk about her family in a nice way.  Came to tears today when talking about her son whom passed away many years ago.  First time seeing tears with her.      Vitamin B12 deficiency without anemia  Is on a Vitamin B complex daily which can help with demented patients.  Her last check a year ago was at 571      Past Medical History:  has a past medical history of Atrial flutter (H) (4/12/15), Cerebral infarction (H), GERD (gastroesophageal reflux disease), PVC (premature ventricular contraction), Rheumatoid arthritis (H), S/P lumpectomy of breast, Seizures (H), and Senile osteoporosis.    Discharge Medications:  Current Outpatient Medications   Medication Sig Dispense Refill     Glucos-Chond-Hyal Ac-Ca Fructo (MOVE FREE [facility] ADVANCE) TABS Take 1 tablet by mouth daily 30 tablet 0     levothyroxine (SYNTHROID/LEVOTHROID) 25 MCG tablet Take 1 tablet (25 mcg) by mouth daily 30 tablet 0     QUEtiapine (SEROQUEL) 50 MG tablet Take 2 tablets (100 mg) by mouth 2 times daily 60 tablet 0     vitamin D3 (CHOLECALCIFEROL) 1000 units (25 mcg) tablet Take 1 tablet (1,000 Units) by mouth daily 30 tablet 0     acetaminophen (TYLENOL) 500 MG tablet Take 500 mg by mouth every 8 hours as needed for mild pain       aspirin (ASA) 81 MG chewable tablet CHEW 1 TABLET BY MOUTH DAILY 30 tablet 3     calcium carbonate (TUMS) 500 MG chewable tablet Take 1 chew tab by mouth every 8 hours as needed for heartburn  90 tablet      diclofenac (VOLTAREN) 1 % GEL APPLY 4 GRAMS TO KNEES OR 2 GRAMS TO HANDS FOUR TIMES DAILY USING ENCLOSED DOSING CARD. 100 g 3     glucosamine-chondroitin 500-400 MG CAPS per capsule Take 1 capsule by mouth daily 270 capsule 3     levETIRAcetam (KEPPRA) 750 MG tablet TAKE 1 TABLET BY MOUTH TWO TIMES A DAY 60 tablet 3     metoprolol tartrate (LOPRESSOR) 25 MG tablet Take 1.5 tablets (37.5 mg) by mouth 2 times daily 270 tablet 3     multivitamin, " therapeutic with minerals (MULTI-VITAMIN) TABS Take 1 tablet by mouth daily.       omeprazole (PRILOSEC) 10 MG DR capsule TAKE ONE CAPSULE BY MOUTH DAILY 30 capsule 3     ORDER FOR DME Equipment being ordered: Shower Chair 1 each 0     ORDER FOR DME Equipment being ordered: Bath transfer bench  Fax to:[phone] Device 0     ORDER FOR DME Equipment being ordered: transfer bath bench 1 Device 0     vitamin B-Complex TAKE 1 TABLET BY MOUTH ONCE DAILY 30 tablet 3       Medication Changes/Rationale:     6/18/19  Increase Seroquel to 75mg twice a day due to major depression with psychosis    8/28/19  UA/UC please - increased behaviors and agitation    9/10/19  OT to eval cognitive skills    9/17/19  Increase Seroquel to 100mg BID - major depession delusions    10/1/19  OK to discharge to [facility] with medications.      Controlled medications sent with patient:   not applicable/none     ROS:   4 point ROS including Respiratory, CV, GI and , other than that noted in the HPI,  is negative    Physical Exam:   Vitals: /80   Pulse 102   Temp 98.2  F (36.8  C)   Resp 18   Wt 106.9 kg (235 lb 10.8 oz)   SpO2 96%   BMI 43.10 kg/m     BMI= Body mass index is 43.1 kg/m .  GENERAL APPEARANCE:  Alert, in no distress, appears healthy, cooperative  EYES:  PERRL, Conjunctiva and lids normal  NECK:  No adenopathy,masses or thyromegaly  RESP:  respiratory effort and palpation of chest normal, lungs clear to auscultation , no respiratory distress  CV:  Palpation and auscultation of heart done , no edema, heart rate regular/irregular  ABDOMEN:  normal bowel sounds, soft, nontender, no hepatosplenomegaly or other masses, morbidly obese  M/S:   Gait and station abnormal uses a 4 wheeled walker with a seat and will sit on it safely  SKIN:  Inspection of skin and subcutaneous tissue baseline, Palpation of skin and subcutaneous tissue baseline  PSYCH:  oriented to person and place, insight and judgement impaired,  memory impaired , pleasant most of the time, staff compare what she says all the time as she her grand ideas can get the best of her.     SNF labs:   9/20/18    WBC = 8.4, HgB = 13.8, MCV = 90  Na = 143, K+ = 4.2, BUN = 13, Cr = 0.61 with GFR = >60, Ca = 8.2  Vitamin B12 = 671, Vitamin D = 23, TSH = 5.83    UA/UC done end of August was negative      DISCHARGE PLAN:    Follow up labs: will need to obtain new labs at facility    Medical Follow Up:      Will need to establish with in house providers    MTM referral needed and placed by this provider: No    Current [place] scheduled appointments:   N/A    Discharge Services: moving to a facility with nursing care    Discharge Instructions Verbalized to Patient at Discharge:     None    Spoke with Son on 9/30/19 and goal is to move his mother mid October    Spoke with rep from [facility] and they will facilitate move once this visit is faxed to them.      TOTAL DISCHARGE TIME:   Greater than 30 minutes  Electronically signed by:  RONY Patino CNP                         Sincerely,        RONY Patino CNP

## 2019-10-20 PROBLEM — A41.9 SEPSIS (H): Status: ACTIVE | Noted: 2019-01-01

## 2019-10-20 NOTE — ED TRIAGE NOTES
Per EMS report, pt lives at assisted living facility and was yelling out (common per her baseline), staff checked on pt and she was found on the floor. Pt has a baseline of agitation, delusions and history of CVA with left sided deficits, but has been having increase in agitation and delusions the past 3 days.

## 2019-10-20 NOTE — PROGRESS NOTES
RECEIVING UNIT ED HANDOFF REVIEW    ED Nurse Handoff Report was reviewed by: Susan Edmond RN on October 20, 2019 at 4:58 PM

## 2019-10-20 NOTE — H&P
[facility]    History and Physical  Hospitalist       Date of Admission:  10/20/2019    Assessment & Plan   Sintia Connors is a 84 year old female who presents with Vascular dementia, Paranoia, Depression with psychotic features, Hypertension, Seizures, History of cerebrovascular accident, Hypothyroidism, Osteoporosis, and GERD who was brought into the ER today via EMS after being found on the floor in the kitchen of her assisted living unit earlier today. Evaluation in the ER revealed severe sepsis, pneumonia, and acute kidney injury. She was given IV fluids and started on antibiotics. The hospitalist service was contacted to admit the patient.    Community acquired pneumonia  - Was recently seen in the [place] ER, started on oral doxycycline for left lower lobe pneumonia.  - Has left lower lobe infiltrate on CXR today.  - WBC up to 31.6.  - Hypoxic on room air (89%), now on supplemental oxygen, wean as tolerated.  - Obtain sputum culture if able.  - Was given a dose of Zosyn in the ER, will switch to IV levaquin in the hospital (pharmacy to adjust dose for renal function).    Severe sepsis  - Secondary to pneumonia.  - WBC 31.6. Tachycardic.  - Lactic acid 2.3, repeat pending.  - Received IV fluid bolus in the ER. Continue IV fluids in the hospital. Does not meet criteria for 30 ml/kg bolus.  - Levaquin as noted above.  - Follow-up on blood culture results.    Acute kidney injury  - Likely pre-renal secondary to sepsis.  - May also have rhabdomyolysis as noted below.  - IV fluids.  - Monitor intake and output.  - Recheck labs in AM.  - Avoid nephrotoxic medications.    Atrial fibrillation/flutter  - Has a history of atrial fibrillation/flutter in the past.  - Went into atrial fibrillation with RVR while in the ER.  - Was given IV diltiazem and then started on a diltiazem drip, continue the same in the hospital.  - Unclear if she is anticoagulated or not, does not have any anticoagulants on her  med list, however pharmacy unable to verify this evening. Follow-up in AM regarding anticoagulation.    Suspected rhabdomyolysis  - Was found on the floor in her kitchen, unclear exactly how long she was on the floor.  - UA with large blood but only 5 RBCs.  - CK added to labs from ER, follow-up on results  - IV fluids.    Vascular dementia  Paranoia  Depression with psychotic features  - Resume PTA aspirin and seroquel when verified by pharmacy.  - Plan of care discussed with her son, Pérez, by phone this afternoon.    Hypertension  - Blood pressure elevated in the ER.  - Resume PTA metoprolol when verified by pharmacy.    Seizures  - Resume PTA keppra when verified by pharmacy.    History of cerebrovascular accident  - Continue PTA aspirin.    Hypothyroidism  - Resume PTA levothyroxine when verified by pharmacy    Osteoporosis  - Resume PTA supplements upon discharge.    GERD  - Continue PTA omeprazole when verified by pharmacy.    Deconditioning  - Consult PT/OT.    DVT Prophylaxis: Pneumatic Compression Devices  Code Status: Full Code per discussion with her son this afternoon  Expected discharge: 3-4 days, recommended to TCU vs prior living arrangement once antibiotic plan established, renal function improved and SIRS/Sepsis treated.    Salvatore Reardon MD    Primary Care Physician   [facility])    Chief Complaint   Fall, weakness, cough    History is obtained from the patient, medical record, ER provider, and patient's son    History of Present Illness   Sintia Connors is a 84 year old female who presents with Vascular dementia, Paranoia, Depression with psychotic features, Hypertension, Seizures, History of cerebrovascular accident, Hypothyroidism, Osteoporosis, and GERD who was brought into the ER today via EMS after being found on the floor in the kitchen of her assisted living unit earlier today. She was able to get up with assistance and there were not any apparent injuries.  It is unclear how she ended up on the floor and how long she was on the floor. She does not recall details about the events leading up to her coming to the ER.  In the ER, she was noted to be tachycardic. WBC was elevated at 31.6. Creatinine up to 2.89 from baseline of about 0.6-0.7. Chest x-ray showed a left lower lobe infiltrate. Lactic acid elevated at 2.3. She was given IV fluids and a dose of Zosyn. The hospitalist service was contacted to admit the patient due to her severe sepsis, pneumonia, and acute kidney injury.   When I saw her in the ER, she did not have any complaints, other than wanting to get some Pepsi to drink. Due to her dementia, she is not a reliable historian. She has been complaining of shortness of breath and a non-productive cough per report. She denies any current shortness of breath, chest pain, fevers, nausea, abdominal pain. She was recently seen at the [place] ER on 10/16/19. She was diagnosed with pneumonia and started on oral doxycycline.     Past Medical History    I have reviewed this patient's medical history and updated it with pertinent information if needed.   Past Medical History:   Diagnosis Date     Atrial flutter (H) 4/12/15    post op ANW     Cerebral infarction (H)      GERD (gastroesophageal reflux disease)      PVC (premature ventricular contraction)      Rheumatoid arthritis (H)      S/P lumpectomy of breast     benign      Seizures (H)      Senile osteoporosis      Past Surgical History   I have reviewed this patient's surgical history and updated it with pertinent information if needed.  Past Surgical History:   Procedure Laterality Date     APPENDECTOMY       Athroplasty right knee  4/2015     C TOTAL KNEE ARTHROPLASTY Left 09/2015     HERNIA REPAIR      hiatial     HERNIA REPAIR      inguial hernia repair     HYSTERECTOMY TOTAL ABDOMINAL, BILATERAL SALPINGO-OOPHORECTOMY, COMBINED       Prior to Admission Medications   Prior to Admission Medications    Prescriptions Last Dose Informant Patient Reported? Taking?   Glucos-Chond-Hyal Ac-Ca Fructo (MOVE FREE JOINT HEALTH ADVANCE) TABS   No No   Sig: Take 1 tablet by mouth daily   ORDER FOR DME   No No   Sig: Equipment being ordered: transfer bath bench   ORDER FOR DME   No No   Sig: Equipment being ordered: Bath transfer bench  Fax to:[phone]   ORDER FOR DME   No No   Sig: Equipment being ordered: Shower Chair   QUEtiapine (SEROQUEL) 50 MG tablet   No No   Sig: Take 2 tablets (100 mg) by mouth 2 times daily   acetaminophen (TYLENOL) 500 MG tablet   Yes No   Sig: Take 500 mg by mouth every 8 hours as needed for mild pain   aspirin (ASA) 81 MG chewable tablet   No No   Sig: CHEW 1 TABLET BY MOUTH DAILY   calcium carbonate (TUMS) 500 MG chewable tablet   Yes No   Sig: Take 1 chew tab by mouth every 8 hours as needed for heartburn    diclofenac (VOLTAREN) 1 % GEL   No No   Sig: APPLY 4 GRAMS TO KNEES OR 2 GRAMS TO HANDS FOUR TIMES DAILY USING ENCLOSED DOSING CARD.   glucosamine-chondroitin 500-400 MG CAPS per capsule   No No   Sig: Take 1 capsule by mouth daily   levETIRAcetam (KEPPRA) 750 MG tablet   No No   Sig: TAKE 1 TABLET BY MOUTH TWO TIMES A DAY   levothyroxine (SYNTHROID/LEVOTHROID) 25 MCG tablet   No No   Sig: TAKE 1 TABLET (25MCG) BY MOUTH ONCE DAILY   metoprolol tartrate (LOPRESSOR) 25 MG tablet   No No   Sig: Take 1.5 tablets (37.5 mg) by mouth 2 times daily   multivitamin, therapeutic with minerals (MULTI-VITAMIN) TABS  Son Yes No   Sig: Take 1 tablet by mouth daily.   omeprazole (PRILOSEC) 10 MG DR capsule   No No   Sig: TAKE ONE CAPSULE BY MOUTH DAILY   vitamin B-Complex   No No   Sig: TAKE ONE TABLET BY MOUTH ONCE DAILY   vitamin D3 (CHOLECALCIFEROL) 1000 units (25 mcg) tablet   No No   Sig: Take 1 tablet (1,000 Units) by mouth daily      Facility-Administered Medications: None     Allergies   Allergies   Allergen Reactions     Lorazepam Visual Disturbance     Severe hallucinations;     Social  History   I have reviewed this patient's social history and updated it with pertinent information if needed. Sintia Connors  reports that she quit smoking about 36 years ago. She smoked 0.00 packs per day. She has never used smokeless tobacco. She reports that she does not drink alcohol or use drugs.    Family History   I have reviewed this patient's family history and updated it with pertinent information if needed.   Family History   Problem Relation Age of Onset     Heart Disease No family hx of      Arrhythmia No family hx of      Review of Systems   The 10 point Review of Systems is negative other than noted in the HPI or here.    Physical Exam       BP: 138/88 Pulse: 113   Resp: 18 SpO2: 93 % O2 Device: None (Room air)    Vital Signs with Ranges  Pulse:  [113] 113  Resp:  [18] 18  BP: (138)/(88) 138/88  SpO2:  [93 %] 93 %  0 lbs 0 oz    Constitutional: awake, alert, cooperative, no apparent distress, fixated on getting some Pepsi to drink  Eyes: pupils equal, round and reactive to light, conjunctiva normal  ENT: oral pharynx with dry mucous membranes  Respiratory: clear to auscultation bilaterally, no crackles or wheezing  Cardiovascular: tachycardic, regular rhythm, normal S1 and S2, and no murmur noted  GI: normal bowel sounds, soft, obese, non-distended, non-tender  Skin: warm, dry  Musculoskeletal: no lower extremity pitting edema present  Neurologic: awake, alert, oriented to name, but not to place or time.  Cranial nerves II-XII are grossly intact.  Motor is 5 out of 5 bilaterally.  Sensory is intact.     Data   Data reviewed today:  I personally reviewed the EKG tracing showing sinus tachycardia without any acute ischemic changes and the chest x-ray image(s) showing left lower lobe infiltrate.    Recent Labs   Lab 10/20/19  1352   WBC 31.6*   HGB 14.7   MCV 89         POTASSIUM 3.8   CHLORIDE 106   CO2 23   BUN 43*   CR 2.89*   ANIONGAP 11   FLAVIA 10.2*   *     Recent Results (from the  past 24 hour(s))   Chest XR,  PA & LAT    Narrative    CHEST TWO VIEW   10/20/2019 2:54 PM     HISTORY: Sepsis.    COMPARISON: Chest x-ray 5/12/2015.      Impression    IMPRESSION: Two views of the chest performed. Left lower lobe airspace  consolidation is increased compared to prior study probably indicating  left lower lobe pneumonia. Right lung is clear. Heart size is probably  within normal limits but the left heart border is obscured by the  airspace consolidation. No pneumothorax or obvious pleural effusions.    SHIMON RAMOS MD

## 2019-10-20 NOTE — ED NOTES
"[facility]  ED Nurse Handoff Report    ED Chief complaint: Fall and Agitation      ED Diagnosis:   Final diagnoses:   Pneumonia of left lower lobe due to infectious organism (H)   Sepsis with acute renal failure without septic shock, due to unspecified organism, unspecified acute renal failure type (H)       Code Status: Full Code    Allergies:   Allergies   Allergen Reactions     Lorazepam Visual Disturbance     Severe hallucinations;       Activity level - Baseline/Home:  Independent with walker  Activity Level - Current:   Stand with Assist    Patient's Preferred language: English   Needed?: No    Isolation: No  Infection: Not Applicable  Bariatric?: No    Vital Signs:   Vitals:    10/20/19 1334   BP: 138/88   Pulse: 113   Resp: 18   SpO2: 93%       Cardiac Rhythm: ,        Pain level:      Is this patient confused?: Yes   Does this patient have a guardian?  No         If yes, is there guardianship documents in the Epic \"Code/ACP\" activity?  No         Guardian Notified?  N/A  [place] - Suicide Severity Rating Scale Completed?  No, secondary to n/a  If yes, what color did the patient score?  N/A    Patient Report: Initial Complaint: Pt from assisted living, is normally agitated and delusional at baseline but the past 3 days behaviors have increased. Found on floor after calling out, denies head strike, no obvious injury.  Focused Assessment: Denies difficulty breathing or chest pain. Cath UA obtained and urine is dark and cloudy. Pt agitated but redirectable. No attempts to self transfer.   Tests Performed: Labs, chest xray, EKG  Abnormal Results:   Results for orders placed or performed during the hospital encounter of 10/20/19   Chest XR,  PA & LAT    Narrative    CHEST TWO VIEW   10/20/2019 2:54 PM     HISTORY: Sepsis.    COMPARISON: Chest x-ray 5/12/2015.      Impression    IMPRESSION: Two views of the chest performed. Left lower lobe airspace  consolidation is increased compared " to prior study probably indicating  left lower lobe pneumonia. Right lung is clear. Heart size is probably  within normal limits but the left heart border is obscured by the  airspace consolidation. No pneumothorax or obvious pleural effusions.    SHIMON RAMOS MD   CBC with platelets differential   Result Value Ref Range    WBC 31.6 (H) 4.0 - 11.0 10e9/L    RBC Count 5.05 3.8 - 5.2 10e12/L    Hemoglobin 14.7 11.7 - 15.7 g/dL    Hematocrit 44.9 35.0 - 47.0 %    MCV 89 78 - 100 fl    MCH 29.1 26.5 - 33.0 pg    MCHC 32.7 31.5 - 36.5 g/dL    RDW 13.4 10.0 - 15.0 %    Platelet Count 333 150 - 450 10e9/L    Diff Method Automated Method     % Neutrophils 86.4 %    % Lymphocytes 3.1 %    % Monocytes 9.4 %    % Eosinophils 0.0 %    % Basophils 0.1 %    % Immature Granulocytes 1.0 %    Nucleated RBCs 0 0 /100    Absolute Neutrophil 27.3 (H) 1.6 - 8.3 10e9/L    Absolute Lymphocytes 1.0 0.8 - 5.3 10e9/L    Absolute Monocytes 3.0 (H) 0.0 - 1.3 10e9/L    Absolute Eosinophils 0.0 0.0 - 0.7 10e9/L    Absolute Basophils 0.0 0.0 - 0.2 10e9/L    Abs Immature Granulocytes 0.3 0 - 0.4 10e9/L    Absolute Nucleated RBC 0.0    Basic metabolic panel   Result Value Ref Range    Sodium 140 133 - 144 mmol/L    Potassium 3.8 3.4 - 5.3 mmol/L    Chloride 106 94 - 109 mmol/L    Carbon Dioxide 23 20 - 32 mmol/L    Anion Gap 11 3 - 14 mmol/L    Glucose 134 (H) 70 - 99 mg/dL    Urea Nitrogen 43 (H) 7 - 30 mg/dL    Creatinine 2.89 (H) 0.52 - 1.04 mg/dL    GFR Estimate 15 (L) >60 mL/min/[1.73_m2]    GFR Estimate If Black 17 (L) >60 mL/min/[1.73_m2]    Calcium 10.2 (H) 8.5 - 10.1 mg/dL   UA reflex to Microscopic and Culture   Result Value Ref Range    Color Urine Dark Brown     Appearance Urine Cloudy     Glucose Urine 30 (A) NEG^Negative mg/dL    Bilirubin Urine Small (A) NEG^Negative    Ketones Urine 5 (A) NEG^Negative mg/dL    Specific Gravity Urine 1.020 1.003 - 1.035    Blood Urine Large (A) NEG^Negative    pH Urine 5.5 5.0 - 7.0 pH    Protein  Albumin Urine 100 (A) NEG^Negative mg/dL    Urobilinogen mg/dL 2.0 0.0 - 2.0 mg/dL    Nitrite Urine Negative NEG^Negative    Leukocyte Esterase Urine Negative NEG^Negative    Source Catheterized Urine     RBC Urine 5 (H) 0 - 2 /HPF    WBC Urine 14 (H) 0 - 5 /HPF    Squamous Epithelial /HPF Urine 4 (H) 0 - 1 /HPF    Mucous Urine Present (A) NEG^Negative /LPF    Hyaline Casts 25 (H) 0 - 2 /LPF    Amorphous Crystals Few (A) NEG^Negative /HPF   EKG 12-lead, tracing only   Result Value Ref Range    Interpretation ECG Click View Image link to view waveform and result    ISTAT gases lactate apollo POCT   Result Value Ref Range    Ph Venous 7.35 7.32 - 7.43 pH    PCO2 Venous 41 40 - 50 mm Hg    PO2 Venous 16 (L) 25 - 47 mm Hg    Bicarbonate Venous 23 21 - 28 mmol/L    O2 Sat Venous 21 %    Lactic Acid 2.3 (H) 0.7 - 2.1 mmol/L     Chest XR,  PA & LAT   Final Result   IMPRESSION: Two views of the chest performed. Left lower lobe airspace   consolidation is increased compared to prior study probably indicating   left lower lobe pneumonia. Right lung is clear. Heart size is probably   within normal limits but the left heart border is obscured by the   airspace consolidation. No pneumothorax or obvious pleural effusions.      SHIMON RAMOS MD          Treatments provided: Monitoring    Family Comments: No family at bedside    OBS brochure/video discussed/provided to patient/family: No              Name of person given brochure if not patient: n/a              Relationship to patient: n/a    ED Medications:   Medications   0.9% sodium chloride BOLUS (1,000 mLs Intravenous New Bag 10/20/19 9714)     Followed by   sodium chloride 0.9% infusion (has no administration in time range)   lidocaine 1 % 0.1-1 mL (has no administration in time range)   lidocaine (LMX4) cream (has no administration in time range)   sodium chloride (PF) 0.9% PF flush 3 mL (has no administration in time range)   sodium chloride (PF) 0.9% PF flush 3 mL (has no  administration in time range)   piperacillin-tazobactam (ZOSYN) 4.5 g vial to attach to  mL bag (has no administration in time range)       Drips infusing?:  No    For the majority of the shift this patient was Green.   Interventions performed were n/a.    Severe Sepsis OR Septic Shock Diagnosis Present: No    To be done/followed up on inpatient unit:  Continue plan of care    ED NURSE PHONE NUMBER: [id number]

## 2019-10-20 NOTE — ED NOTES
DATE:  10/20/2019   TIME OF RECEIPT FROM LAB:  2457  LAB TEST:  CK  LAB VALUE:  6077  RESULTS GIVEN WITH READ-BACK TO (PROVIDER):  Shaun Marquis MD  TIME LAB VALUE REPORTED TO PROVIDER:   4815

## 2019-10-20 NOTE — ED PROVIDER NOTES
History     Chief Complaint:  Altered mental status      The history is provided by the patient and the EMS personnel. The history is limited by the condition of the patient.      Sintia Connors is a 84 year old female with a history of paranoia, atrial fibrillation, and memory loss who presents with altered mental status via EMS. EMS reports that they were called by her assisted living staff who hear her yelling, which is not unusual, and found her lying on the ground in her kitchen. EMS reported that the assisted living staff reported that she is alert to self only, agitated, and delusional at baseline but has had more falls recently, is weaker, and has developed a cough. They also reported that she is more agitated today. EMS reported that she did not have any signs of injury, denied pain, and was able to stand with assistance on the scene. EMS reported that she had sinus tachycardia with a rate of 115-120, blood pressures of 145/70s, and a blood glucose of 199 in transit. They reported that she initially had O2 saturation of 87-88% on room air which jeanne to 95% after she was placed on 2L O2. EMS reported that she has a history of cerebrovascular accident and depression. In the emergency department, the patient reports that she fell and has some head pain but denies hip or abdominal pain.    Allergies:  Lorazepam     Medications:    Synthroid  Seroquel  Keppra  Prilosec  Aspirin 81 mg  Lopressor  Adoxa    Past Medical History:    Paranoia  Leg edema  Constipation  Bilateral knee degenerative disease  Cerebral infarction  Iron deficiency anemia  PVC  Seizures  Rheumatoid arthritis  Atrial fibrillation and flutter  Osteoporosis  GERD  Depression  Anxiety  Memory loss   Insomnia  Vitamin D deficiency  Intraparenchymal hematoma of brain, left    Past Surgical History:    Appendectomy  Breast lumpectomy  Total knee arthroplasty  Hiatal hernia repair  Inguinal hernia repair  Total hysterectomy and bilateral  salpingo-oophorectomy    Family History:    No past pertinent family history.    Social History:  Smoking status: Former  Alcohol use: No    Marital Status:      Review of Systems   Unable to perform ROS: Dementia   Respiratory: Positive for cough.    Gastrointestinal: Negative for abdominal pain.   Neurological: Positive for weakness and headaches.   Psychiatric/Behavioral: Positive for agitation.         Physical Exam     Patient Vitals for the past 24 hrs:   BP Pulse Resp SpO2   10/20/19 1733 116/46 107 -- 95 %   10/20/19 1334 138/88 113 18 93 %       Physical Exam  Constitutional: The patient is oriented to person, place, and time. Agitated but cooperative. Obese.  HENT:   Head: Atraumatic  Right Ear: Normal  Left Ear: Normal  Nose: Nose normal.   Mouth/Throat: Oropharynx is clear. No erythema or exudate. Tacky oral mucosa.  Eyes: Conjunctivae and EOM are normal. Pupils are equal, round, and reactive to light. No discharge  Neck: Normal range of motion. Neck supple.   Cardiovascular: Tachycardic rate, regular rhythm, no murmur gallops or rubs. Intact distal pulses.    Pulmonary/Chest: CTA bilaterally. No wheezes rale or rhonchi. Dry cough.  Abdominal: Soft. Non tender.  No masses   Musculoskeletal: No edema. No bony deformity. Normal range of motion. No tenderness of hips or pelvis. Normal range of motion of legs.  Lymphadenopathy:     The patient has no cervical adenopathy.   Neurological: The patient is alert and oriented to person, place, and time. The patient has normal strength and normal reflexes. No cranial nerve deficit. Coordination normal.  Skin: Skin is warm and dry. No rash noted. The patient is not diaphoretic.   Psychiatric: The patient has a normal mood and affect.    Emergency Department Course   ECG:  Indication: agitation  Time: 1419  Vent. Rate 105 bpm. RI interval 138. QRS duration 66. QT/QTc 308/407. P-R-T axis 78 7 21. Sinus tachycardia. Low voltage QRS. Borderline ECG Read time:  1426.    Indication: repeat ECG  Time: 1748  Vent. Rate 158 bpm. AR interval *. QRS duration 68. QT/QTc 250/405. P-R-T axis * 13 267. Atrial fibrillation with rapid ventricular response. Nonspecific ST and T wave abnormality. Abnormal ECG. Read time: 1757.    Imaging:  Radiographic findings were communicated with the patient who voiced understanding of the findings.    XR Chest PA and LAT:   Two views of the chest performed. Left lower lobe airspace  consolidation is increased compared to prior study probably indicating  left lower lobe pneumonia. Right lung is clear. Heart size is probably  within normal limits but the left heart border is obscured by the  airspace consolidation. No pneumothorax or obvious pleural effusions.    Read as per radiology.    Laboratory:    1421: ISTAT VBG: pH 7.35 / PCO2 41 / PO2 16 (L) / Bicarb 23 / O2 sat 21 / lactic acid 2.3 (H)  1733: ISTAT VBG: pH 7.34 / PCO2 40 / PO2 17 (L) / Bicarb 22 / O2 sat 22 / lactic acid 2.2 (H)    BMP: Glucose 134 (H), urea nitrogen: 43 (H), GFR estimate: 15 (L), calcium: 10.2 (H), o/w WNL (Creatinine: 2.89 (H))    CBC: WBC: 31.6 (H), HGB: 14.7, PLT: 333    CK total: in process    Blood culture: no growth after 1 hour    UA with Microscopic: glucose: 30, bilin: small, ketones: 5, protein: 100, RBC: 5 (H), WBC: 14 (H), squamous epithelial: 4 (H), mucous present, hyaline casts: 25 (H), amorphous crystals: few, o/w WNL    Urine culture aerobic: in process    Interventions:  1354: NS 1L IV Bolus   1557: Zosyn 4.5 g IV Infusion  1758: Cardizem 20 mg IV    Emergency Department Course:  Nursing notes and vitals reviewed. (1022) I performed an exam of the patient as documented above.     IV inserted. Medicine administered as documented above. Blood drawn. This was sent to the lab for further testing, results above.    The patient was sent for a chest X-ray while in the emergency department, findings above.     1502 I rechecked the patient and discussed the results  of her workup thus far.     1621 I rechecked the patient.    1525  I consulted with Dr. Reardon of the hospitalist services. They are in agreement to accept the patient for admission.    Findings and plan explained to the Patient who consents to admission. Discussed the patient with Dr. Reardon, who will admit the patient to a inpatient medicine bed for further monitoring, evaluation, and treatment.    Impression & Plan      CMS Diagnoses:   The patient has signs of Severe Sepsis  as evidenced by:    1. 2 SIRS criteria, AND  2. Suspected infection, AND   3. Organ dysfunction: Lactic Acid > 2.0 and ARF with Cr >2 due to infection    Time severe sepsis diagnosis confirmed:1423  10/20/19  as this was the time when Lactate resulted, and the level was > 2.0    3 Hour Severe Sepsis Bundle Completion:  1. Initial Lactic Acid Result:   Recent Labs   Lab Test 10/20/19  1733 10/20/19  1421   LACT 2.2* 2.3*     2. Blood Cultures before Antibiotics: Yes  3. Broad Spectrum Antibiotics Administered:  yes       Anti-infectives (From admission through now)    Start     Dose/Rate Route Frequency Ordered Stop    10/20/19 1426  piperacillin-tazobactam (ZOSYN) 4.5 g vial to attach to  mL bag      4.5 g  over 1 Hours Intravenous ONCE 10/20/19 1425 10/20/19 1629          4. Volume of IV Fluid administered in ED: 1.1 L     REMINDER: Please use septic shock SmartPhrase for Lactate > 4 or a patient  requiring vasopressors after initial fluid bolus (meaning persistent hypotension)      If one the following conditions is present, a 30cc/kg bolus is recommended as part of the 6 hour bundle (IBW can be used for BMI >30, or document refusal/contraindication)    1.   initial hypotension  defined as 2 bps < 90 or map < 65 in the 6hrs before or 6hrs  after time zero.    2.  Lactate >4.                 Fluid bolus not indicated: no hypotension    Severe Sepsis reassessment:  1. Repeat Lactic Acid Level: 2.2  2. MAP>65 after initial IVF bolus, will  continue to monitor fluid status and vital signs          Total Critical Care time exclusive of procedures 100  minutes    Medical Decision Making:  Sintia Connors, a 84 year old female, presents after an unwitnessed fall and increased agitation. Staff at her home noticed that she has had increased falls lately and seems more weak as she also has had a cough. Patient herself is somewhat demented and agitated which per paramedics, this is her baseline and she does not complain of pain or have other concerns. Physical exam, she does have a dry cough, vitals showed normal blood pressure, she was a bit tachycardic, O2 saturation was 89-93% on room air. Initial laboratory examination showed a white count quite elevated at 30,000, lactate came back elevated at 2.3, this is consistent with sepsis.  The patient appears to be in acute renal failure with a creatinine of 2.89, is less than 1 at baseline. IV was started, she received a liter of fluid. Cultures were drawn and sent and she was started on IV Zosyn. I spoke with Dr. Reardon on for the hospitalist service, patient will be admitted to an inpatient medicine bed for further evaluation and treatment.  Patient did go into atrial fibrillation with RVR while waiting to go to inpatient bed.  She does have a history of Afib and is anticoagulated, blood pressure is stable.  Patient given Cardizem 20 mg with only slight improvement in her hear rate, so she was started aon a Cardizem drip and will be admitted to [facility],  Dr. Reardon informed.    Diagnosis:    ICD-10-CM    1. Pneumonia of left lower lobe due to infectious organism (H) J18.1 UA reflex to Microscopic and Culture     Blood culture     Blood culture     Urine Culture Aerobic Bacterial     Urine Culture Aerobic Bacterial     CK total     CK total     Lactic acid whole blood     ISTAT gases lactate apollo POCT     ISTAT gases lactate apollo POCT     CANCELED: CK total     CANCELED: Lactic acid whole blood   2. Sepsis with acute  renal failure without septic shock, due to unspecified organism, unspecified acute renal failure type (H) A41.9     R65.20     N17.9        Disposition:  Admitted to medicine    Tony Yuen  10/20/2019    EMERGENCY DEPARTMENT  Scribe Disclosure:  I, Tony Yuen, am serving as a scribe on 10/20/2019 at 1:09 PM to personally document services performed by No att. providers found based on my observations and the provider's statements to me.      Shaun Marquis MD  10/20/19 1819       Shaun Marquis MD  10/20/19 1826

## 2019-10-20 NOTE — ED NOTES
Bed: ED30  Expected date:   Expected time:   Means of arrival:   Comments:  730  86 F confusion/agitated/cooperative  1257

## 2019-10-21 NOTE — PROGRESS NOTES
[facility]    Medicine Progress Note - Hospitalist Service       Date of Admission:  10/20/2019  Assessment & Plan   This an  85 Y/o woman with PMH significant for dementia with paranoia, HTN. Seizure disorder and CVA     who was admitted for severe sepsis ,Pneumonia with SARA    Community acquired pneumonia  - Was recently seen in the [place] ER, started on oral doxycycline for left lower lobe pneumonia.     CXR shows left lower lobe infiltrate, with leukocytosis,  Hypoxia,    - sputum culture if able.  - continue levaquin .    Severe sepsis  - Secondary to pneumonia.  - WBC 31.6. Tachycardic.  - Lactic acid  initially 2.3,....>2.2  after repeat.   - Continue IV fluids.  - Follow-up on blood culture results.    Acute kidney injury; Improving with IVF  - Likely pre-renal secondary to sepsis and Rhabdo.  - IV fluids.  - Monitor intake and output.  - Recheck labs in AM.  - Avoid nephrotoxic medications.      Atrial fibrillation/flutter  - Has a history of atrial fibrillation/flutter in the past.  - Went into atrial fibrillation with RVR while in the ER.  - continue with diltiazem infusion, pending Cardiology inputl.   - anticoagulation deferred to Cardiolgy    Rhabdomyolysis.  CK level 3693  U /litre  - Was found on the floor in her kitchen, unclear exactly how long she was on the floor.  - UA with large blood but only 5 RBCs.  -cont  IV fluids.    Vascular dementia  Paranoia  Depression with psychotic features  - Resume PTA aspirin and seroquel when verified by pharmacy.  - Plan of care discussed with her son, Pérez, by phone this afternoon.    Hypertension  - Blood pressure elevated in the ER.  - Resume PTA metoprolol when verified by pharmacy.    Seizures  - Resume PTA keppra when verified by pharmacy.    History of cerebrovascular accident  - Continue PTA aspirin.    Hypothyroidism  - Resume PTA levothyroxine when verified by pharmacy    Osteoporosis  - Resume PTA supplements upon  discharge.    GERD  - Continue PTA omeprazole when verified by pharmacy.    Deconditioning  - Consult PT/OT.    DVT Prophylaxis: Pneumatic Compression Devices  Code Status: Full Code   Expected discharge: 3-4 days, recommended to TCU vs prior living arrangement once antibiotic plan established, renal function improved and SIRS/Sepsis treated.    Entered: Odilon Chavarria MD 10/21/2019, 11:24 AM       The patient's care was discussed with the Bedside Nurse.    Odilon Chavarria MD  Hospitalist Service  [facility]    ______________________________________________________________________    Interval History   Feels better, no chest pain, no fever or chills    Data reviewed today: I reviewed all medications, new labs and imaging results over the last 24 hours. I personally reviewed no images or EKG's today.    Physical Exam   Vital Signs: Temp: 97.9  F (36.6  C) Temp src: Oral BP: 111/75 Pulse: 117 Heart Rate: 105 Resp: 19 SpO2: 91 % O2 Device: Nasal cannula Oxygen Delivery: 4 LPM  Weight: 235 lbs 11.2 oz  General Appearance: Alert in NAd  Respiratory:  CR=Ta no wheezing  Cardiovascular:  Irregular rythm  GI: Soft NT/ND + BS   Skin:warm dry      Data   Recent Labs   Lab 10/21/19  0603 10/20/19  1352   WBC 22.6* 31.6*   HGB 13.0 14.7   MCV 88 89    333    140   POTASSIUM 3.6 3.8   CHLORIDE 113* 106   CO2 18* 23   BUN 44* 43*   CR 1.86* 2.89*   ANIONGAP 10 11   FLAVIA 9.3 10.2*   * 134*

## 2019-10-21 NOTE — PLAN OF CARE
Neuro- Disoriented x4  Most Recent Vitals- Temp: 99.3  F (37.4  C) Temp src: Axillary BP: 102/58 Pulse: 134 Heart Rate: 122 Resp: 28 SpO2: (!) 89 % O2 Device: Nasal cannula Oxygen Delivery: 2 LPM  Tele/Cardiac- AFib RVR  Resp- Clear, no shortness of breath reported  Activity- Ax2  Pain- Denies  Drips- NS at 100 ml/hr, dilt drip stopped at 0140 per order parameters  Drains/Tubes- PIV  Skin- Bruising on left foot, blanchable redness of bottom  GI/- Purewick in place for incontinence  Aggression Color- Yellow  Plan- IV antibiotics  Misc- Confused, yelling for help, yelling and cursing at staff, swatting at staff during cares    Nanda Harry, RN

## 2019-10-21 NOTE — PLAN OF CARE
VSS--HR better, dilt gtt off, metoprolol increased, no c/o pain, confused and sleepy most of day, labile and yelling out at times, sitter at bedside, antibiotics for pna, fluids continue, mepilex on coccyx, continue to monitor closely.

## 2019-10-21 NOTE — CONSULTS
[facility]    Cardiology Consultation     Date of Admission:  10/20/2019    Assessment & Plan   Sintia Connors is a 84 year old female who was admitted on 10/20/2019. I was asked to see the patient for atrial fibrillation with RVR.    1. Atrial fibrillation with RVR    Tele Afib, 113 bpm    IV diltiazem given in the ED, currently on diltiazem gtt    Metoprolol 37.5 mg BID    CHADSVASC score 6. Previously on Warfarin. Discontinued in 2017 due to hemorraghic CVA. On recent medication list?     2. Left lower pneumonia with possible sepsis related to the pneumonia    Lactic 2.2    WBC 31.6     Blood cultures pending    CXR left, lower lobe infiltrate    IV fluids and antibiotics per hospitalist     3. Rhabdomyolysis     CK level 3693 unit(s)/litre     Found down d/y likely fall    IV fluids- managed by hospitalist    4. Acute Kidney Injury    Creatinine elevated at 1.86, down from 2.89 on 10/20    5. Hypertension     Controlled    Diltiazem and metoprolol     Plan:  1. Continue to hold anticoagulation given previous hemorraghic stroke in 2017.  2. Hospitalist to discuss with neurology to discuss continued aspirin due to amyloid angiopathy.  3. Increase metoprolol to 50 mg BID for rate control. Ok to give one time dose of 12.5 now to equal 50 mg.   4. Echocardiogram today     RONY Souza CNP    Code Status    Full Code    Reason for Consult   Reason for consult: atrial fibrillation with RVR    Primary Care Physician   [facility])    Chief Complaint   fall    History is obtained from the patient however, patient is a poor historian given the progression of her dementia. Some history was taken from chart review.       History of Present Illness   Sintia Connors is a 84 year old female with a past medical history significant for paranoia, depression with psychotic features, atrial fibrillation, dementia, CVA, hypothyroidism, osteoporosis, and seizures presents following an  unwitnessed fall. The patient is a poor historian given the progression of her dementia.     She was would on the floor in the kitchen of her assisted living unit by a nursing assistant. EMS was called. She was able to get up off the floor with assistance and there were no apparent injuries. It is unclear how she ended up on the floor and how long she was on the floor. A fall was suspected by paramedics. She does not recall details of the events leading up to the emergency room.    In the emergency room, she was noted to be tachycardiac. Chest x-ray showed a left lower lobe infiltrate. Lactic acid elevated at 2.3. the WBC was elevated at 31.6 and creatinine up to 2.89. She was given IV fluids and a dose of Zosyn. Of note, she was recently seen at the [facility] on 10/16/19 and was diagnosed with pneumonia. She was then started on oral doxycycline.      Past Medical History   I have reviewed this patient's medical history and updated it with pertinent information if needed.   Past Medical History:   Diagnosis Date     Atrial flutter (H) 4/12/15    post op ANW     Cerebral infarction (H)      GERD (gastroesophageal reflux disease)      PVC (premature ventricular contraction)      Rheumatoid arthritis (H)      S/P lumpectomy of breast     benign      Seizures (H)      Senile osteoporosis        Past Surgical History   I have reviewed this patient's surgical history and updated it with pertinent information if needed.  Past Surgical History:   Procedure Laterality Date     APPENDECTOMY       Athroplasty right knee  4/2015     C TOTAL KNEE ARTHROPLASTY Left 09/2015     HERNIA REPAIR      hiatial     HERNIA REPAIR      inguial hernia repair     HYSTERECTOMY TOTAL ABDOMINAL, BILATERAL SALPINGO-OOPHORECTOMY, COMBINED         Prior to Admission Medications   Prior to Admission Medications   Prescriptions Last Dose Informant Patient Reported? Taking?   Glucos-Chond-Hyal Ac-Ca Fructo (MOVE FREE JOINT HEALTH ADVANCE) TABS   Nursing Home No Yes   Sig: Take 1 tablet by mouth daily   ORDER FOR DME   No No   Sig: Equipment being ordered: transfer bath bench   ORDER FOR DME   No No   Sig: Equipment being ordered: Bath transfer bench  Fax to:[phone]   ORDER FOR DME   No No   Sig: Equipment being ordered: Shower Chair   QUEtiapine (SEROQUEL) 50 MG tablet  Nursing Home No Yes   Sig: Take 2 tablets (100 mg) by mouth 2 times daily   Specialty Vitamins Products ([employer]) [facility]  Nursing Home Yes Yes   Sig: Take 5 capsules by mouth daily   acetaminophen (TYLENOL) 500 MG tablet prn Nursing Home Yes Yes   Sig: Take 500 mg by mouth every 8 hours as needed for mild pain   aspirin (ASA) 81 MG chewable tablet  Nursing Home No Yes   Sig: CHEW 1 TABLET BY MOUTH DAILY   calcium carbonate (TUMS) 500 MG chewable tablet prn Nursing Home Yes Yes   Sig: Take 1 chew tab by mouth every 8 hours as needed for heartburn    diclofenac (VOLTAREN) 1 % GEL prn Nursing Home No Yes   Sig: APPLY 4 GRAMS TO KNEES OR 2 GRAMS TO HANDS FOUR TIMES DAILY USING ENCLOSED DOSING CARD.   Patient taking differently: Apply 4 g topically every 6 hours as needed APPLY 4 GRAMS TO KNEES OR 2 GRAMS TO HANDS FOUR TIMES DAILY USING ENCLOSED DOSING CARD.   doxycycline hyclate (VIBRAMYCIN) 100 MG capsule  Nursing Home Yes Yes   Sig: Take 100 mg by mouth 2 times daily X 7 days, started 10/17/2019   glucosamine-chondroitin 500-400 MG CAPS per capsule  Nursing Home No Yes   Sig: Take 1 capsule by mouth daily   levETIRAcetam (KEPPRA) 750 MG tablet  Nursing Home No Yes   Sig: TAKE 1 TABLET BY MOUTH TWO TIMES A DAY   levothyroxine (SYNTHROID/LEVOTHROID) 25 MCG tablet  Nursing Home No Yes   Sig: TAKE 1 TABLET (25MCG) BY MOUTH ONCE DAILY   metoprolol tartrate (LOPRESSOR) 25 MG tablet  Nursing Home No Yes   Sig: Take 1.5 tablets (37.5 mg) by mouth 2 times daily   omeprazole (PRILOSEC) 10 MG DR capsule  Nursing Home No Yes   Sig: TAKE ONE CAPSULE BY MOUTH DAILY   vitamin B-Complex  Nursing  Home No Yes   Sig: TAKE ONE TABLET BY MOUTH ONCE DAILY   vitamin D3 (CHOLECALCIFEROL) 1000 units (25 mcg) tablet  Nursing Home No Yes   Sig: Take 1 tablet (1,000 Units) by mouth daily      Facility-Administered Medications: None     Allergies   Allergies   Allergen Reactions     Lorazepam Visual Disturbance     Severe hallucinations;       Social History   I have reviewed this patient's social history and updated it with pertinent information if needed. Sintia Connors  reports that she quit smoking about 36 years ago. She smoked 0.00 packs per day. She has never used smokeless tobacco. She reports that she does not drink alcohol or use drugs.    Family History   I have reviewed this patient's family history and updated it with pertinent information if needed.   Family History   Problem Relation Age of Onset     Heart Disease No family hx of      Arrhythmia No family hx of        Review of Systems   The 10 point Review of Systems is negative other than noted in the HPI or here.     Physical Exam   Temp: 97.9  F (36.6  C) Temp src: Oral BP: 103/62 Pulse: 110 Heart Rate: 111 Resp: 20 SpO2: 92 % O2 Device: Nasal cannula Oxygen Delivery: 4 LPM  Vital Signs with Ranges  Temp:  [97.9  F (36.6  C)-99.3  F (37.4  C)] 97.9  F (36.6  C)  Pulse:  [] 110  Heart Rate:  [105-133] 111  Resp:  [18-42] 20  BP: ()/() 103/62  SpO2:  [89 %-96 %] 92 %  235 lbs 11.2 oz    Constitutional: NAD   Respiratory: clear throughout  Cardiovascular: irregularly irregular, no murmur  GI: soft, nontender  Skin: warm, dry  Musculoskeletal: no edema  Neurologic: confused to time, place, location      Data   Most Recent 3 CBC's:  Recent Labs   Lab Test 10/21/19  0603 10/20/19  1352 09/20/18   WBC 22.6* 31.6* 8.4   HGB 13.0 14.7 13.8   MCV 88 89 90    333 198     Most Recent 3 BMP's:  Recent Labs   Lab Test 10/21/19  0603 10/20/19  1352 09/20/18    140 143   POTASSIUM 3.6 3.8 4.2   CHLORIDE 113* 106 111   CO2 18* 23 24    BUN 44* 43* 13   CR 1.86* 2.89* 0.61   ANIONGAP 10 11 8.0   FLAVIA 9.3 10.2* 8.2*   * 134* 97

## 2019-10-21 NOTE — PLAN OF CARE
PT/OT: Per RN, pt is not appropriate for therapies this afternoon due to mentation and paranoia. She has been yelling, cursing and swatting at care staff.

## 2019-10-21 NOTE — PHARMACY-ADMISSION MEDICATION HISTORY
Admission medication history interview status for the 10/20/2019  admission is complete. See EPIC admission navigator for prior to admission medications     Medication history source reliability:Good    Actions taken by pharmacist (provider contacted, etc):information from [facility] med list.      Additional medication history information not noted on [facility] med list :duplicate therapy (glucosamine/chrondroitin and move-free joint tablets) are both listed on med list. Women's erasmo Hansel Misc dose is listed as 5 capsules daily.    Medication reconciliation/reorder completed by provider prior to medication history? No    Time spent in this activity: 15 minutes    Prior to Admission medications    Medication Sig Last Dose Taking? Auth Provider   acetaminophen (TYLENOL) 500 MG tablet Take 500 mg by mouth every 8 hours as needed for mild pain prn Yes Reported, Patient   aspirin (ASA) 81 MG chewable tablet CHEW 1 TABLET BY MOUTH DAILY  Yes Brie Newberry APRN CNP   calcium carbonate (TUMS) 500 MG chewable tablet Take 1 chew tab by mouth every 8 hours as needed for heartburn  prn Yes Lazaro Gupta MD   diclofenac (VOLTAREN) 1 % GEL APPLY 4 GRAMS TO KNEES OR 2 GRAMS TO HANDS FOUR TIMES DAILY USING ENCLOSED DOSING CARD.  Patient taking differently: Apply 4 g topically every 6 hours as needed APPLY 4 GRAMS TO KNEES OR 2 GRAMS TO HANDS FOUR TIMES DAILY USING ENCLOSED DOSING CARD. prn Yes Lazaro Gupta MD   doxycycline hyclate (VIBRAMYCIN) 100 MG capsule Take 100 mg by mouth 2 times daily X 7 days, started 10/17/2019  Yes Unknown, Entered By History   Glucos-Chond-Hyal Ac-Ca Fructo (MOVE FREE JOINT HEALTH ADVANCE) TABS Take 1 tablet by mouth daily  Yes Brie Newberry APRN CNP   glucosamine-chondroitin 500-400 MG CAPS per capsule Take 1 capsule by mouth daily  Yes Brie Newberry APRN CNP   levETIRAcetam (KEPPRA) 750 MG tablet TAKE 1 TABLET BY MOUTH TWO TIMES A DAY  Yes Brie Newberry  RONY Manuel CNP   levothyroxine (SYNTHROID/LEVOTHROID) 25 MCG tablet TAKE 1 TABLET (25MCG) BY MOUTH ONCE DAILY  Yes Sharee Ewing APRN CNP   metoprolol tartrate (LOPRESSOR) 25 MG tablet Take 1.5 tablets (37.5 mg) by mouth 2 times daily  Yes Brie Newberry APRN CNP   omeprazole (PRILOSEC) 10 MG DR capsule TAKE ONE CAPSULE BY MOUTH DAILY  Yes Brie Newberry APRN CNP   QUEtiapine (SEROQUEL) 50 MG tablet Take 2 tablets (100 mg) by mouth 2 times daily  Yes Brie Newberry APRN CNP   Specialty Vitamins Products (WOMENS JOHN [employer]) MISC Take 5 capsules by mouth daily  Yes Unknown, Entered By History   vitamin B-Complex TAKE ONE TABLET BY MOUTH ONCE DAILY  Yes Brie Nebwerry APRN CNP   vitamin D3 (CHOLECALCIFEROL) 1000 units (25 mcg) tablet Take 1 tablet (1,000 Units) by mouth daily  Yes Brie Newberry APRN CNP   ORDER FOR DME Equipment being ordered: Shower Chair   Lazaro Gupta MD   ORDER FOR DME Equipment being ordered: Bath transfer bench  Fax to:[phone]   Lazaro Gupta MD   ORDER FOR DME Equipment being ordered: transfer bath bench   Lazaro Gupta MD

## 2019-10-22 NOTE — PROVIDER NOTIFICATION
MD Notification    Notified Person: MD    Notified Person Name: Shala    Notification Date/Time: 10/22/19 1015    Notification Interaction: paged    Purpose of Notification: Critical Lab: CK 1,137. (lower than previous level)    Orders Received:    Comments:

## 2019-10-22 NOTE — PROGRESS NOTES
[facility]    Medicine Progress Note - Hospitalist Service       Date of Admission:  10/20/2019  Assessment & Plan   Sintia Connors is a 85 Y/o woman with PMH significant for dementia with paranoia, HTN. Seizure disorder and CVA who was admitted for severe sepsis ,Pneumonia with SARA     LLL Community acquired pneumonia  Sepsis: Was recently seen in the [place] ER, started on oral doxycycline for left lower lobe pneumonia. I do not per review longterm notes patient has issues with medication complience. CXR on admission with LLL infiltrate. WBC 31.6. LA 2.3  - Started on Levaquin on admission. Continue  - Afebrile. WBC trending down. LA normalized  - BC NGTD  - Continue Levaquin and IVF.   - Follow CBC    Abdominal Distention/Pain: Abdominal distention noted. Patient denies pain but TPP. Difficult history due to dementia. BS diminished. WBC remains high. LA normalized  - Check Abd Xray  - Bowel Regimen  - Consider CT pending renal function and Abd XR.   Addendum: Abd XR non obstructive. Will observe with bowel regimen. If worsening abdominal pain or clinical deterioration consider abdominal CT to further eval.      Acute kidney injury; Improving with IVF  - Likely pre-renal secondary to sepsis and Rhabdo.  - IV fluids, will reduce rate to 75 ml.hr  - Monitor intake and output.  - Avoid nephrotoxic medications.      Atrial fibrillation/flutter : Has a history of atrial fibrillation/flutter in the past.Went into atrial fibrillation with RVR while in the ER. PTA regimen includes Metoprolol 37.5. Not on anticoagulation due to history of hemorraghic CVA and questionable amyloid angiopathy.   - Started on diltiazem gtt on admission. Metoprolol up titrated to 75 mg bid and gtt weaned.   - RVR likely due to acute illness.   - Appreciate Cardiology input   - Continue PTA ASA     Rhabdomyolysis.  CK level 3693  U /litre. Was found on the floor in her kitchen, unclear exactly how long she was on the floor UA  with large blood but only 5 RBCs.  - Improving with IVF, CK 1137 today. Cr near baseline  - Reduce IVF to 75 ml/hr      Vascular dementia with behavioral disturbances  Paranoia  Depression with psychotic features  - Continue PTA Seroquel. 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    Jaki Scott PA-C  Hospitalist Service  [facility]    ______________________________________________________________________    Interval History   Alert to self and place. States we are trying to kill her. Denies abdominal pain. Wants ice cream.    Data reviewed today: I reviewed all medications, new labs and imaging results over the last 24 hours. I personally reviewed no images or EKG's today.    Physical Exam   Vital Signs: Temp: 99.9  F (37.7  C) Temp src: Axillary BP: 91/70 Pulse: 114 Heart Rate: 111 Resp: 26 SpO2: 95 % O2 Device: Nasal cannula Oxygen Delivery: 5 LPM  Weight: 238 lbs 8 oz  Constitutional: Awake, irritable.   HEENT: Head normocephalic, atraumatic. Eyes sclera non icteric.   Respiratory: Normal effort, symmetric expansion, diminished at bases  Cardiovascular: Tachycardic, irregular. No murmurs.   GI:  Distended. Diminished, high pitched BS  MSK: LE without edema. Dorsalis pedis pulse palpated bilaterally.   Skin/Integumen: Clear  Neuro: CN II-XII grossly intact  Psych:  Alert and oriented x 2. Normal affect      Data   Recent Labs   Lab  10/22/19  1223 10/22/19  0840 10/21/19  0603 10/20/19  1352   WBC  --  20.3* 22.6* 31.6*   HGB  --  12.8 13.0 14.7   MCV  --  88 88 89   PLT  --  175 253 333   NA  --  142 141 140   POTASSIUM  --  3.3* 3.6 3.8   CHLORIDE  --  118* 113* 106   CO2  --  18* 18* 23   BUN  --  30 44* 43*   CR  --  0.95 1.86* 2.89*   ANIONGAP  --  6 10 11   FLAVIA  --  8.6 9.3 10.2*   GLC  --  135* 115* 134*   ALBUMIN 2.0*  --   --   --    PROTTOTAL 6.1*  --   --   --    BILITOTAL 0.6  --   --   --    ALKPHOS 101  --   --   --    ALT 89*  --   --   --    *  --   --   --      Recent Results (from the past 24 hour(s))   XR Abdomen Port 1 View    Narrative    ABDOMEN ONE VIEW PORTABLE October 22, 2019 1:10 PM     HISTORY: Abdominal distention.    COMPARISON: August 24, 2006.       Impression    IMPRESSION: Minimal amount of stool. Nonobstructed bowel gas pattern.

## 2019-10-22 NOTE — PLAN OF CARE
PT: Eval orders received, chart reviewed. Per chart pt has been agitated and aggressive towards staff. Not appropriate for PT evaluation this day. RN informed.

## 2019-10-22 NOTE — PLAN OF CARE
OT: Reviewed chart, talked to nursing.  Pt has been agitated and confused, not appropriate for evaluation today.

## 2019-10-22 NOTE — PLAN OF CARE
Pt stable over night, no c/o pain or SOB per Pt.  Pt is alert to self only.  Pt is agitated and confused to place, time and situation.  She will swear at staff and punch at them at times.  She is fearful and upset with most cares.  Pt require an Oxymask r/t mouth breathing.  Will continue to monitor.

## 2019-10-22 NOTE — PLAN OF CARE
"Neuro:oriented to self only, stating \"I want to die\"    CV/Rhythm:a fib RVR, PO metoprolol  Resp/02:7 L NC 91%  GI/Diet:c/o abd discomfort with turning/repo, unable to assess further, no further c/o, left sticky note for MD for bowel regime as no BM noted  :pure wick changed  Skin/Incisions/Sites:coccynx mepilex, fissure to midine open, bruising left buttocks, toes L  Pulses/CMS:intact  Edema:1+  Activity/Falls Risk:total, 2 to turn  Lines/Drains/IVs:PIV, purewick  Labs/BGM:-  Test/Procedures:-  VS/Pain:HR a fib , abd pain unable to rate, 91% 7 L  DC Plan:assisted living, social work consult  Other:MD talked with son re: code status, POC    "

## 2019-10-22 NOTE — PLAN OF CARE
Up to unit 1730.  A/O x 1.  Paranoid, forgetful, sitter at bedside.  Denies pain.  4L O2 per NC, HR high 90's - tele A-fib CVR.  Tolerating regular diet. A2, Lift, not out of bed since arrival on unit.  Turned, repositioned q 2 h. Pure wick in place, good urine output. Abdomen distended, taught, high pitched bowel sounds. Last BM unknown. Bruised L toes, BLE pulses 2+, doppler used.

## 2019-10-22 NOTE — PROGRESS NOTES
Additional 25 mg Metoprolol given for increase in HR. Afib RVR varies between 100-120's.Will get 75 mg tonight.  Resp 22-30 depending on if pt is talking/upset. On 4 L NC. Lactic fired due to HR, came back normal. Report called to Anamika.

## 2019-10-22 NOTE — PROGRESS NOTES
[facility]  Cardiology Progress Note  Date of Service: 10/22/2019    Assessment & Plan    Sintia Cononrs is a 84 year old female with past medical history significant for paranoia, depression with psychotic features, atrial fibrillation, dementia, CVA, hypothyroidism, osteoporosis, and seizures was admitted on 10/20/2019 with an unwitnessed fall.     Assessment and Plan:   1. Atrial fibrillation with RVR    Tele Afib, 111 bpm    Diltiazem gtt weaned off     Metoprolol 50 mg BID    Echocardiogram showed normal LV function with LVEF 60-65%. RV mod-severely dilated. Mildly decreased RV function     CHADSVASC score 6. Previously on Warfarin. Discontinued in 2017 due to hemorraghic CVA. On recent medication list?      2. Left lower pneumonia with possible sepsis related to the pneumonia    Lactic 1.2    WBC 20.3, down from 31.6     Blood cultures show no growth     CXR left, lower lobe infiltrate    IV fluids and IV Zosyn per hospitalist      3. Acute renal failure with rhabdomyolysis     Creatinine 1.86    CK level 3693 unit(s)/litre     Found down d/t likely fall    IV fluids- managed by hospitalist     4. Advance dementia with seizures    Keppra per hospitalist      5. Hypertension     Controlled    Metoprolol 50 mg BID     Plan:  1. Continue to hold anticoagulation given previous hemorraghic stroke in 2017.  2. Hospitalist to discuss with neurology to discuss continued aspirin due to amyloid angiopathy.   3. Increase metoprolol to 75 mg BID for rate control.   4. Can be transferred out of unit per cardiology standpoint         RONY Souza CNP  Pager:  [phone]   Text Page  (7am - 5pm, M-F)      Interval History   Resting in bed. No complaints     Physical Exam   Temp: 99.9  F (37.7  C) Temp src: Axillary BP: 111/72 Pulse: 106 Heart Rate: 92 Resp: 24 SpO2: 92 % O2 Device: Nasal cannula Oxygen Delivery: 5 LPM  Vitals:    10/21/19 0614 10/22/19 0600   Weight: 106.9 kg (235 lb 11.2 oz) 108.2  kg (238 lb 8 oz)       Constitutional:   NAD   Skin:   Warm and dry   Head:   Nontraumatic   Neck:   supple, symmetrical, trachea midline   Lungs:   symmetric, clear to auscultation   Cardiovascular:   irregularly irregular rhythm, normal S1 and S2 and no murmur noted   Abdomen:   Benign   Extremities and Back:   No edema   Neurological: confused           Medications     sodium chloride 100 mL/hr at 10/22/19 0246       levETIRAcetam  500 mg Oral BID     levofloxacin  500 mg Intravenous Q48H     levothyroxine  25 mcg Oral QAM AC     metoprolol tartrate  50 mg Oral BID     omeprazole  10 mg Oral Daily     QUEtiapine  100 mg Oral BID     sodium chloride (PF)  3 mL Intracatheter Q8H       Data     Most Recent 3 CBC's:  Recent Labs   Lab Test 10/22/19  0840 10/21/19  0603 10/20/19  1352   WBC 20.3* 22.6* 31.6*   HGB 12.8 13.0 14.7   MCV 88 88 89    253 333     Most Recent 3 BMP's:  Recent Labs   Lab Test 10/22/19  0840 10/21/19  0603 10/20/19  1352    141 140   POTASSIUM 3.3* 3.6 3.8   CHLORIDE 118* 113* 106   CO2 PENDING 18* 23   BUN PENDING 44* 43*   CR PENDING 1.86* 2.89*   ANIONGAP PENDING 10 11   FLAVIA PENDING 9.3 10.2*   GLC PENDING 115* 134*

## 2019-10-22 NOTE — PROGRESS NOTES
Pt reports abd pain on turning. Vague, unable to elicit location or type of pain. No BM recorded and pt unable to report. Sticky note left for MD. No PRN bowel regimen. BS x 4 quadrants.

## 2019-10-22 NOTE — PLAN OF CARE
"Patient is alert and oriented to self. Yells out and cusses at staff but is cooperative at times.  Bruising on Left foot doppler pulses, red coccyx, foam intact. Denies pain but does yell out when turning. Patient is up with Lift.  pt voiding- incont, using external pur wick. Abdomin taught, hypoactive, Xray shows no obstruction, gave senna and myralax. Pt states she \"wants to die\". Afib . Metoprolol increased tonight. K+ 3.3 gave 40 meq per order. Weaned O2 down to 4 L. Plan is to transfer to station 88. Message left for son, Pérez.    "

## 2019-10-22 NOTE — PROGRESS NOTES
SW:  D: Consult for discharge planning to be completed. Per chart review, the pt has been too agitated to work with therapies, awaiting PT/OT eval of pt needs upon discharge. Pt is a resident of [facility]. SW left VM for [facility] RN requesting return call to [facility] about pt's current services and discharge plans, awaiting return call. Left VM for son Pérez to inquire about preferences and discharge plans, awaiting return call.    P: KELLI will continue to follow for discharge needs.    Felipa Clements, MUSTAPHA   [phone]  [facility]

## 2019-10-22 NOTE — PROVIDER NOTIFICATION
MD Notification    Notified Person: MD    Notified Person Name: Jaki RHOADES    Notification Date/Time: 10/22/19 2382    Notification Interaction:paged    Purpose of Notification: Pt BS are hypo active, taught. Need Bowel regimen please    Orders Received:    Comments:

## 2019-10-22 NOTE — PROVIDER NOTIFICATION
MD Notification    Notified Person: MD    Notified Person Name: Macy BENJA    Notification Date/Time: 10/22/19 1500    Notification Interaction:talked with  NP    Purpose of Notification: HR consistently staying 120-130 the last hour.     Orders Received: give extra 25mg Metoprolol.      Comments:

## 2019-10-23 NOTE — PROGRESS NOTES
[facility]  Cardiology Progress Note  Date of Service: 10/23/2019    Assessment & Plan    Sintia Connors is a 84 year old female with past medical history significant for paranoia, depression with psychotic features, atrial fibrillation, dementia, CVA, hypothyroidism, osteoporosis, and seizures was admitted on 10/20/2019 with an unwitnessed fall.     Assessment and Plan:   1. Atrial fibrillation with RVR    Tele Afib, 109 bpm    Diltiazem gtt weaned off     Metoprolol 75 mg BID    Echocardiogram showed normal LV function with LVEF 60-65%. RV mod-severely dilated. Mildly decreased RV function     CHADSVASC score 6. Previously on Warfarin. Discontinued in 2017 due to hemorraghic CVA/amyloid angiopathy    Increase metoprolol to 100mg bid  Add dig if needed       2. Left lower pneumonia with possible sepsis related to the pneumonia    Lactic 1.2    WBC decreasing    Low grade fever    Blood cultures show no growth     CXR left, lower lobe infiltrate    IV fluids and IV Zosyn per hospitalist      3. Acute renal failure with rhabdomyolysis     Improved, crea normal     4. Advance dementia with seizures    Keppra per hospitalist         Plan:  1. Increase metoprolol to 100mg bid as tolerated  2. Can add dig if needed at 0.125mcg every other day  3. If rate control still a problem after treatment of sepsis, can add diltiazem  4. Overall progrnosis guarded  5. Needs extensive discussion with son regarding goals of care and if palliative care consult would help in assisting with this discussion. Will ask Hospitalist to pursue this    We will sign off. Recall if questions.        Shoaib Luis MD        Interval History   Resting in bed. No complaints     Physical Exam   Temp: 96.2  F (35.7  C) Temp src: Oral BP: 129/78 Pulse: 124 Heart Rate: 126 Resp: 28 SpO2: 93 % O2 Device: Nasal cannula Oxygen Delivery: 4 LPM  Vitals:    10/21/19 0614 10/22/19 0600   Weight: 106.9 kg (235 lb 11.2 oz) 108.2 kg (238  lb 8 oz)       Constitutional:   NAD   Skin:   Warm and dry   Head:   Nontraumatic   Neck:   supple, symmetrical, trachea midline   Lungs:   symmetric, clear to auscultation   Cardiovascular:   irregularly irregular rhythm, normal S1 and S2 and no murmur noted   Abdomen:   Benign   Extremities and Back:   No edema   Neurological: confused           Medications     sodium chloride 75 mL/hr at 10/22/19 2209       aspirin  81 mg Oral Daily     levETIRAcetam  750 mg Oral BID     levofloxacin  500 mg Intravenous Q48H     levothyroxine  25 mcg Oral QAM AC     metoprolol tartrate  75 mg Oral BID     omeprazole  10 mg Oral Daily     polyethylene glycol  17 g Oral BID     QUEtiapine  100 mg Oral BID     sennosides  1-2 tablet Oral BID     sodium chloride (PF)  3 mL Intracatheter Q8H       Data     Most Recent 3 CBC's:  Recent Labs   Lab Test 10/22/19  0840 10/21/19  0603 10/20/19  1352   WBC 20.3* 22.6* 31.6*   HGB 12.8 13.0 14.7   MCV 88 88 89    253 333     Most Recent 3 BMP's:  Recent Labs   Lab Test 10/22/19  0840 10/21/19  0603 10/20/19  1352    141 140   POTASSIUM 3.3* 3.6 3.8   CHLORIDE 118* 113* 106   CO2 18* 18* 23   BUN 30 44* 43*   CR 0.95 1.86* 2.89*   ANIONGAP 6 10 11   FLAVIA 8.6 9.3 10.2*   * 115* 134*

## 2019-10-23 NOTE — PLAN OF CARE
A/O x 1.  Tele A-fib RVR, HR low 100's.  O2 low 90's on 4L O2 via NC.   Incontinent of 1 large, soft BM.  Blanchable reddness on coccyx, Turned repositioned q 2 h.  Purewick in place, good urine output.  Regular diet, poor appetite.  Refusing medications at times. L. PIV SL.  Continue to wean O2 as able.

## 2019-10-23 NOTE — PROGRESS NOTES
SW:  D:  Received a call back from patent's son Pérez who states that he would like patient to return to her assisted living apartment on discharge.  Patient's son states that patient has been to tcu a few times in the past and if at all possible, he would prefer for patient to return directly home with homecare, if possible.  Explained that patient has not had therapy as of yet so explained that I can update him with the recommendations after patient has had therapy.  Patient's son also states that patient has been given her eviction notice from the assisted living.  Patient is suppose to be out by October 31.  Patient's son states that he has been working with Therese, the ombudsman for assisted livings, to inquire as to whether this can be extended given the fact that patient is in the hospital.    P:  Will continue to follow.    VELASQUEZ Blas, [facility]  [phone]  [facility]

## 2019-10-23 NOTE — PROGRESS NOTES
[facility]    Medicine Progress Note - Hospitalist Service       Date of Admission:  10/20/2019  Assessment & Plan   Sintia Connors is a 85 Y/o woman with PMH significant for dementia with paranoia, HTN. Seizure disorder and CVA who was admitted for severe sepsis ,Pneumonia with SARA     LLL Community acquired pneumonia  Sepsis: Was recently seen in the [place] ER, started on oral doxycycline for left lower lobe pneumonia. I do not per review [facility] notes patient has issues with medication complience. CXR on admission with LLL infiltrate. WBC 31.6. LA 2.3  - Started on Levaquin on admission. Continue  - Afebrile. WBC trending down. LA normalized  - BC NGTD  - Continue Levaquin  - Follow CBC    Acute Hypoxic Respiratory Failure  Acute HFpEF: Still requiring 4L NC. Up 3-4L in the setting of volume resuscitation from Rhabdomyolysis. Cr normalized. CK < 1000. CXR with mild edema  - Stop IVF  - Lasix 20 mg IV x 1  - Wean O2    Abdominal Distention/Pain  Constipation: Abdominal distention noted. Patient denies pain but TPP. Difficult history due to dementia. BS diminished. WBC remains high. LA normalized. Abd Xray 10/22 with normal gas pattern  - Bowel Regimen Ordered, patient intermittently compliant due to dementia. Did have BM 10/23 per RN  - Monitor     Acute kidney injury, resolved: Baseline Cr 0.6-0.8  - Cr 2.89 on admission  - Likely pre-renal secondary to sepsis and Rhabdo.  - IVF stopped 10/22  - Monitor with Lasix      Atrial fibrillation with RVR: Has a history of atrial fibrillation/flutter in the past.Went into atrial fibrillation with RVR while in the ER. PTA regimen includes Metoprolol 37.5. Not on anticoagulation due to history of hemorraghic CVA and questionable amyloid angiopathy.   - Started on diltiazem gtt on admission. Metoprolol up titrated to 100 mg bid and gtt weaned.   - RVR likely due to acute illness.   - Appreciate Cardiology input, they have since signed off  - Continue PTA  ASA     Rhabdomyolysis.  CK level 3693  U /litre. Was found on the floor in her kitchen, unclear exactly how long she was on the floor UA with large blood but only 5 RBCs.  - Improved with IVF.  today. Cr at baseline  - IVF d/c 10/23    Vascular dementia with behavioral disturbances  Paranoia  Depression with psychotic features  - Continue PTA Seroquel. 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   Entered: Jaki Scott PA-C 10/23/2019, 2:08 PM       The patient's care was discussed with the Bedside Nurse and Patient.    Jaki Scott PA-C  Hospitalist Service  [employer]    ______________________________________________________________________    Interval History   Calmer today on interview. Sitter still at bedside. Patient denies SOB or pain. RN states had large BM    Data reviewed today: I reviewed all medications, new labs and imaging results over the last 24 hours. I personally reviewed no images or EKG's today.    Physical Exam   Vital Signs: Temp: 96.2  F (35.7  C) Temp src: Oral BP: 129/78 Pulse: 124 Heart Rate: 126 Resp: 28 SpO2: 93 % O2 Device: Nasal cannula Oxygen Delivery: 4 LPM  Weight: 238 lbs 8 oz  Constitutional: Alert, resting comfortably in NAD  HEENT: Head normocephalic, atraumatic. Eyes sclera non icteric.   Respiratory: Normal effort, symmetric expansion, diminished at bases  Cardiovascular: Currently rate controlled, irregular  GI: Distended, normal BS, mild tenderness  MSK: LE without edema. Dorsalis pedis pulse  palpated bilaterally.   Skin/Integumen: Clear  Neuro: CN II-XII grossly intact  Psych:  Alert and oriented x 1. Normal affect      Data   Recent Labs   Lab 10/23/19  1111 10/22/19  1223 10/22/19  0840 10/21/19  0603   WBC 18.2*  --  20.3* 22.6*   HGB 13.1  --  12.8 13.0   MCV 89  --  88 88     --  175 253     --  142 141   POTASSIUM 3.6  --  3.3* 3.6   CHLORIDE 119*  --  118* 113*   CO2 18*  --  18* 18*   BUN 23  --  30 44*   CR 0.75  --  0.95 1.86*   ANIONGAP 6  --  6 10   FLAVIA 9.1  --  8.6 9.3   *  --  135* 115*   ALBUMIN  --  2.0*  --   --    PROTTOTAL  --  6.1*  --   --    BILITOTAL  --  0.6  --   --    ALKPHOS  --  101  --   --    ALT  --  89*  --   --    AST  --  109*  --   --      Recent Results (from the past 24 hour(s))   XR Chest Port 1 View    Narrative    CHEST ONE VIEW PORTABLE   10/23/2019 9:45 AM     HISTORY: Hypoxia.    COMPARISON: 10/20/2019      Impression    IMPRESSION: Prominent heart size similar to prior. There is fullness  of the right hilum that is also similar to prior. No airspace  consolidation, pneumothorax, or pleural effusion. Mild interstitial  thickening may be related to early edema. Follow-up PA and lateral  recommended when appropriate.    VLAD ARGUELLO MD

## 2019-10-23 NOTE — PLAN OF CARE
A/O x 1. VSS, hypertensive, 4L NC with humidification. A2,  refused T/R. Paranoid, forgetful, sitter at bedside.  Denies pain. -130 with cares - tele A-fib.  Tolerating regular diet, took meds crushed with apple sauce. New mepilex on coccyx, red blanchable, not open, bruised L toes. Pure wick in place, good urine output. Abdomen distended, taught, high pitched bowel sounds. Last BM unknown.

## 2019-10-23 NOTE — PLAN OF CARE
OT/PT - Chart reviewed, Eval attempted - patient declines any OOB activity stating she wants to go to bed, per discussion with nursing patient agitated this morning. During PT attempt patient appeared calm but not cooperative. Sitter at bedside.

## 2019-10-24 NOTE — PLAN OF CARE
Discharge Planner OT   Patient plan for discharge: Unknown.  Current status: Order rec'd. Patient has been evaluated by PT who reports patient is using the lift at this time, is needing total cares and is not a good rehab candidate.   Barriers to return to prior living situation: Defer to PT.   Recommendations for discharge: Defer to PT.   Rationale for recommendations: OT eval not indicated at this time as PT is meeting patients mobility needs here, needs total care with ADL. Order completed.        Entered by: Scarlett Ramirez 10/24/2019 10:53 AM

## 2019-10-24 NOTE — PROGRESS NOTES
Briefly,   Sintia Connors is a 84 year old female PMH significant for dementia with paranoia, HTN. Seizure disorder and CVA who was admitted for severe sepsis.   Patient found to be hypoxic and found to have subsegmental PEs.   Question for stroke team is AC risk given her significant cerebral amyloid angiopathy.      I attempted to go see the patient tonight, however she is at radiology, will try to catch her in between studies, or see formally tomorrow morning.    Nevertheless, I spoke to hospitalist on call, and after reviewing her 2013 MRI brain which showed significant cerebral amyloid angiopathy would recommend to obtain LE dopplers and place IVC filter over AC as she would be at a significant risk of intracranial bleeding given CAA findings on MRI brain.

## 2019-10-24 NOTE — PLAN OF CARE
A&Ox1 (self).  VSS, 1L NC.  Turn and repo Q2hrs as patient allows; she has not been out of bed, will require mechanical lift for transfers. Pt sat and dangled legs on side of bed once during shift. Incontinent BM this shift. Skin tears x 2 to R side of back and R buttock. TELE: afib RVR.  L PIV SL.  Patient can be verbally aggressive and yells at staff.  1:1 sitter at the bedside for safety. CT scan performed on chest-came back positive with bilateral PE. US scheduled to check for BLE DVT's. Potassium needs to be replaced. Discharge pending progress.

## 2019-10-24 NOTE — PROGRESS NOTES
[facility]    Hospitalist Progress Note      Assessment & Plan   Sintia Connors is a 84 year old female with PMH significant for dementia with paranoia, HTN. Seizure disorder and CVA who was admitted for severe sepsis ,Pneumonia with SARA     LLL Community acquired pneumonia  Sepsis  She was recently seen in the [place] ER, started on oral doxycycline for left lower lobe pneumonia. Per review intermediate notes patient has issues with medication complience. CXR on admission with LLL infiltrate. WBC 31.6. LA 2.3  - Started on Levaquin on admission. Continue  - Afebrile. WBC still high, but down to 18 from admission. LA normalized  - BC NGTD  - Follow CBC    ADDEDNUM:  CT reviewed showed bilateral subsegmental PEs.  Will obtain LE doppler.   Will consult/discuss with neurology regarding anticoagulation given prior MRIs concerning for amyloid angiopathy and previous intracranial bleed.     ADDENDUM:  Discussed case with Dr. Mills from neurology and formal NCC consult placed for further recommendation of anticoagulation use. Will hold off anticoagulation until evaluated by neuro.      Addendum:   Discussed with neuro, advise against anticoagulation due to multiple amyloid spots on prior MRIs and risk of intracranial bleed. LE doppler pending, may need IVC filter    Acute Hypoxic Respiratory Failure  Acute HFpEF:   Still requiring 4L NC,however unclear if attempts made to wean down oxygen, sats on 4L range from 93-96%.  She was up 3-4L in the setting of volume resuscitation from Rhabdomyolysis. Cr normalized. CK < 1000. CXR with mild edema on 10/23.  IVF stopped and received Lasix 20mg IV on 10/23 with decent urine output.    - given ongoing need for oxygen and difficult to control HR, will obtain chest CT PE to r/o PE  - if PE + on CT, will need to get neuro input regarding anticoagulation due to prior hx of intracranial bleed with supra therapeutic INR and MRI from 2013 with possible amyloid angiopathy  -  Wean O2 as able    Atrial fibrillation with RVR:   Has a history of atrial fibrillation/flutter in the past.Went into atrial fibrillation with RVR while in the ER. PTA regimen includes Metoprolol 37.5. Not on anticoagulation due to history of hemorraghic CVA and questionable amyloid angiopathy.   - HR still remains elevated as high as 130s this morning.   - increase Metoprolol to 100mg BID from 75mg BID  - will add Digoxin once Potassium is replaced  - Appreciate Cardiology input, they have since signed off  - Continue PTA ASA    Hypokalemia:  - replace per protocol  - check magnesium    Abdominal Distention/Pain  Constipation:   Abdominal distention noted. Difficult history due to dementia. WBC remains high. LA normalized. Abd Xray 10/22 with normal gas pattern  - Monitor., currently having bowel movement with stool softners on board     Acute kidney injury, resolved: Baseline Cr 0.6-0.8  - Cr 2.89 on admission.  Likely pre-renal secondary to sepsis and Rhabdo.  - IVF stopped 10/22  - cr stable at baseline, will monitor after IV contrast adminstration      Rhabdomyolysis.  CK level 3693  U /litre. Was found on the floor in her kitchen, unclear exactly how long she was on the floor UA with large blood but only 5 RBCs.  - Improved with IVF. .Cr at baseline  - IVF d/c 10/23     Vascular dementia with behavioral disturbances  Paranoia  Depression with psychotic features  - Continue PTA Seroquel. Appears to be at baseline per review [facility] records  - patient not cooperative with cares or turns, refusing to get out of bed.    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   Also attempted calling Cassandra (niece) who is listed  as second HCA, but someone answered and I was told to call back in one hour or so.  Will try to call back then.     ADDENDUM:  Spoke with Cassandra (niece) around 1230pm. Discussed with Cassandra regarding patient's current illness and no significant improvement since admission including uncontrolled HR, ongoing need for oxygen. Also mentioned to her patient's not much cooperative with nursing cares.  Updated with her regarding CT chest that is pending. Spoke to her will continue current treatment, follow CT and if patient does not improve, then need to discuss goals of care since she is still full code.  Cassandra will visit patient today.         Communications: discussed with nursing staff.     DVT Prophylaxis: Pneumatic Compression Devices  Code Status: Full Code    Disposition: Expected discharge in 3-4 days with ability to wean off oxygen, controlled HR    Efraín Correa MD  Text Page  (7am to 6pm)    Interval History   During visit, patient awake and states she wants to die but does not elaborate. She refused to get out of bed or refusing cares per nursing staff. She does not complain of feeling short of breath or chest pain. However she is quite irritable with all questions. She is not oriented.     Atttempt calling family as above, but not able to get hold off at this time.     -Data reviewed today: I reviewed all new labs and imaging results over the last 24 hours. I personally reviewed no images or EKG's today.    Physical Exam   Temp: 97.2  F (36.2  C) Temp src: Axillary BP: (!) 149/70 Pulse: 111 Heart Rate: 120 Resp: 15 SpO2: 96 % O2 Device: Nasal cannula Oxygen Delivery: 4 LPM  Vitals:    10/21/19 0614 10/22/19 0600   Weight: 106.9 kg (235 lb 11.2 oz) 108.2 kg (238 lb 8 oz)     Vital Signs with Ranges  Temp:  [97.2  F (36.2  C)-98.3  F (36.8  C)] 97.2  F (36.2  C)  Pulse:  [] 111  Heart Rate:  [120] 120  Resp:  [15-20] 15  BP: ()/(57-75) 149/70  SpO2:  [94 %-98 %] 96 %  I/O last 3 completed  shifts:  In: 940 [P.O.:940]  Out: 1400 [Urine:1400]    Constitutional: Awake, not oriented  Respiratory: Bibasilar crackles, no wheezing  Cardiovascular: irregular, tachy  GI: soft, distended, no significant tenderness on exam  Skin/Integumen: warm and dry  Other:      Medications       aspirin  81 mg Oral Daily     levETIRAcetam  750 mg Oral BID     levofloxacin  500 mg Intravenous Q24H     levothyroxine  25 mcg Oral QAM AC     metoprolol tartrate  100 mg Oral BID     metoprolol tartrate  25 mg Oral Once     omeprazole  10 mg Oral Daily     polyethylene glycol  17 g Oral BID     QUEtiapine  100 mg Oral BID     sennosides  1-2 tablet Oral BID     sodium chloride (PF)  3 mL Intracatheter Q8H       Data   Recent Labs   Lab 10/24/19  0729 10/23/19  1111 10/22/19  1223 10/22/19  0840   WBC 18.2* 18.2*  --  20.3*   HGB 14.1 13.1  --  12.8   MCV 88 89  --  88    220  --  175    143  --  142   POTASSIUM 3.3* 3.6  --  3.3*   CHLORIDE 115* 119*  --  118*   CO2 20 18*  --  18*   BUN 21 23  --  30   CR 0.71 0.75  --  0.95   ANIONGAP 7 6  --  6   FLAVIA 9.2 9.1  --  8.6   * 123*  --  135*   ALBUMIN  --   --  2.0*  --    PROTTOTAL  --   --  6.1*  --    BILITOTAL  --   --  0.6  --    ALKPHOS  --   --  101  --    ALT  --   --  89*  --    AST  --   --  109*  --        No results found for this or any previous visit (from the past 24 hour(s)).

## 2019-10-24 NOTE — PROGRESS NOTES
10/24/19 0905   Quick Adds   Type of Visit Initial PT Evaluation   Living Environment   Lives With facility resident   Living Arrangements assisted living   Living Environment Comment Informaiton obtained from social work notes; patient refuses to answer questions   Self-Care   Current Activity Tolerance poor   Equipment Currently Used at Home walker, rolling   Functional Level Prior   Fall history within last six months yes   Number of times patient has fallen within last six months 1   Which of the above functional risks had a recent onset or change? ambulation;transferring;toileting;fall history;cognition   Prior Functional Level Comment Patient previously living in assistive living, ambulating using a FWW. Unable to gather more information from patient.    General Information   Onset of Illness/Injury or Date of Surgery - Date 10/20/19   Referring Physician Salvatore Reardon MD   Patient/Family Goals Statement To go home   Pertinent History of Current Problem (include personal factors and/or comorbidities that impact the POC) Sintia Connors is a 83 Y/o woman with PMH significant for dementia with paranoia, HTN. Seizure disorder and CVA who was admitted for severe sepsis ,Pneumonia with SARA  (per Jaki Scott PA-C note)   Precautions/Limitations fall precautions   Weight-Bearing Status - LUE full weight-bearing   Weight-Bearing Status - RUE full weight-bearing   Weight-Bearing Status - LLE full weight-bearing   Weight-Bearing Status - RLE full weight-bearing   Heart Disease Risk Factors Lack of physical activity;Medical history;High blood pressure;Age   General Observations Greeted pt supine in bed with sitter at bed side, on 4L O2   General Info Comments Activity: up with assist   Cognitive Status Examination   Orientation person;place   Level of Consciousness combative;alert;agitated;confused   Follows Commands and Answers Questions 50% of the time;able to follow single-step instructions  "  Personal Safety and Judgment impulsive;impaired   Memory impaired   Cognitive Comment Pt refusing most cares despite education, becomes combative and verbally abusing therapist and sitter   Integumentary/Edema   Integumentary/Edema Comments Observed open back wounds; educated pt on the importance of taking pressure off the back, patient refuses cares   Posture    Posture Forward head position;Protracted shoulders   Range of Motion (ROM)   ROM Comment Patient refuses formal assessment   Strength   Strength Comments Patient refuses formal assessment   Bed Mobility   Bed Mobility Comments max A x2 supine to sitting EOB   Transfer Skills   Transfer Comments Patient refuses sit <> stand despite max encouragement   Gait   Gait Comments NT   Balance   Balance Comments min A for seated balance at EOB   General Therapy Interventions   Planned Therapy Interventions bed mobility training;gait training;strengthening   Clinical Impression   Criteria for Skilled Therapeutic Intervention yes, treatment indicated   PT Diagnosis impaired mobility   Influenced by the following impairments weakness, fatigue, deconditioning   Functional limitations due to impairments impaired bed mobility, transfers and ambulation   Clinical Presentation Stable/Uncomplicated   Clinical Decision Making (Complexity) Low complexity   Therapy Frequency 3x/week   Predicted Duration of Therapy Intervention (days/wks) 4 days   Anticipated Discharge Disposition Long Term Care Facility   Risk & Benefits of therapy have been explained Yes   Patient, Family & other staff in agreement with plan of care Yes   [employer] TM \"6 Clicks\"   2016, [employer] of [employer], under license to [employer].  All rights reserved.   6 Clicks Short Forms Basic Mobility Inpatient Short Form   [employer] AM-PAC  \"6 Clicks\" V.2 Basic Mobility Inpatient Short Form   1. Turning from your back to your side while in a flat bed without using bedrails? 2 - A " Lot   2. Moving from lying on your back to sitting on the side of a flat bed without using bedrails? 2 - A Lot   3. Moving to and from a bed to a chair (including a wheelchair)? 1 - Total   4. Standing up from a chair using your arms (e.g., wheelchair, or bedside chair)? 1 - Total   5. To walk in hospital room? 1 - Total   Total Evaluation Time   Total Evaluation Time (Minutes) 10

## 2019-10-24 NOTE — PLAN OF CARE
A&Ox1 (self).  VSS, 4L NC.  Turn and repo Q2hrs as patient allows; she has not been out of bed, will require mechanical lift for transfers.  Incontinent BM this shift.  Purewick in place with good output.  Skin tears x 2 to R side of back. TELE: afib RVR.  L PIV SL with intermittent antibiotics.  Patient can be verbally aggressive and yells at staff.  1:1 sitter at the bedside for safety.  Discharge pending progress.

## 2019-10-24 NOTE — PROGRESS NOTES
SW Note:    D/I:  SW placed call to patient's son Pérez to discuss LTC recommendations from therapy for discharge plan.  Left VM.    Addendum:  SW received call back from patient's son Pérez who stated that prior to patient's hospitalization, they were planning to move patient to [facility] in [place].  SW discussed that I can be in touch with them to send referrals and confirm they are able to meet patient's needs upon discharge.      P:  SW will continue to assist with discharge/placement needs for patient.    John Henriquez, MSW, LGSW

## 2019-10-24 NOTE — PLAN OF CARE
[id number]: Pt alert to self. Up with lift, hasn't moved since arrived. Turn/repo q2h. Incontinent Lg BM x1, linen changed. On 4L O2 via NC satting in mid 90s. Purewick in place. Skin tears x2 on back. L PIV SL. Tele: A-fib CVR. Continue to monitor.

## 2019-10-25 NOTE — PLAN OF CARE
A&Ox1 (self).  VSS, 1L NC, tachy 120-140. TELE: afib RVR, sustained around 140, MD aware, Metop PO and IV given.Turn and repo Q2hrs as patient allows; she has not been out of bed, will require mechanical lift for transfers. ultrasound this evening, + Lauro DVTs, call into MD. NPO after MD for possible IVC filter. Skin tears x 2 to R side of back and R buttock. PIV x 2 SL.  Patient can be verbally aggressive and yells at staff.  1:1 sitter at the bedside for safety. CT scan performed on chest-came back positive with bilateral PE. K+ 3.3 repalced this evening, will need to be check at 0000 and AM. Discharge pending progress.

## 2019-10-25 NOTE — PLAN OF CARE
Patient is alert to self only, confused but pleasant. VSS except tachycardic (-120). Tele Afib RVR. Patient stating mid 90's on 3L O2 via NC prior to IR procedure this morning, now needing 5L after IR procedure and stating 90-93%. LS clear. C/o intermittent shortness of breath this morning. Abdomen distended. BS active. Tolerating regular diet. Taking medications whole with water Can be incontinent of stool, no BM this shift. Purewick in placed for urinary incontinent, urine dark ephraim output.  Turn and repositioned q2h PRN, patient at times independently repositions self. Superficial skin tears to R posterior upper back and buttock cheek. Bruise to tailbone area.  IVC filter placed in R groin this morning, area covered with gauze and Tegaderm dressing - CDI. CMS intact. K 4.0, on high replacement protocol, still needs to be replaced patient sleeping this afternoon. Discharge pending to LTC facility. Nursing to continue to monitor.

## 2019-10-25 NOTE — CONSULTS
"  [facility]    Stroke Consult Note    Reason for Consult:  Risk of AC given significant amyloid angiopathy      Chief Complaint: Fall and Agitation       HPI  Sintia Connors is a 84 year old female PMH significant for dementia with paranoia, HTN. Seizure disorder and CVA who was admitted for severe sepsis ,Pneumonia with SARA.  Patient found to be hypoxic and found to have subsegmental Pes.   Question for stroke team is AC risk given her significant cerebral amyloid angiopathy.    Impression  After reviewing her 2013 MRI brain which showed significant cerebral amyloid angiopathy would recommend to obtain LE dopplers and place IVC filter over AC as she would be at a significant risk of intracranial bleeding given CAA findings on MRI brain.  Patient received IVC filter today.    Stroke team will sign off, please let us know if you have any questions.    Elisa Muniz MD  Vascular Neurology Fellow  10/25/2019 4:30 PM    To page stroke neurology, click here: AMCOM  Choose \"On Call\" tab at top, then search dropdown box for \"Neurology Adult\" & press Enter, look for Neuro ICU/Stroke    _____________________________________________________    Past Medical History   Past Medical History:   Diagnosis Date     Atrial flutter (H) 4/12/15    post op ANW     Cerebral infarction (H)      GERD (gastroesophageal reflux disease)      PVC (premature ventricular contraction)      Rheumatoid arthritis (H)      S/P lumpectomy of breast     benign      Seizures (H)      Senile osteoporosis      Past Surgical History   Past Surgical History:   Procedure Laterality Date     APPENDECTOMY       Athroplasty right knee  4/2015     C TOTAL KNEE ARTHROPLASTY Left 09/2015     HERNIA REPAIR      hiatial     HERNIA REPAIR      inguial hernia repair     HYSTERECTOMY TOTAL ABDOMINAL, BILATERAL SALPINGO-OOPHORECTOMY, COMBINED       IR IVC FILTER PLACEMENT  10/25/2019     Medications   Home Meds  Prior to Admission medications    Medication " Sig Start Date End Date Taking? Authorizing Provider   acetaminophen (TYLENOL) 500 MG tablet Take 500 mg by mouth every 8 hours as needed for mild pain   Yes Reported, Patient   aspirin (ASA) 81 MG chewable tablet CHEW 1 TABLET BY MOUTH DAILY 4/22/19  Yes Brie Newberry APRN CNP   calcium carbonate (TUMS) 500 MG chewable tablet Take 1 chew tab by mouth every 8 hours as needed for heartburn    Yes Lazaro Gupta MD   diclofenac (VOLTAREN) 1 % GEL APPLY 4 GRAMS TO KNEES OR 2 GRAMS TO HANDS FOUR TIMES DAILY USING ENCLOSED DOSING CARD.  Patient taking differently: Apply 4 g topically every 6 hours as needed APPLY 4 GRAMS TO KNEES OR 2 GRAMS TO HANDS FOUR TIMES DAILY USING ENCLOSED DOSING CARD. 9/13/16  Yes Lazaro Gupta MD   doxycycline hyclate (VIBRAMYCIN) 100 MG capsule Take 100 mg by mouth 2 times daily X 7 days, started 10/17/2019   Yes Unknown, Entered By History   Glucos-Chond-Hyal Ac-Ca Fructo (MOVE FREE JOINT HEALTH ADVANCE) TABS Take 1 tablet by mouth daily 10/1/19  Yes Brie Newberry APRN CNP   glucosamine-chondroitin 500-400 MG CAPS per capsule Take 1 capsule by mouth daily 11/2/18  Yes Brie Newberry APRN CNP   levETIRAcetam (KEPPRA) 750 MG tablet TAKE 1 TABLET BY MOUTH TWO TIMES A DAY 9/25/19  Yes Brie Newberry APRN CNP   levothyroxine (SYNTHROID/LEVOTHROID) 25 MCG tablet TAKE 1 TABLET (25MCG) BY MOUTH ONCE DAILY 10/9/19  Yes Sharee Ewing APRN CNP   metoprolol tartrate (LOPRESSOR) 25 MG tablet Take 1.5 tablets (37.5 mg) by mouth 2 times daily 11/2/18  Yes Brie Newberry APRN CNP   omeprazole (PRILOSEC) 10 MG DR capsule TAKE ONE CAPSULE BY MOUTH DAILY 7/22/19  Yes Brie Newberry APRN CNP   QUEtiapine (SEROQUEL) 50 MG tablet Take 2 tablets (100 mg) by mouth 2 times daily 10/1/19  Yes Brie Newberry APRN CNP   Specialty Vitamins Products (WOMENS JOHN JEET) MISC Take 5 capsules by mouth daily   Yes Unknown,  Entered By History   vitamin B-Complex TAKE ONE TABLET BY MOUTH ONCE DAILY 10/2/19  Yes Brie Newberry APRN CNP   vitamin D3 (CHOLECALCIFEROL) 1000 units (25 mcg) tablet Take 1 tablet (1,000 Units) by mouth daily 10/1/19  Yes Brie Newberry APRN CNP   ORDER FOR DME Equipment being ordered: Shower Chair 3/31/15   Lazaro Gupta MD   ORDER FOR DME Equipment being ordered: Bath transfer bench  Fax to:[phone] 3/9/15   Lazaro Gupta MD   ORDER FOR DME Equipment being ordered: transfer bath bench 14   Lazaro Gupta MD       Scheduled Meds    aspirin  81 mg Oral Daily     digoxin  125 mcg Oral Daily     levETIRAcetam  750 mg Oral BID     [START ON 10/26/2019] levofloxacin  500 mg Intravenous Q24H     levothyroxine  25 mcg Oral QAM AC     metoprolol succinate ER  100 mg Oral BID     metoprolol tartrate  25 mg Oral Once     omeprazole  10 mg Oral Daily     polyethylene glycol  17 g Oral BID     QUEtiapine  100 mg Oral BID     sennosides  1-2 tablet Oral BID     sodium chloride (PF)  3 mL Intracatheter Q8H       Infusion Meds      PRN Meds  acetaminophen, glucose **OR** dextrose **OR** glucagon, lidocaine 4%, lidocaine (buffered or not buffered), melatonin, metoprolol, naloxone, ondansetron **OR** ondansetron, potassium chloride, potassium chloride with lidocaine, potassium chloride, potassium chloride, potassium chloride, prochlorperazine **OR** prochlorperazine **OR** prochlorperazine, sodium chloride (PF)    Allergies   Allergies   Allergen Reactions     Lorazepam Visual Disturbance     Severe hallucinations;     Family History   Family History   Problem Relation Age of Onset     Heart Disease No family hx of      Arrhythmia No family hx of      Social History   Social History     Tobacco Use     Smoking status: Former Smoker     Packs/day: 0.00     Last attempt to quit: 1983     Years since quittin.2     Smokeless tobacco: Never Used   Substance Use  Topics     Alcohol use: No     Alcohol/week: 0.0 standard drinks     Drug use: No       Review of Systems   Review of systems not obtained due to patient factors - patient's refusal       PHYSICAL EXAMINATION   Temp:  [96.1  F (35.6  C)-98.2  F (36.8  C)] 97.1  F (36.2  C)  Pulse:  [112-140] 123  Heart Rate:  [] 114  Resp:  [13-66] 16  BP: (121-148)/() 132/67  SpO2:  [92 %-97 %] 93 %      General:  patient lying in bed without any acute distress    HEENT:  normocephalic/atraumatic  Cardio:  RRR  Pulmonary:  no respiratory distress  Abdomen: soft non tender  Extremities: pulses intact  Skin:  no lesions     Neurologic  Alert,  follows commands, antigravity in all 4 extremities   No facial droop appreciated  Would not participate with any other part of exam      Dysphagia Screen  Per Nursing       Imaging  I personally reviewed all imaging; relevant findings per HPI.    Labs Data   CBC  Recent Labs   Lab 10/25/19  0652 10/24/19  0729 10/23/19  1111   WBC 20.3* 18.2* 18.2*   RBC 4.84 5.08 4.63   HGB 13.6 14.1 13.1   HCT 43.6 44.8 41.3    234 220     Basic Metabolic Panel   Recent Labs   Lab 10/25/19  0652 10/25/19  0028 10/24/19  0729 10/23/19  1111   *  --  142 143   POTASSIUM 4.0 3.9 3.3* 3.6   CHLORIDE 119*  --  115* 119*   CO2 21  --  20 18*   BUN 22  --  21 23   CR 0.67  --  0.71 0.75   *  --  117* 123*   FLAVIA 9.0  --  9.2 9.1     Liver Panel  Recent Labs   Lab Test 10/22/19  1223 07/29/18  1200 03/15/16  1538   PROTTOTAL 6.1* 7.3 7.3   ALBUMIN 2.0* 3.8 4.0   BILITOTAL 0.6 0.5 0.6   ALKPHOS 101 65 92   * 22 22   ALT 89* 26 27     INR  Recent Labs   Lab Test 05/08/17 04/10/17 03/27/17   INR 3.0 2.5 2.5      Lipid Profile  Recent Labs   Lab Test 11/10/17  1500 12/24/14  1156 12/26/13  0656   CHOL 196 158 158   HDL 44* 56 48*   * 87 98   TRIG 135 77 62   CHOLHDLRATIO  --  2.8 3.3     A1C  Recent Labs   Lab Test 12/26/13  0656   A1C 5.1     Troponin Donnie results for  input(s): TROPI in the last 168 hours.

## 2019-10-25 NOTE — PROGRESS NOTES
[facility]    Hospitalist Progress Note      Assessment & Plan   Sintia Connors is a 84 year old female with PMH significant for dementia with paranoia, HTN. Seizure disorder and CVA who was admitted for severe sepsis ,Pneumonia with SARA. She is noted to have DVT/PE and also issues with difficulty to control A.fib with RVR    ADDENDUM 1:50pm    Spoke with patient's son (Pérez.) Explained patient's current hospital course to him. We discussed code status. Pérez is patient's health care agent, does not want to make the decision without talking to his brother. He will be discussing with his brother today, will inform the nursing staff with any change in code status.       Bilateral subsegmental PEs  Bilateral DVT  CT chest obtained on 10/24 due to ongoing tachycardia, O2 requirement and persistently elevated WBC despite treatment of presumed pneumonia. This revealed bilateral pulmonary emboli predominantly in the subsegmental branches.  Neurology ([facility]) consulted regarding initiation of anticoagulation in the setting of amyloid angiopathy and prior hx of intracranial bleed in the setting of anticoagulation. Neuro reviewed prior MRIs and did not recommend anticoagulation due extent of amyloid angiopathy on imaging and increased risk of intracranial bleed.  LE US on 10/24 also showed DVT in bilateral CFV, femoral veins, left popliteal, bilateral posterior tibial veins.  Due to extensive dvt and risk of fatal thromboembolic event and inability to anticoagulate, IVC filter was discussed with Niece  (Cassandra) which she is in agreement. TTE (10/21 EF 60-65%, RV mod to severely dilated  mildly decreased RV systolic function (prior echo 2015, poorly visualized RV but possible mild dilation)  - IVC filter placement today, discussed with Dr. Doyle from IR  - NO ANTICOAGULATION due to cerebral angiopathy and risk of intracranial hemorrhage  - continue to monitor and wean oxygen as able  - appreciate neurology input  regarding anticoagulation    Presumed LLL community acquired pneumonia  She was recently seen in the [place] ER, started on oral doxycycline for left lower lobe pneumonia. Per review [facility] notes patient has issues with medication complience. CXR on admission with LLL infiltrate. WBC 31.6. LA 2.3. She was started on Levaquin on admission which is continued. Suspect SIRS is likely due to extensive DVT and PE rather than sepsis from pneumonia. F/u XRAy 2 days after admission without clear airspace disease.  CT of the chest on 10/24 does show atelectasis, but no clear pneumonia.   - will have patient complete total of 7 days of Levaquin and stop. Day 5/7 today    SIRS:   Patient presented with marked leukocytosis (31), tachycardia (Afib/RVR), mildly increased lactic acid.  I suspect this is secondary to extensive DVT/PE, rather than sepsis or pneumonia. She still has quite elevated WBC at 20 despite treatment of pneumonia. She is afebrile.   - will monitor WBC  - blood culture is no growth to date  - as above unable to anticoagulation for PE       Acute Hypoxic Respiratory Failure:   Likely secondary to PE. Wean oxygen as able. Currently using 2-3L NC  - wean as able    Acute HFpEF  She was up 3-4L in the setting of volume resuscitation from Rhabdomyolysis. Cr normalized. CK < 1000. CXR with mild edema on 10/23.  IVF stopped and received Lasix 20mg IV on 10/23 with decent urine output.    - consider additional Lasix if unable to wean off O2 further  - Wean O2 as able    Atrial fibrillation with RVR:   Has a history of atrial fibrillation/flutter in the past.Went into atrial fibrillation with RVR while in the ER. PTA regimen includes Metoprolol 37.5. Not on anticoagulation due to history of hemorraghic CVA and questionable amyloid angiopathy. Suspect this is driven by PE. HR has been difficult to control and currently ranging 100-120s today.   - change Lopressor to Metoprolol XL 100mg BID  - started digoxin 125mcg  daily  - will add Digoxin once Potassium is replaced  - Appreciate Cardiology input, they have since signed off, 10/23  - Continue PTA ASA  - If HR continues to be uncontrolled consider reconsulting cards    Hypokalemia:  - replaced    Abdominal Distention/Pain: improved  Constipation:   Abdominal distention noted. Difficult history due to dementia. WBC remains high. LA normalized. Abd Xray 10/22 with normal gas pattern. No significant tenderness on exam  - monitor     Acute kidney injury, resolved: Baseline Cr 0.6-0.8  - Cr 2.89 on admission.  Likely pre-renal secondary to sepsis and Rhabdo.  - IVF stopped 10/22  - cr stable at baseline, will monitor after IV contrast adminstration      Rhabdomyolysis.  CK level 3693  U /litre. Was found on the floor in her kitchen, unclear exactly how long she was on the floor UA with large blood but only 5 RBCs.  - Improved with IVF. .Cr at baseline  - IVF d/c 10/23     Vascular dementia with behavioral disturbances  Paranoia  Depression with psychotic features  Amyloid angiopathy  - Continue PTA Seroquel. Appears to be at baseline per review retirement records  - patient not cooperative with cares or turns, refusing to get out of bed.    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     I was able to get hold of Cassandra (niece) second health care agent. Dicussed current medical issues with Cassandra. Discussed about PE/extensive DVT. neurologies recs against anticoagulation due to risk of ICH.  Also explained to her extensive DVT increases patient's risk of fatal VTE event.  Agrees with IVC filter placement. I also brought up patient's code status as full. Recommend family to discuss about  this and consider DNR/DNI since she will have poor outcome if she needed to be resuscitated in any event.  Cassandra would discuss with Pérez.          Communications: discussed with nursing staff.     DVT Prophylaxis: Pneumatic Compression Devices  Code Status: Full Code    Disposition: Expected discharge 2-3 days once HR control improves, remains stable from resp status point    Efraín Correa MD  Text Page  (7am to 6pm)    Interval History   Patient would like some ice cream for breakfast.   Complains of some pain in her right thigh.  Denies abdominal pain, nausea or vomiting. She is afebrile.  HR ranges from 100-120s on tele.   Plan for IVC filter placement    -Data reviewed today: I reviewed all new labs and imaging results over the last 24 hours. I personally reviewed the LE doppler image(s) showing dvt as above.    Physical Exam   Temp: 96.1  F (35.6  C) Temp src: Oral BP: (!) 143/67 Pulse: 128 Heart Rate: 74(Radial pulse ) Resp: 16 SpO2: 94 % O2 Device: Nasal cannula Oxygen Delivery: 3 LPM  Vitals:    10/21/19 0614 10/22/19 0600   Weight: 106.9 kg (235 lb 11.2 oz) 108.2 kg (238 lb 8 oz)     Vital Signs with Ranges  Temp:  [96.1  F (35.6  C)-98.2  F (36.8  C)] 96.1  F (35.6  C)  Pulse:  [128-140] 128  Heart Rate:  [] 74  Resp:  [16] 16  BP: (127-148)/(62-79) 143/67  SpO2:  [92 %-94 %] 94 %  I/O last 3 completed shifts:  In: 550 [I.V.:550]  Out: 1100 [Urine:1100]    Constitutional: Alert, awake,   Respiratory: Diminished BS at the bases  Cardiovascular: irregular, tachy, no significant LE edema  GI: soft, distended, no significant tenderness on exam  Skin/Integumen: warm and dry  Other:      Medications       aspirin  81 mg Oral Daily     digoxin  125 mcg Oral Daily     levETIRAcetam  750 mg Oral BID     levofloxacin  500 mg Intravenous Q24H     levothyroxine  25 mcg Oral QAM AC     metoprolol succinate ER  100 mg Oral BID     metoprolol tartrate  25 mg Oral Once     omeprazole  10 mg Oral Daily      polyethylene glycol  17 g Oral BID     QUEtiapine  100 mg Oral BID     sennosides  1-2 tablet Oral BID     sodium chloride (PF)  3 mL Intracatheter Q8H       Data   Recent Labs   Lab 10/25/19  0652 10/25/19  0028 10/24/19  0729 10/23/19  1111 10/22/19  1223   WBC 20.3*  --  18.2* 18.2*  --    HGB 13.6  --  14.1 13.1  --    MCV 90  --  88 89  --      --  234 220  --    *  --  142 143  --    POTASSIUM 4.0 3.9 3.3* 3.6  --    CHLORIDE 119*  --  115* 119*  --    CO2 21  --  20 18*  --    BUN 22  --  21 23  --    CR 0.67  --  0.71 0.75  --    ANIONGAP 6  --  7 6  --    FLAVIA 9.0  --  9.2 9.1  --    *  --  117* 123*  --    ALBUMIN  --   --   --   --  2.0*   PROTTOTAL  --   --   --   --  6.1*   BILITOTAL  --   --   --   --  0.6   ALKPHOS  --   --   --   --  101   ALT  --   --   --   --  89*   AST  --   --   --   --  109*       Recent Results (from the past 24 hour(s))   CT Chest Pulmonary Embolism w Contrast    Narrative    CT CHEST PULMONARY EMBOLISM WITH CONTRAST 10/24/2019 12:09 PM    HISTORY: 84-year-old woman with hypoxia and shortness of breath.  Patient has rapid atrial fibrillation, as well.    COMPARISON: None    TECHNIQUE: Axial and coronal CT images obtained from the lung apices  through the lung bases after the uneventful administration of Isovue  370 intravenous contrast given for a total of 79 mL. Radiation dose  for this scan was reduced using automated exposure control, adjustment  of the mA and/or kV according to patient size, or iterative  reconstruction technique.    FINDINGS: No obvious abnormally enlarged mediastinal lymph nodes.  Heart size is not enlarged. Visible solid organs in the upper abdomen  are unremarkable. Moderate hiatal hernia. No acute osseous  abnormality. Small bilateral pleural effusions. Scattered bibasilar  opacities, in some areas consolidation corresponding to atelectasis,  though underlying pneumonia could not be excluded radiographically.  Poor opacification of  the thoracic aorta. Bilateral subsegmental  pulmonary emboli are identified, in left upper lobe pulmonary arteries  more so than lower lobe pulmonary arteries. Similarly, right upper  lobe pulmonary arteries and lower lobe arteries. Overall thrombus  burden is not great, though reflux of contrast into the hepatic veins  as well as an RV/LV ratio of approximately 1.3 suggest variable degree  of right heart strain.      Impression    IMPRESSION:  1. Bilateral pulmonary emboli, predominantly in subsegmental branches.  Given CT suggestion of right heart strain with RV/LV ratio of 1.3 and  reflux in the hepatic veins, would recommend echocardiogram. Also  recommend anticoagulation if clinically appropriate.  2. Small bilateral pleural effusions.  3. Moderate hiatal hernia.    Please note the above findings of bilateral pulmonary emboli were  discussed by myself to patient's nurse, Ave at 12:30 PM on  October 24, 2019.    RM VIEIRA MD   US Lower Extremity Venous Duplex Bilateral    Narrative    ULTRASOUND VENOUS LOWER EXTREMITY BILATERAL 10/24/2019 5:08 PM     HISTORY: PE, rule out deep vein thrombosis.      COMPARISON: None.    TECHNIQUE: Ultrasound gray scale, Color Doppler flow, and spectral  Doppler waveform analysis performed.      Impression    IMPRESSION: Deep vein thrombus in the bilateral common femoral and  femoral veins. Deep vein thrombus in the left popliteal vein. Deep  vein thrombus in the bilateral posterior tibial veins. Deep vein  thrombus in the left peroneal vein.    BENNIE NAYAK MD

## 2019-10-25 NOTE — PRE-PROCEDURE
GENERAL PRE-PROCEDURE:   Procedure:  Inferior vena cava filter placement with moderate sedation  Date/Time:  10/25/2019 10:13 AM    Verbal consent obtained?: Yes    Risks and benefits: Risks, benefits and alternatives were discussed    Consent given by:  Power of   Patient states understanding of procedure being performed: Yes    Patient's understanding of procedure matches consent: Yes    Procedure consent matches procedure scheduled: Yes    Expected level of sedation:  Moderate  Appropriately NPO:  Yes  ASA Class:  Class 3- Severe systemic disease, definite functional limitations  Mallampati  :  Grade 2- soft palate, base of uvula, tonsillar pillars, and portion of posterior pharyngeal wall visible  Lungs:  Lungs clear with good breath sounds bilaterally  Heart:  Normal heart sounds and rate  History & Physical reviewed:  History and physical reviewed and no updates needed  Statement of review:  I have reviewed the lab findings, diagnostic data, medications, and the plan for sedation    Phone consent obtained from patient's niece, Cassandra.     Sedation assessment performed under the supervision of Dr. Campa.

## 2019-10-25 NOTE — PLAN OF CARE
A&Ox1 (self).  VSS, except tachy 100-120 bpm, 3L NC saturations > 90%,  maintained.  Patient currently has a 1:1 sitter at the bedside for safety.  PIV SL; intermittent antibiotics given (she had 2 PIVs, but pulled one out).  Potassium was replaced on previous shift, recheck - 3.9, replaced and recheck ordered for the AM.  NPO after midnight for placement of IVC filter today.  Purewick in-place.  Q2 turns this shift, patient does not get out of bed.  Discharge pending progress.

## 2019-10-25 NOTE — CONSULTS
SW Consult Note:    Care Transition Initial Assessment - SW     Met with: Family (Son, Pérez)   Active Problems:    Sepsis (H)       DATA  Lives With: facility resident   Living Arrangements: assisted living  Quality of Family Relationships: helpful, involved, supportive  Description of Support System: Supportive, Involved  Who is your support system?: Children, Facility resident(s)/Staff  Support Assessment: Adequate family and caregiver support, Adequate social supports.   Identified issues/concerns regarding health management: Patient is an 84 year old female that was admitted to the hospital with a primary diagnosis of sepsis. Reviewed chart and spoke with patient's son Pérez via phone to discuss plans for discharge for patient.  Per son, patient was living at [facility] and due to increase in care needs, patient was asked to move out by October 31.  Patient's son had [facility] [place] and was planning to move patient there at the end of the month.  SW discussed with son that per therapies, patient is needing a max assist of two and discussed that LTC facility was being recommended.  SW will follow up with deonna to see if they are able to accommodate patient's needs.  If they are, son would like to have patient just discharge to [facility].     Addendum:  SW placed call to [facility] [phone]) to follow up regarding level of care they are able to provide for patient.  Left VM for Scarlett with /Admissions.            Quality of Family Relationships: helpful, involved, supportive       ASSESSMENT  Cognitive Status:  alert and oriented to self  Concerns to be addressed: Discharge planning.      PLAN  Financial costs for the patient includes: TBD  Patient given options and choices for discharge: Yes  Patient/family is agreeable to the plan?  Yes  Transportation/person available to transport on day of discharge: TBD  Patient Goals and Preferences: TBD  Patient  anticipates discharging to:  [facility], VELASQUEZ, LGSW

## 2019-10-25 NOTE — IR NOTE
Interventional Radiology Intra-procedural Nursing Note    Patient Name: Sintia Connors  Medical Record Number: [id number]  Today's Date: October 25, 2019    Start Time: 1106  End of procedure time: 1116  Procedure: Inferior Vena Cava Filter Placement  Report given to: Danielle CAMPOS, station 88  Time pt departs:  1125    Other Notes: Patient into IR suite 2 via cart with O2 on. Patient awake, restless and anxious. Disoriented to situation, consent signed over phone with family. Patient to table in supine position, prepped and draped in sterile fashion. VSS, atrial fibrillation in 120's. Dr. Campa in room, timeout and procedure started. Venous sheath to RFV. Filter placed. Patient received 100 mcg fentanyl. Debrief with Dr. Campa, no complications. Dressing clean, dry and intact to right groin. Report called to station 88.     Lisha Manuel RN

## 2019-10-25 NOTE — PROVIDER NOTIFICATION
MD Notification    Notified Person: MD Vergara    Notified Person Name:    Notification Date/Time: 10/24/2019 10:31 PM      Notification Interaction:    Purpose of Notification: Pt with sustained HR in 130-140's.    Orders Received: give scheduled metop, give IV one time 2.5mg, ok to give lanoxin    Comments:

## 2019-10-25 NOTE — PROVIDER NOTIFICATION
MD Notification    Notified Person: MD stevens     Notified Person Name:    Notification Date/Time: 10/24/2019 10:45 PM     Notification Interaction:    Purpose of Notification: US LE + clots    Orders Received:    Comments:

## 2019-10-25 NOTE — CONSULTS
"  [facility]    Stroke Consult Note    Reason for Consult:  Risk of AC given significant amyloid angiopathy      Chief Complaint: Fall and Agitation       HPI  Sintia Connors is a 84 year old female PMH significant for dementia with paranoia, HTN. Seizure disorder and CVA who was admitted for severe sepsis ,Pneumonia with SARA.  Patient found to be hypoxic and found to have subsegmental Pes.   Question for stroke team is AC risk given her significant cerebral amyloid angiopathy.    Impression  After reviewing her 2013 MRI brain which showed significant cerebral amyloid angiopathy would recommend to obtain LE dopplers and place IVC filter over AC as she would be at a significant risk of intracranial bleeding given CAA findings on MRI brain.  Patient received IVC filter today.    Stroke team will sign off, please let us know if you have any questions.    Elisa Muniz MD  Vascular Neurology Fellow  10/25/2019 4:34 PM    To page stroke neurology, click here: [facility]  Choose \"On Call\" tab at top, then search dropdown box for \"Neurology Adult\" & press Enter, look for Neuro ICU/Stroke    _____________________________________________________    Past Medical History   Past Medical History:   Diagnosis Date     Atrial flutter (H) 4/12/15    post op ANW     Cerebral infarction (H)      GERD (gastroesophageal reflux disease)      PVC (premature ventricular contraction)      Rheumatoid arthritis (H)      S/P lumpectomy of breast     benign      Seizures (H)      Senile osteoporosis      Past Surgical History   Past Surgical History:   Procedure Laterality Date     APPENDECTOMY       Athroplasty right knee  4/2015     C TOTAL KNEE ARTHROPLASTY Left 09/2015     HERNIA REPAIR      hiatial     HERNIA REPAIR      inguial hernia repair     HYSTERECTOMY TOTAL ABDOMINAL, BILATERAL SALPINGO-OOPHORECTOMY, COMBINED       IR IVC FILTER PLACEMENT  10/25/2019     Medications   Home Meds  Prior to Admission medications    Medication " Sig Start Date End Date Taking? Authorizing Provider   acetaminophen (TYLENOL) 500 MG tablet Take 500 mg by mouth every 8 hours as needed for mild pain   Yes Reported, Patient   aspirin (ASA) 81 MG chewable tablet CHEW 1 TABLET BY MOUTH DAILY 4/22/19  Yes Brie Newberry APRN CNP   calcium carbonate (TUMS) 500 MG chewable tablet Take 1 chew tab by mouth every 8 hours as needed for heartburn    Yes Lazaro Gupta MD   diclofenac (VOLTAREN) 1 % GEL APPLY 4 GRAMS TO KNEES OR 2 GRAMS TO HANDS FOUR TIMES DAILY USING ENCLOSED DOSING CARD.  Patient taking differently: Apply 4 g topically every 6 hours as needed APPLY 4 GRAMS TO KNEES OR 2 GRAMS TO HANDS FOUR TIMES DAILY USING ENCLOSED DOSING CARD. 9/13/16  Yes Lazaro Gupta MD   doxycycline hyclate (VIBRAMYCIN) 100 MG capsule Take 100 mg by mouth 2 times daily X 7 days, started 10/17/2019   Yes Unknown, Entered By History   Glucos-Chond-Hyal Ac-Ca Fructo (MOVE FREE JOINT HEALTH ADVANCE) TABS Take 1 tablet by mouth daily 10/1/19  Yes Brie Newberry APRN CNP   glucosamine-chondroitin 500-400 MG CAPS per capsule Take 1 capsule by mouth daily 11/2/18  Yes Brie Newberry APRN CNP   levETIRAcetam (KEPPRA) 750 MG tablet TAKE 1 TABLET BY MOUTH TWO TIMES A DAY 9/25/19  Yes Brie Newberry APRN CNP   levothyroxine (SYNTHROID/LEVOTHROID) 25 MCG tablet TAKE 1 TABLET (25MCG) BY MOUTH ONCE DAILY 10/9/19  Yes Sharee Ewing APRN CNP   metoprolol tartrate (LOPRESSOR) 25 MG tablet Take 1.5 tablets (37.5 mg) by mouth 2 times daily 11/2/18  Yes Brie Newberry APRN CNP   omeprazole (PRILOSEC) 10 MG DR capsule TAKE ONE CAPSULE BY MOUTH DAILY 7/22/19  Yes Brie Newberry APRN CNP   QUEtiapine (SEROQUEL) 50 MG tablet Take 2 tablets (100 mg) by mouth 2 times daily 10/1/19  Yes Brie Newberry APRN CNP   Specialty Vitamins Products (WOMENS JOHN JEET) MISC Take 5 capsules by mouth daily   Yes Unknown,  Entered By History   vitamin B-Complex TAKE ONE TABLET BY MOUTH ONCE DAILY 10/2/19  Yes Brie Newberry APRN CNP   vitamin D3 (CHOLECALCIFEROL) 1000 units (25 mcg) tablet Take 1 tablet (1,000 Units) by mouth daily 10/1/19  Yes Brie Newberry APRN CNP   ORDER FOR DME Equipment being ordered: Shower Chair 3/31/15   Lazaro Gupta MD   ORDER FOR DME Equipment being ordered: Bath transfer bench  Fax to:[phone] 3/9/15   Lazaro Gupta MD   ORDER FOR DME Equipment being ordered: transfer bath bench 14   Lazaro Gupta MD       Scheduled Meds    aspirin  81 mg Oral Daily     digoxin  125 mcg Oral Daily     levETIRAcetam  750 mg Oral BID     [START ON 10/26/2019] levofloxacin  500 mg Intravenous Q24H     levothyroxine  25 mcg Oral QAM AC     metoprolol succinate ER  100 mg Oral BID     metoprolol tartrate  25 mg Oral Once     omeprazole  10 mg Oral Daily     polyethylene glycol  17 g Oral BID     QUEtiapine  100 mg Oral BID     sennosides  1-2 tablet Oral BID     sodium chloride (PF)  3 mL Intracatheter Q8H       Infusion Meds      PRN Meds  acetaminophen, glucose **OR** dextrose **OR** glucagon, lidocaine 4%, lidocaine (buffered or not buffered), melatonin, metoprolol, naloxone, ondansetron **OR** ondansetron, potassium chloride, potassium chloride with lidocaine, potassium chloride, potassium chloride, potassium chloride, prochlorperazine **OR** prochlorperazine **OR** prochlorperazine, sodium chloride (PF)    Allergies   Allergies   Allergen Reactions     Lorazepam Visual Disturbance     Severe hallucinations;     Family History   Family History   Problem Relation Age of Onset     Heart Disease No family hx of      Arrhythmia No family hx of      Social History   Social History     Tobacco Use     Smoking status: Former Smoker     Packs/day: 0.00     Last attempt to quit: 1983     Years since quittin.2     Smokeless tobacco: Never Used   Substance Use  Topics     Alcohol use: No     Alcohol/week: 0.0 standard drinks     Drug use: No       Review of Systems   Review of systems not obtained due to patient factors - patient's refusal       PHYSICAL EXAMINATION   Temp:  [96.1  F (35.6  C)-98.2  F (36.8  C)] 97.1  F (36.2  C)  Pulse:  [112-140] 123  Heart Rate:  [] 114  Resp:  [13-66] 16  BP: (121-148)/() 132/67  SpO2:  [92 %-97 %] 93 %      General:  patient lying in bed without any acute distress    HEENT:  normocephalic/atraumatic  Cardio:  RRR  Pulmonary:  no respiratory distress  Abdomen: soft non tender  Extremities: pulses intact  Skin:  no lesions     Neurologic  Alert,  follows commands, antigravity in all 4 extremities   No facial droop appreciated  Would not participate with any other part of exam      Dysphagia Screen  Per Nursing       Imaging  I personally reviewed all imaging; relevant findings per HPI.    Labs Data   CBC  Recent Labs   Lab 10/25/19  0652 10/24/19  0729 10/23/19  1111   WBC 20.3* 18.2* 18.2*   RBC 4.84 5.08 4.63   HGB 13.6 14.1 13.1   HCT 43.6 44.8 41.3    234 220     Basic Metabolic Panel   Recent Labs   Lab 10/25/19  0652 10/25/19  0028 10/24/19  0729 10/23/19  1111   *  --  142 143   POTASSIUM 4.0 3.9 3.3* 3.6   CHLORIDE 119*  --  115* 119*   CO2 21  --  20 18*   BUN 22  --  21 23   CR 0.67  --  0.71 0.75   *  --  117* 123*   FLAVIA 9.0  --  9.2 9.1     Liver Panel  Recent Labs   Lab Test 10/22/19  1223 07/29/18  1200 03/15/16  1538   PROTTOTAL 6.1* 7.3 7.3   ALBUMIN 2.0* 3.8 4.0   BILITOTAL 0.6 0.5 0.6   ALKPHOS 101 65 92   * 22 22   ALT 89* 26 27     INR  Recent Labs   Lab Test 05/08/17 04/10/17 03/27/17   INR 3.0 2.5 2.5      Lipid Profile  Recent Labs   Lab Test 11/10/17  1500 12/24/14  1156 12/26/13  0656   CHOL 196 158 158   HDL 44* 56 48*   * 87 98   TRIG 135 77 62   CHOLHDLRATIO  --  2.8 3.3     A1C  Recent Labs   Lab Test 12/26/13  0656   A1C 5.1     Troponin Donnie results for  input(s): TROPI in the last 168 hours.

## 2019-10-25 NOTE — PROGRESS NOTES
Paged for US results confirming extensive bilateral DVT. Chart reviewed. I have discussed the case with IR and plan for IVC filter placement early in AM; I have tried to contact both her niece and son whose numbers are listed in Epic but reached voicemail. It seems her niece who lives locally was involved in her care today; her son who lives out of town is listed as the first contact. In any event one of these individuals will need to be reached by the Hospitalist team in AM to discuss possible filter placement (noting the patient's chronic dementia).

## 2019-10-26 NOTE — PROGRESS NOTES
[facility]    Medicine Progress Note - Hospitalist Service       Date of Admission:  10/20/2019  Assessment & Plan   Sintia Connors is a 84 year old female with PMH significant for dementia with paranoia, HTN. Seizure disorder and CVA who was admitted for severe sepsis, pneumonia with SARA. She was subsequently noted to have DVT/PE and also issues with difficulty to control A.fib with RVR    Bilateral subsegmental PEs  Bilateral DVT  CT chest obtained on 10/24 due to ongoing tachycardia, O2 requirement and persistently elevated WBC despite treatment of presumed pneumonia. This revealed bilateral pulmonary emboli predominantly in the subsegmental branches.  Neurology ([facility]) consulted regarding initiation of anticoagulation in the setting of amyloid angiopathy and prior hx of intracranial bleed in the setting of anticoagulation. Neuro reviewed prior MRIs and did not recommend anticoagulation due extent of amyloid angiopathy on imaging and increased risk of intracranial bleed.  LE US on 10/24 also showed DVT in bilateral CFV, femoral veins, left popliteal, bilateral posterior tibial veins.  Due to extensive dvt and risk of fatal thromboembolic event and inability to anticoagulate, IVC filter was discussed with Niece (Cassandra) which she was in agreement. TTE (10/21 EF 60-65%, RV mod to severely dilated mildly decreased RV systolic function (prior echo 2015, poorly visualized RV but possible mild dilation)  - IVC filter placed by IR on 10/25/19.  - NO ANTICOAGULATION due to cerebral angiopathy and risk of intracranial hemorrhage, see neurology notes/recommendations, appreciate their assistance.  - Continue to monitor and wean oxygen as able.     Presumed LLL community acquired pneumonia  She was recently seen in the [place] ER, started on oral doxycycline for left lower lobe pneumonia. Per review [facility] notes patient has issues with medication complience. CXR on admission with LLL infiltrate. WBC 31.6. LA  2.3. She was started on Levaquin on admission which is continued. Suspect SIRS is likely due to extensive DVT and PE rather than sepsis from pneumonia. F/u XRAy 2 days after admission without clear airspace disease.  CT of the chest on 10/24 does show atelectasis, but no clear pneumonia.   - Will have patient complete total of 7 days of Levaquin and stop. Day 6/7 today.     Abdominal Distention/Pain  Constipation  Abdominal distention noted previously. Abdominal x-ray on 10/22/19 showed normal gas pattern.  - Difficult to obtain history due to mental status.  - WBC trending up, now 36.2.  - Abdomen remains distended, now tender as well.  - Will get CT abdomen/pelvis today for further evaluation.   - CT showed possible bowel obstruction and abscess.  - Consult general surgery regarding bowel obstruction.  - Discussed with IR regarding potential abscess, will review CT scan and get back to me.  - Will broaden antibiotics with IV vancomycin and zosyn.    SIRS  Patient presented with marked leukocytosis (31), tachycardia (Afib/RVR), mildly increased lactic acid. I suspect this is secondary to extensive DVT/PE, rather than sepsis or pneumonia. She still has quite elevated WBC at 20 despite treatment of pneumonia. She is afebrile.   - WBC trending up.  - Blood cultures negative to date.  - Treat underlying issues as noted.     Acute Hypoxic Respiratory Failure   Likely secondary to PE. Currently using 5-6 lpm by NC.  - Wean supplemental oxygen as able.     Acute HFpEF  She was up 3-4L in the setting of volume resuscitation from Rhabdomyolysis. Cr normalized. CK < 1000. CXR with mild edema on 10/23.  IVF stopped and received Lasix 20mg IV on 10/23 with decent urine output.    - O2 requirements going up.  - Chest x-ray today shows no acute cardiopulmonary disease.  - Wean O2 as able.     Atrial fibrillation with RVR:   Has a history of atrial fibrillation/flutter in the past. Went into atrial fibrillation with RVR while in  the ER. PTA regimen includes Metoprolol 37.5. Not on anticoagulation due to history of hemorraghic CVA and questionable amyloid angiopathy. Suspect this is driven by PE. HR has been difficult to control and currently ranging 100-120s today.   - Switch oral metoprolol to IV metoprolol.  - Continue digoxin 125mcg daily.  - Appreciate Cardiology input, they have since signed off, 10/23.  - Continue PTA ASA.     Hypokalemia  - Resolved with replacement.    Hyponatremia  Hyperchloremia  - Start IV D5, recheck labs in AM.    Acute kidney injury, resolved: Baseline Cr 0.6-0.8  - Creatinine 2.89 on admission.  Likely pre-renal secondary to sepsis and Rhabdo.  - IVF stopped 10/22.  - Creatinine stable now and at baseline.      Rhabdomyolysis.  CK level 3693  U /litre. Was found on the floor in her kitchen, unclear exactly how long she was on the floor UA with large blood but only 5 RBCs.  - Improved with IVF. .Cr at baseline  - IVF d/c 10/23     Vascular dementia with behavioral disturbances  Paranoia  Depression with psychotic features  Amyloid angiopathy  - Continue PTA Seroquel.     Seizures  - Continue PTA Keppra, will switch to IV for now.     History of Hemorraghic CVA 2017  - Occurred in setting of supra-therapeutic INR.     Hypothyroidism  - Continue PTA levothyroxine.     GERD  - Continue PTA omeprazole.     Care plan  - Discussed with her son, Pérez by phone this afternoon. Updated him on CT scan results with plan for surgery and IR consults. Discussed concern about overall prognosis. He is planning to talk to his brother then likely transition to DNR/DNI status. Was open to consults today, however had questions about if she would be a candidate for a procedure. Discussed that she would be high risk for a procedure with her acute pulmonary emboli (cannot tolerate anticoagulation due to cerebral amyloid angiopathy with history of intracranial bleed) and her other acute medical issues. Will await input from  consulting physicians.       Diet: Room Service  NPO for Medical/Clinical Reasons Except for: Meds    DVT Prophylaxis: Pneumatic Compression Devices  Sheets Catheter: not present  Code Status: Full Code      Disposition Plan   Expected discharge: 3-4 days once symptoms improved, hypoxia improved, HR improved.  Entered: Salvatore Reardon MD 10/26/2019, 11:29 AM       The patient's care was discussed with the Bedside Nurse and Patient.    Salvatore Reardon MD  Hospitalist Service  [facility]    ______________________________________________________________________    Interval History   Sintia Connors was seen this morning. Doesn't feel good. Feels short of breath. Denies fevers, chest pain. Some abdominal discomfort, no nausea. No family present at the moment.    Data reviewed today: I reviewed all medications, new labs and imaging results over the last 24 hours. I personally reviewed the chest x-ray image(s) showing no acute cardiopulmonary disease.    Physical Exam   Vital Signs: Temp: 97.4  F (36.3  C) Temp src: Axillary BP: 132/66   Heart Rate: 109 Resp: 23 SpO2: 92 % O2 Device: Nasal cannula Oxygen Delivery: 6 LPM(pt pulling out NC frequently)  Weight: 245 lbs 3.2 oz  Constitutional: awake, more drowsy today, cooperative, appears uncomfortable  Respiratory: diminished at the bases  Cardiovascular: irregularly irregular rhythm, normal S1 and S2, no murmur noted and no edema  GI: hypoactive bowel sounds, soft, distended and tenderness noted diffusely  Skin: warm, dry  Neurologic: awake, drowsy, answered a few questions but then drifted off to sleep    Data   Recent Labs   Lab 10/26/19  1411 10/26/19  0926 10/25/19  0652  10/24/19  0729  10/22/19  1223   WBC 36.2*  --  20.3*  --  18.2*   < >  --    HGB 14.6  --  13.6  --  14.1   < >  --    MCV 91  --  90  --  88   < >  --      --  221  --  234   < >  --    *  --  146*  --  142   < >  --    POTASSIUM 4.3 4.2 4.0   < > 3.3*   < >  --     CHLORIDE 120*  --  119*  --  115*   < >  --    CO2 22  --  21  --  20   < >  --    BUN 25  --  22  --  21   < >  --    CR 0.75  --  0.67  --  0.71   < >  --    ANIONGAP 6  --  6  --  7   < >  --    FLAVIA 9.5  --  9.0  --  9.2   < >  --    *  --  110*  --  117*   < >  --    ALBUMIN  --   --   --   --   --   --  2.0*   PROTTOTAL  --   --   --   --   --   --  6.1*   BILITOTAL  --   --   --   --   --   --  0.6   ALKPHOS  --   --   --   --   --   --  101   ALT  --   --   --   --   --   --  89*   AST  --   --   --   --   --   --  109*    < > = values in this interval not displayed.     Recent Results (from the past 24 hour(s))   XR Chest Port 1 View    Narrative    CHEST ONE VIEW UPRIGHT 10/26/2019 11:40 AM     HISTORY: Hypoxia    COMPARISON: October 23, 2019      Impression    IMPRESSION: Mildly prominent cardiac fat pad at the left base again  noted. No definite acute infiltrates.    BENNIE NAYAK MD

## 2019-10-26 NOTE — PLAN OF CARE
"VSS on 5L NC except tachycardic-desats to 89-90% when sleeping. TELE A-fib w/ RVR. Pt often yelling for help but doesn't know what she needs, states \"I just want to die.\" Taking off gown, refusing to wear pulse ox monitor, pulling at tele leads.  Tylenol given x1 for \"all over pain.\"  No appetite except for a few bites of chocolate ice cream, ginger ale. Purewick in place, changed this shift-very low ephraim output (about 50 mL).  No BM.  Q2 T/R.  Sepsis fired, LA 1.6. Dressing to R groin puncture CDI, CMS intact.   Potassium replaced, recheck in am. Pt swallowing pills whole at beginning of shift, needed them crushed in applesauce at bedtime.  Frequent congested/wet cough, LS coarse.  Discharge pending clinical improvement, nursing continue to monitor.  "

## 2019-10-26 NOTE — PLAN OF CARE
Discharge Planner SLP   Patient plan for discharge: Not  addressed.   Current status: Bedside swallow evaluation completed. Patient presents with moderate to severe oral and pharyngeal dysphagia at bedside. She has audible wheezing and wet congested cough. She was unable to follow directions to complete an oral motor exam. She demonstrated premature entry and delayed swallow response with immediate Sx of aspiration with trials of thin liquids. Nectar thick liquids by the spoon there was reduced bolus control and premature entry without immediate Sx of aspiration, but can not rule out penetration. Poor oral control and movement of pudding. Due to poorly coordinated swallow secondary to her respiratory status. Patient is considered a high risk for aspiration.     Recommend: 1. NPO overnight, safest to give medications in IV form if unable only give essential medications crushed and placed in nectar thick water by spoon. 2. Will re-assess on 10/27/19.  Barriers to return to prior living situation: Acuity of her illness.   Recommendations for discharge: LTC  Rationale for recommendations: Patient will need on going ST needs for dysphagia management and diet tolerance due to being below her baseline.        Entered by: Narcisa Pardo 10/26/2019 10:21 AM

## 2019-10-26 NOTE — PROGRESS NOTES
MD Notification    Notified Person: MD    Notified Person Name: Dr. Reardon    Notification Date/Time: 0847 10/26    Notification Interaction: Telephone w/ readback    Purpose of Notification:  Pt having increased difficulty taking meds this morning. Pt needing increased cues to swallow and sip water. Patient hold meds in mouth when given with pudding and just chewing them. Can we can liquid Keppra?     Orders Received: SLP consluted. Liquid Keppra ordered    Comments:

## 2019-10-26 NOTE — PLAN OF CARE
Oxygen bumped up 6L NC, tried using oxymask, but pt constantly pulled it off. Tachycardic, HR in 100-130s, gave PRN metoprolol x 1. TELE A-fib w/ RVR. Purewick in place, urine output 100 mls, bladder scan 124 mls, note to MD to address. Turn and repo q 2 hours. Dressing to R groin puncture CDI, CMS intact. Very congested/wet cough, LS coarse. Alert to self only, frequently calls for help. Has not gotten out of bed.

## 2019-10-26 NOTE — PHARMACY-VANCOMYCIN DOSING SERVICE
Pharmacy Vancomycin Initial Note  Date of Service 2019  Patient's  1935  84 year old, female    Indication: Sepsis    Current estimated CrCl = Estimated Creatinine Clearance: 68 mL/min (based on SCr of 0.75 mg/dL).    Creatinine for last 3 days  10/24/2019:  7:29 AM Creatinine 0.71 mg/dL  10/25/2019:  6:52 AM Creatinine 0.67 mg/dL  10/26/2019:  2:11 PM Creatinine 0.75 mg/dL      Nephrotoxins and other renal medications (From now, onward)    Start     Dose/Rate Route Frequency Ordered Stop    10/26/19 1800  vancomycin (VANCOCIN) 2,000 mg in sodium chloride 0.9 % 500 mL intermittent infusion      2,000 mg  over 2 Hours Intravenous EVERY 12 HOURS 10/26/19 1738      10/26/19 1730  piperacillin-tazobactam (ZOSYN) 3.375 g vial to attach to  mL bag     Note to Pharmacy:  Pharmacy can adjust dose based on renal function.    3.375 g  over 30 Minutes Intravenous EVERY 6 HOURS 10/26/19 1720            Contrast Orders - past 72 hours (72h ago, onward)    Start     Dose/Rate Route Frequency Ordered Stop    10/25/19 1115  iopamidol (ISOVUE-300) IV solution 61% 50 mL      50 mL Intravenous ONCE 10/25/19 1100 10/25/19 1118    10/24/19 1130  iopamidol (ISOVUE-370) solution 79 mL      79 mL Intravenous ONCE 10/24/19 1128 10/24/19 1203                Plan:  1.  Start vancomycin  2000 mg IV q12h.   2.  Goal Trough Level: 15-20 mg/L   3.  Pharmacy will check trough levels as appropriate in 3-4 doses.    4. Serum creatinine levels will be ordered daily for the first week of therapy and at least twice weekly for subsequent weeks.    5. [place] method utilized to dose vancomycin therapy: Method 1    Capri Yen RPH

## 2019-10-26 NOTE — PLAN OF CARE
"Patient is alert to self only, increasingly lethargic today. VSS except tachycardic and tachypenic (22-24). Denied pain. Patient continually stating \"I'm dying.\" Tele Afib RVR. LS coarse. Congested, loose, nonproductive cough. Repeat CXR ordered today, see results. Patient on 6L NC, attempted to place patient on oxymask but patient was continually taking mask off and pulse ox. Patient also disconnected suction tubing as well today from raheem, pad wet but having low urine output after reconnecting raheem- MD aware. Patient having increased difficulty swallowing meds whole with pudding today, see previous note.  SLP consulted,  patient now NPO. Superficial skin tears to R posterior upper back and buttock cheek. Bruise to L 3 digit area. Turn and repo q2h. Pulsate mattress ordered today and patient transferred to mattress this afternoon. K 4.2. Discharge pending to LTCF. Nursing to continue monitor.    "

## 2019-10-26 NOTE — PROGRESS NOTES
10/26/19 1001   General Information   Onset Date 10/20/19   Start of Care Date 10/26/19   Referring Physician Dr. Reardon   Patient Profile Review/OT: Additional Occupational Profile Info See Profile for full history and prior level of function   Patient/Family Goals Statement Not able to state.    Swallowing Evaluation Bedside swallow evaluation   Behaviorial Observations Lethargic;Confused;Distractible   Mode of current nutrition Oral diet   Type of oral diet Regular;Thin liquid   Respiratory Status O2 Supply   Type of O2 supply Nasal cannula   Comments Per MD note: Sintia Connors is a 84 year old female with PMH significant for dementia with paranoia, HTN. Seizure disorder and CVA who was admitted for severe sepsis ,Pneumonia with SARA. She is noted to have DVT/PE and also issues with difficulty to control A.fib with RVR. Nurse obtained orders for bedside/clinical evaluation of swallowing due to increased difficulty noted at bedside.    Clinical Swallow Evaluation   Oral Musculature unable to assess due to poor participation/comprehension   Dentition present and adequate   Swallow Eval   Feeding Assistance dependent   Clinical Swallow Eval: Thin Liquid Texture Trial   Mode of Presentation, Thin Liquids spoon;straw;fed by clinician   Volume of Liquid or Food Presented 4 swallows of water   Oral Phase of Swallow Poor AP movement;Premature pharyngeal entry   Pharyngeal Phase of Swallow impaired;reduction in laryngeal movement;wet vocal quality after swallow;coughing/choking   Diagnostic Statement Overt Sx of aspiration.    Clinical Swallow Eval: Nectar Thick Liquid Texture Trial   Mode of Presentation, Nectar spoon;fed by clinician   Volume of Nectar Presented 4 teaspoons of water   Oral Phase, Nectar Poor AP movement;Premature pharyngeal entry   Pharyngeal Phase, Nectar impaired;reduction in laryngeal movement;repeated swallows   Diagnostic Statement No immediate Sx of aspiration but can not rule out silent  aspiration.    Clinical Swallow Eval: Puree Solid Texture Trial   Mode of Presentation, Puree spoon;fed by clinician   Volume of Puree Presented 1 teaspoon of pudding   Oral Phase, Puree Poor AP movement;Premature pharyngeal entry   Pharyngeal Phase, Puree impaired;coughing/choking;reduction in laryngeal movement;repeated swallows   Diagnostic Statement Poor bolus contol and AP movement.    Swallow Compensations   Swallow Compensations Reduce amounts;Multiple swallow;Pacing   Results Oral difficulties only;Suspect aspiration   General Therapy Interventions   Planned Therapy Interventions Dysphagia Treatment   Swallow Eval: Clinical Impressions   Skilled Criteria for Therapy Intervention Skilled criteria met.  Treatment indicated.   Functional Assessment Scale (FAS) 2   Treatment Diagnosis Moderate to severe oral and pharyngeal dysphagia   Diet texture recommendations NPO   Therapy Frequency Daily   Predicted Duration of Therapy Intervention (days/wks) 1 week   Anticipated Discharge Disposition extended care facility   Risks and Benefits of Treatment have been explained. Yes   Patient, family and/or staff in agreement with Plan of Care Yes   Clinical Impression Comments Patient presents with moderate to severe oral and pharyngeal dysphagia at bedside. She has audible wheezing and wet congested cough. She was unable to follow directions to complete an oral motor exam. She demonstrated premature entry and delayed swallow response with immediate Sx of aspiration with trials of thin liquids. Nectar thick liquids by the spoon there was reduced bolus control and premature entry without immediate Sx of aspiration, but can not rule out penetration. Poor oral control and movement of pudding. Due to poorly coordinated swallow secondary to her respiratory status. Patient is considered a high risk for aspiration. Recommend: 1. NPO overnight, safest to give medications in IV form if unable only give essential medications crushed  and placed in nectar thick water by spoon. 2. Will re-assess on 10/27/19.   Total Evaluation Time   Total Evaluation Time (Minutes) 17

## 2019-10-27 NOTE — CONSULTS
CLINICAL NUTRITION SERVICES  -  ASSESSMENT NOTE      RECOMMENDATIONS FOR MD/PROVIDER TO ORDER:     Pt has received <50% of assessed nutritional needs for the past 7+ days. Recommend considering initiation of more aggressive nutrition support, as SLP recommending NPO status at this time.    Recommendations Ordered by Registered Dietitian (RD):     MVI/M   Malnutrition:   % Weight Loss:  None noted  % Intake:  </= 50% for >/= 7 days (severe malnutrition)  Subcutaneous Fat Loss:  None observed --> may be masked by obesity   Muscle Loss:  None observed --> may be masked by obesity   Fluid Retention:  None noted    Malnutrition Diagnosis: Patient does not meet two of the above criteria necessary for diagnosing malnutrition but as risk for further decline     REASON FOR ASSESSMENT  Sintia Connors is a 84 year old female seen by Registered Dietitian for LOS    NUTRITION HISTORY  - Information obtained from the EMR.   - Pt was unable to provide detailed nutrition hx during visit today. Pt with dementia.   - PMH: Vascular dementia, Paranoia, Depression with psychotic features, Hypertension, Seizures, History of cerebrovascular accident, Hypothyroidism, Osteoporosis, and GERD   - Resides at [facility] facility     CURRENT NUTRITION ORDERS  Diet Order:     NPO     Current Intake/Tolerance:  10/20-10/25 --> regular diet  10/25-10/27 --> NPO     Per flowsheets, while pt was on a regular diet, she was consuming 0-25% of meals - taking only a few small bites at meal times or not eating at all.     Pt stated she feels hungry this AM     No BM since 10/24 (x 3)     10/27, SLP: Recommend continuation of strict NPO. Provide oral cares with trace amount of liquid on toothette. Safest route for med administration remains IV form; if unable only give essential medications crushed and placed in nectar thick water by tsp.     NUTRITION FOCUSED PHYSICAL ASSESSMENT FOR DIAGNOSING MALNUTRITION)  Completed:  Yes Full assessment         Observed:    No  "nutrition-related physical findings observed     ANTHROPOMETRICS  Height: 5' 2\"  Weight: 111.2 kg   Body mass index is 44.85 kg/m .  Weight Status:  Obesity Grade III BMI >40  IBW: 50 kg   % IBW: 224%  Weight History:   Wt Readings from Last 10 Encounters:   10/26/19 111.2 kg (245 lb 3.2 oz)   10/01/19 106.9 kg (235 lb 10.8 oz)   08/14/19 107.5 kg (237 lb)   11/10/17 74.4 kg (164 lb)   08/01/17 74.4 kg (164 lb)   06/20/17 74.4 kg (164 lb)   03/07/17 77.6 kg (171 lb)   09/13/16 77.6 kg (171 lb)   03/15/16 77.6 kg (171 lb)   09/25/15 77.6 kg (171 lb)     LABS  Labs reviewed  Na 147 (H)     MEDICATIONS  Medications reviewed  D5 @ 75 mL/hr = 90 gm CHO, 306 kcals     ASSESSED NUTRITION NEEDS PER APPROVED PRACTICE GUIDELINES:    Dosing Weight 65.3 kg (adjusted weight)  Estimated Energy Needs: 8575-2509 kcals (25-30 Kcal/Kg)  Justification: maintenance  Estimated Protein Needs: 78-98 grams protein (1.2-1.5 g pro/Kg)  Justification: preservation of lean body mass  Estimated Fluid Needs: 2648-9039  mL (25-30 mL/kg)  Justification: maintenance    MALNUTRITION:  % Weight Loss:  None noted  % Intake:  </= 50% for >/= 7 days (severe malnutrition)  Subcutaneous Fat Loss:  None observed --> may be masked by obesity   Muscle Loss:  None observed --> may be masked by obesity   Fluid Retention:  None noted    Malnutrition Diagnosis: Patient does not meet two of the above criteria necessary for diagnosing malnutrition but as risk for further decline    NUTRITION DIAGNOSIS:  Inadequate protein-energy intake related to altered swallowing funciton and altered GI function as evidenced by <50% intakes for the past 7+ days.     NUTRITION INTERVENTIONS  Recommendations / Nutrition Prescription    Advance diet as tolerated, per SLP    Pt has received <50% of assessed nutritional needs for the past 7+ days. Recommend considering initiation of more aggressive nutrition support, as SLP recommending NPO status at this time. "     MVI/M    Implementation  Nutrition education: Per Provider order if indicated   Multivitamin/Mineral    Nutrition Goals  Pt's diet will adv vs initiation of nutrition support in the next 24 hrs.     MONITORING AND EVALUATION:  Progress towards goals will be monitored and evaluated per protocol and Practice Guidelines    Fernanda Martinez RDN, LD    Clinical Dietitian  [facility]

## 2019-10-27 NOTE — PROVIDER NOTIFICATION
MD Notification    Notified Person: MD    Notified Person Name: Alexys    Notification Date/Time: 10/27/19 0738    Notification Interaction: Text page    Purpose of Notification: FYI speech recommending strict NPO tell re-eval today, can we change meds to IV if able?    Orders Received: Medications switched    Comments:

## 2019-10-27 NOTE — PLAN OF CARE
Discharge Planner PT   Patient plan for discharge: Home  Current status: Patient encountered supine in bed on 6L O2 via NC, RN reporting patient fatigued from recent procedure. Patient drowsy and agreeable to trial seated EOB. Patient requires max A x2 to complete supine to sit; seated EOB x3 min with feet unable to touch floor and maxA to maintain seated position to reduce risk of anterior sliding. Patient participating with trunk control and support through B UEs on bed with verbal cues for posture. Patient reporting fatigue and transitioned sit to supine with maxA x2 for LE positioning, trunk control, and cues for patient participation with patient assisting through LE movement. Patient required totalA for boosting in bed and Tatiana to cross arms over chest for comfort during repositioning. Repositioned pillows for reduced risk of excessive hip ER and reduced risk of pressure sores. Concluded session with patient supine in bed with all needs in reach and sitter at bedside.   Barriers to return to prior living situation: cognition, level of assist, deconditioning and weakness  Recommendations for discharge: TCU/ LTC- memory care  Rationale for recommendations: Patient requires maxA x2 for bed mobility and participating minimally in therapy. Patient did not tolerate full treatment session this afternoon but pleasant and participatory. Recommend further PT services during stay and at discharge to progress independence in mobility and increase activity tolerance. Recommend patient move to a room with a ceiling lift for increased upright mobility, safety, and ease of transfers.       Entered by: Migdalia Strickland 10/27/2019 3:52 PM

## 2019-10-27 NOTE — PLAN OF CARE
A&Ox1. Tachy to 110's. Tachypneic. AOVSS on 6L NC. R groin site CDI. CMS intact. Denies pain. Sleeping between cares. Tele AF CVR/RVR. LS dim. Exp Wheezes RUL. Abd distended. Tympaninc hypoactive bowel sounds. UOP low, 200 ml, dark ephraim colored. Reporting nausea this AM, zofran, and compazine given with relief. Incontinent. T/R q2h.

## 2019-10-27 NOTE — PLAN OF CARE
Alert, disoriented x4, yells out. HR tachy, tachypneic, other VSS on 6L NC. Tele: Afib UVR. LS: diminished, expiratory wheezes. Frequent nonproductive cough. BS: hypoactive, abd distended. Drain placed in LLQ to bulb suction, brown fowl smelling fluid, flushed q8. R groin site CDI. NPO no exceptions, speech assessing. Purewik, dark ephraim output. Repo q2. Up with A2, lift. R PIV infusing D5@75, abx. Plan pending. Continue to monitor.

## 2019-10-27 NOTE — CONSULTS
[facility] Surgery Consultation    Sintia Connors MRN# [id number]   Age: 84 year old YOB: 1935     Date of Admission:  10/20/2019    Reason for consult: bowel obstruction vs ileus in setting of pelvic abscess       Requesting physician: Salvatore Reardon       Level of consult: Consult, follow and place orders           Assessment and Recommendation:   Assessment:   This is an 84-year-old female with a complex past medical history who was recently admitted after being found down.  Among other medical issues she is now found to have a pelvic abscess and dilated colon. The source of the pelvic abscess remains unclear.  She currently has no signs of peritonitis or an acute abdomen. Certainly with her surgical history she is at risk for forming adhesions. There are no signs from CT scan and physical exam that an inguinal, femoral, or incisional hernia is contributing. Given the location of the abscess this could be from sigmoid diverticular disease. The distension of bowel could also be related to ileus from the abscess. Her WBC has been down trending since Abx were broadened and she currently has no pain. Would recommend continuing to monitor closely. Given her past medical history and current active medical problems as well as recent DNR/DNI status, if surgical management was indicated it would be crucial to discuss goals of care.      Recommendations:   Agree with IR consult for drainage  No current plan for surgical intervention  Continue NPO  Continue to replete electrolytes  Agree with IV Abx  Minimize opioids             Chief Complaint:   bowel obstruction     History is obtained from the patient partially but limited due to dementia. Majority of information obtained from chart.    This is an 84-year-old female with a complex past medical history who was recently admitted after being found down. She was subsequently found to have rhabdomyolysis, pneumonia, and bilateral subsegmental PEs as  "well as bilateral DVTs. She was found to have sepsis thought to be related to pneumonia. At this time it is unclear the source of the abscess. She recently underwent IVC filter placement on 10/25/2019. This was started because she was not able to be anticoagulated for her PEs due to her amyloid angiopathy. However she then had abdominal distention and increased abdominal pain.  Then had a CT scan She showing dilated a sending and transverse colon, in addition to a left deep pelvic abscess. Antibiotics were then broadened and interventional radiology was consulted for potential abscess drainage.      When I arrived in the room this morning she was working with speech pathology, she was not able to successfully swallow without signs of possible aspiration.  In addition her increased shortness of breath and wet cough were further indications of dysphasia.  She currently denies abdominal pain.  She is able to answer yes or no questions.  Asking more detailed information about her past leads to a lots of \" I do not know\" or \" I do not remember\".  Unclear when last BM was. It is unclear if she has had abdominal pain in the past. Per chart review, she has a history of constipation. She appears comfortable overall despite her breathing which is labored.  Of note from chart review she was switched to DNR/DNI overnight.    Of note she has had multiple abdominal surgeries.  She had an inguinal hernia repair, hiatal hernia repair, appendectomy, and total abdominal hysterectomy.    She currently denies nausea, vomiting, chest pain, abdominal pain.          Past Medical History:     Past Medical History:   Diagnosis Date     Atrial flutter (H) 4/12/15    post op ANW     Cerebral infarction (H)      GERD (gastroesophageal reflux disease)      PVC (premature ventricular contraction)      Rheumatoid arthritis (H)      S/P lumpectomy of breast     benign      Seizures (H)      Senile osteoporosis           Past Surgical History: "     Past Surgical History:   Procedure Laterality Date     APPENDECTOMY       Athroplasty right knee  2015     C TOTAL KNEE ARTHROPLASTY Left 2015     HERNIA REPAIR      hiatial     HERNIA REPAIR      inguial hernia repair     HYSTERECTOMY TOTAL ABDOMINAL, BILATERAL SALPINGO-OOPHORECTOMY, COMBINED       IR IVC FILTER PLACEMENT  10/25/2019          Social History:     Social History     Tobacco Use     Smoking status: Former Smoker     Packs/day: 0.00     Last attempt to quit: 1983     Years since quittin.2     Smokeless tobacco: Never Used   Substance Use Topics     Alcohol use: No     Alcohol/week: 0.0 standard drinks             Family History:     Family History   Problem Relation Age of Onset     Heart Disease No family hx of      Arrhythmia No family hx of      Family history reviewed          Immunizations:   Immunization status is unknown          Allergies:     Allergies   Allergen Reactions     Lorazepam Visual Disturbance     Severe hallucinations;          Medications:     Medications Prior to Admission   Medication Sig Dispense Refill Last Dose     acetaminophen (TYLENOL) 500 MG tablet Take 500 mg by mouth every 8 hours as needed for mild pain   prn     aspirin (ASA) 81 MG chewable tablet CHEW 1 TABLET BY MOUTH DAILY 30 tablet 3      calcium carbonate (TUMS) 500 MG chewable tablet Take 1 chew tab by mouth every 8 hours as needed for heartburn  90 tablet  prn     diclofenac (VOLTAREN) 1 % GEL APPLY 4 GRAMS TO KNEES OR 2 GRAMS TO HANDS FOUR TIMES DAILY USING ENCLOSED DOSING CARD. (Patient taking differently: Apply 4 g topically every 6 hours as needed APPLY 4 GRAMS TO KNEES OR 2 GRAMS TO HANDS FOUR TIMES DAILY USING ENCLOSED DOSING CARD.) 100 g 3 prn     doxycycline hyclate (VIBRAMYCIN) 100 MG capsule Take 100 mg by mouth 2 times daily X 7 days, started 10/17/2019        Glucos-Chond-Hyal Ac-Ca Fructo (MOVE FREE JOINT HEALTH ADVANCE) TABS Take 1 tablet by mouth daily 30 tablet 0       glucosamine-chondroitin 500-400 MG CAPS per capsule Take 1 capsule by mouth daily 270 capsule 3      levETIRAcetam (KEPPRA) 750 MG tablet TAKE 1 TABLET BY MOUTH TWO TIMES A DAY 60 tablet 3      levothyroxine (SYNTHROID/LEVOTHROID) 25 MCG tablet TAKE 1 TABLET (25MCG) BY MOUTH ONCE DAILY 90 tablet 3      metoprolol tartrate (LOPRESSOR) 25 MG tablet Take 1.5 tablets (37.5 mg) by mouth 2 times daily 270 tablet 3      omeprazole (PRILOSEC) 10 MG DR capsule TAKE ONE CAPSULE BY MOUTH DAILY 30 capsule 3      QUEtiapine (SEROQUEL) 50 MG tablet Take 2 tablets (100 mg) by mouth 2 times daily 60 tablet 0      Specialty Vitamins Products (WOM[employer]) MISC Take 5 capsules by mouth daily        vitamin B-Complex TAKE ONE TABLET BY MOUTH ONCE DAILY 30 tablet 3      vitamin D3 (CHOLECALCIFEROL) 1000 units (25 mcg) tablet Take 1 tablet (1,000 Units) by mouth daily 30 tablet 0      ORDER FOR DME Equipment being ordered: Shower Chair 1 each 0 Taking     ORDER FOR DME Equipment being ordered: Bath transfer bench  Fax to:[phone] 1 Device 0 Taking     ORDER FOR DME Equipment being ordered: transfer bath bench 1 Device 0 Taking             Review of Systems:   Unable to complete a comprehensive ROS due to mental status     Gen: no acute distress  Resp: Coarse upper airway sounds, mucus in posterior oropharynx, nasal cannula accessory muscles being used,   Abd: Protuberant, distended, nontender in all 4 quadrants, no evidence of inguinal or femoral hernias, no recent incisions or wounds on the abdomen, no peritonitis, tympanic in the upper quadrants.          Data:     WBC 36->28.5  Sodium 147  K 3.8  Hgb 13.7  Albumin 2.2    CT scan  IMPRESSION:  1. Bibasilar atelectasis.  2. Dilated ascending and transverse colon without definite cause for  obstruction demonstrated. There could be a distal transverse colon  stricture or dilated colon may be related to ileus. Small bowel loops  are mildly dilated and fluid-filled.  3. Mottled  low-density collection with foci of air in the inferior  LEFT pelvis is concerning for an abscess or possibly an infected  hematoma.     Poncho Anaya MD   Surgery

## 2019-10-27 NOTE — PLAN OF CARE
Alert to self only, very lethargic and confused this shift.  Still yelling for help.  NPO for aspiration precautions, speech to evaluate again tomorrow.  IV Zosyn and Vanco started, IV access lost and meds delayed.  Purewick in place and changed this shift, very little dark ephraim UOP.  Spoke with son, Pérez, who decided to change code status to DNR/DNI.  Hospitalist paged, code status ordered.  Q2 T/R. Abdominal CT showed possible bowel obstuction, abscess, IR to evaluate if able to drain.  Continue IV abx, continue to monitor. Discharge pending, son coming into town from [place] on Mon night.

## 2019-10-27 NOTE — IR NOTE
10 Swedish drain placed into left pelvic fluid collection. 150 ml foul smelling brown turbid fluid aspirated out. No acute complication.     Plan:  Drain to ANABEL suction  10cc NS tube flushes Q8hrs  Monitor outputs  F/U gm stain cx

## 2019-10-27 NOTE — PLAN OF CARE
Discharge Planner SLP   Patient plan for discharge: Not stated this date.   Current status: Swallow tx provided this date. Pt eager to participate in oral cares and po trials of nectar thick liquid by tsp. Immediate weak cough response noted secondary to completion of oral cares involving trace amount of thin liquid on toothette. SLP re-assessed tolerance to trials of 1/3 tsp of nectar thick liquid. Pt demonstrated increased work of breathing across 3/3 trials with delayed weak cough response subsequent to final trial. Pt able to complete additional cough with max cues. Pt continues to present with audible wheezing, weak cough response, inability to perform throat clear, as well as weak/barely audible vocal quality. Pt not appropriate for diet initiation at this time and remains at high risk for aspiration. Increased level of fatigue as session progressed.     Recommend continuation of strict NPO. Provide oral cares with trace amount of liquid on toothette. Safest route for med administration remains IV form; if unable only give essential medications crushed and placed in nectar thick water by tsp. SLP will continue to reassess and complete instrumental evaluation as indicated per pt progress and stamina to tolerate assessment.     Barriers to return to prior living situation: risk of aspiration, dysphagia, acuity of illness  Recommendations for discharge: LTC  Rationale for recommendations: Skilled ST indicated at next level of care to manage dysphagia and safe return to oral diet as able.        Entered by: Migdalia Scott 10/27/2019 10:50 AM

## 2019-10-27 NOTE — PROGRESS NOTES
[facility]    Medicine Progress Note - Hospitalist Service       Date of Admission:  10/20/2019  Assessment & Plan   Sintia Connors is a 84 year old female with PMH significant for dementia with paranoia, HTN. Seizure disorder and CVA who was admitted for severe sepsis, pneumonia with SARA. She was subsequently noted to have DVT/PE and also issues with difficulty to control A.fib with RVR. She was subsequently found to have a left pelvic abscess and possible bowel obstruction.    Bilateral subsegmental PEs  Bilateral DVT  CT chest obtained on 10/24 due to ongoing tachycardia, O2 requirement and persistently elevated WBC despite treatment of presumed pneumonia. This revealed bilateral pulmonary emboli predominantly in the subsegmental branches.  Neurology (NCC) consulted regarding initiation of anticoagulation in the setting of amyloid angiopathy and prior hx of intracranial bleed in the setting of anticoagulation. Neuro reviewed prior MRIs and did not recommend anticoagulation due extent of amyloid angiopathy on imaging and increased risk of intracranial bleed.  LE US on 10/24 also showed DVT in bilateral CFV, femoral veins, left popliteal, bilateral posterior tibial veins.  Due to extensive dvt and risk of fatal thromboembolic event and inability to anticoagulate, IVC filter was discussed with Niece (Cassandra) which she was in agreement. TTE (10/21 EF 60-65%, RV mod to severely dilated mildly decreased RV systolic function (prior echo 2015, poorly visualized RV but possible mild dilation)  - IVC filter placed by IR on 10/25/19.  - NO ANTICOAGULATION due to cerebral angiopathy and risk of intracranial hemorrhage, see neurology notes/recommendations, appreciate their assistance.  - Continue to monitor and wean oxygen as able.     Presumed LLL community acquired pneumonia  She was recently seen in the [place] ER, started on oral doxycycline for left lower lobe pneumonia. Per review [facility] notes patient  has issues with medication complience. CXR on admission with LLL infiltrate. WBC 31.6. LA 2.3. She was started on Levaquin on admission which is continued. Suspect SIRS is likely due to extensive DVT and PE rather than sepsis from pneumonia. F/u XRAy 2 days after admission without clear airspace disease.  CT of the chest on 10/24 does show atelectasis, but no clear pneumonia.   - Completes course of antibiotics for this today. Will be continued on antibiotics for newly diagnosed abscess as noted below.     Bowel obstruction vs ileus  - CT scan abdomen/pelvis on 10/26/19 showed dilated ascending and transverse colon due to possible stricture vs ileus.  - NPO.  - IV fluids.  - As needed pain and nausea medications.  - General surgery consulted, appreciate their assistance.    Pelvic abscess  Abdominal distention noted previously. Abdominal x-ray on 10/22/19 showed normal gas pattern.  - CT scan abdomen/pelvis showed possible bowel obstruction and pelvic abscess.  - Started on empiric vancomycin and zosyn on 10/26/19.  - IR consulted, plan for drain placement this morning.  - Unclear why she has developed a pelvic abscess.    Sepsis  Patient presented with marked leukocytosis (31), tachycardia (Afib/RVR), mildly increased lactic acid. Initially felt to be secondary to pneumonia, however she was subsequently diagnosed with bilateral pulmonary emboli which was then felt to be the source. Now, she has been found to have an abscess as noted above.  - Antibiotics as above.  - WBC trending down.  - Blood cultures negative to date.  - Treat underlying issues as noted.     Acute Hypoxic Respiratory Failure   Likely secondary to PE. Currently using 6 lpm by NC.  - Wean supplemental oxygen as able.     Acute on chronic HFpEF  She was up 3-4L in the setting of volume resuscitation from Rhabdomyolysis. Cr normalized. CK < 1000. CXR with mild edema on 10/23.  IVF stopped and received Lasix 20mg IV on 10/23 with decent urine output.     - O2 requirements going up.  - Chest x-ray today shows no acute cardiopulmonary disease.  - Wean O2 as able.  - Receiving IV fluids now in setting of sepsis. Will discontinue when able, may need diuresis again in the next few days.     Atrial fibrillation with RVR:   Has a history of atrial fibrillation/flutter in the past. Went into atrial fibrillation with RVR while in the ER. PTA regimen includes Metoprolol 37.5. Not on anticoagulation due to history of hemorraghic CVA and questionable amyloid angiopathy. Suspect this is driven by PE. HR has been difficult to control and currently ranging 100-120s today.   - Continue IV metoprolol while NPO.  - Continue digoxin 125mcg daily, will switch to IV while NPO.  - Appreciate Cardiology input, they have since signed off, 10/23.  - Continue PTA ASA when able to tolerate oral intake.     Hypokalemia  - Resolved with replacement.    Hyponatremia  Hyperchloremia  - Continue IV D5, increase rate.  - Recheck labs in AM.    Acute kidney injury, resolved: Baseline Cr 0.6-0.8  - Creatinine 2.89 on admission.  Likely pre-renal secondary to sepsis and Rhabdo.  - IVF stopped 10/22.  - Creatinine stable now and at baseline, 0.75 this morning.      Rhabdomyolysis.  CK level 3693  U /litre. Was found on the floor in her kitchen, unclear exactly how long she was on the floor UA with large blood but only 5 RBCs.  - Resolved with IV fluids.     Vascular dementia with behavioral disturbances  Paranoia  Depression with psychotic features  Amyloid angiopathy  - Hold PTA Seroquel for now while NPO.     Seizures  - Continue PTA Keppra, switched to IV for now while NPO.     History of Hemorraghic CVA 2017  - Occurred in setting of supra-therapeutic INR.     Hypothyroidism  - Hold PTA levothyroxine for now while NPO.     GERD  - Switch PPI to IV for now while NPO.     Goals of care  - Discussed with her son yesterday.  - Son changed code status to DNR/DNI yesterday.       Diet: Room  Service  NPO for Medical/Clinical Reasons Except for: Meds    DVT Prophylaxis: Pneumatic Compression Devices  Sheets Catheter: not present  Code Status: DNR/DNI      Disposition Plan   Expected discharge: 2 - 3 days, recommended to transitional care unit once adequate pain management/ tolerating PO medications, antibiotic plan established, safe disposition plan/ TCU bed available and SIRS/Sepsis treated.  Entered: Salvatore Reardon MD 10/27/2019, 8:33 AM       The patient's care was discussed with the Bedside Nurse and Patient.    Salvatore Reardon MD  Hospitalist Service  [facility]    ______________________________________________________________________    Interval History   Sintia Connors was seen this morning. More awake, but not back to baseline. Asked 'help me' multiple times while I was in the room. When asked what she needed help with, she said she needed a cup of coffee. Denies fevers, chest pain, shortness of breath, nausea. Abdomen feels a little bloated and tender.    Data reviewed today: I reviewed all medications, new labs and imaging results over the last 24 hours. I personally reviewed no images or EKG's today.    Physical Exam   Vital Signs: Temp: 95.6  F (35.3  C) Temp src: Oral BP: 119/63 Pulse: 99 Heart Rate: 58 Resp: 20 SpO2: 93 % O2 Device: Nasal cannula Oxygen Delivery: 6 LPM  Weight: 245 lbs 3.2 oz  Constitutional: awake, more alert, cooperative, no apparent distress  Respiratory: coarse at the bases  Cardiovascular: regular rate and rhythm, normal S1 and S2, and no murmur noted  GI: decreased bowel sounds, soft, distended, generalized tenderness.  Skin: warm, dry  Neurologic: awake, more alert, not oriented    Data   Recent Labs   Lab 10/27/19  0736 10/26/19  1411 10/26/19  0926 10/25/19  0652  10/22/19  1223   WBC 28.3* 36.2*  --  20.3*   < >  --    HGB 13.7 14.6  --  13.6   < >  --    MCV 91 91  --  90   < >  --     252  --  221   < >  --    * 148*  --  146*    < >  --    POTASSIUM 3.8 4.3 4.2 4.0   < >  --    CHLORIDE 121* 120*  --  119*   < >  --    CO2 21 22  --  21   < >  --    BUN 27 25  --  22   < >  --    CR 0.75 0.75  --  0.67   < >  --    ANIONGAP 5 6  --  6   < >  --    FLAVIA 8.9 9.5  --  9.0   < >  --    * 132*  --  110*   < >  --    ALBUMIN 2.2*  --   --   --   --  2.0*   PROTTOTAL 5.8*  --   --   --   --  6.1*   BILITOTAL 1.0  --   --   --   --  0.6   ALKPHOS 88  --   --   --   --  101   ALT 50  --   --   --   --  89*   AST 23  --   --   --   --  109*    < > = values in this interval not displayed.     Recent Results (from the past 24 hour(s))   XR Chest Port 1 View    Narrative    CHEST ONE VIEW UPRIGHT 10/26/2019 11:40 AM     HISTORY: Hypoxia    COMPARISON: October 23, 2019      Impression    IMPRESSION: Mildly prominent cardiac fat pad at the left base again  noted. No definite acute infiltrates.    BENNIE NAYAK MD   CT Abdomen Pelvis w/o Contrast    Narrative    CT ABDOMEN PELVIS WITHOUT CONTRAST   10/26/2019 3:59 PM     HISTORY: Abdominal pain, acute, generalized.    TECHNIQUE:   No IV contrast material. Radiation dose for this scan was  reduced using automated exposure control, adjustment of the mA and/or  kV according to patient size, or iterative reconstruction technique.    COMPARISON: None.    FINDINGS:  Airspace opacities noted in the posterior aspects of both  lower lobes that appear associated with volume loss compatible with  bibasilar atelectasis. There is a large hiatal hernia present.  Moderate coronary atherosclerosis noted.    The unenhanced liver, spleen, pancreas, adrenal glands, and kidneys  contain no worrisome abnormalities. An IVC filter is noted. The  ascending and transverse colon are dilated without cause for  obstruction demonstrated. The descending colon, sigmoid colon, and  rectum are decompressed. Dilated transverse colon measures up to 8.4  cm. Cecum diameter measures up to 14.7 cm.    In the left side of the pelvis,  there is a mottled low-density  collection with several foci of air that measures approximately 11.2 x  5.4 cm in the axial plane (series 3, image 76) and approximately 4.7  cm craniocaudal. The uterus is absent.    No large abdominal or retroperitoneal lymph nodes. No pelvic  adenopathy. No free air or ascites. No lytic or blastic bone lesions.      Impression    IMPRESSION:  1. Bibasilar atelectasis.  2. Dilated ascending and transverse colon without definite cause for  obstruction demonstrated. There could be a distal transverse colon  stricture or dilated colon may be related to ileus. Small bowel loops  are mildly dilated and fluid-filled.  3. Mottled low-density collection with foci of air in the inferior  LEFT pelvis is concerning for an abscess or possibly an infected  hematoma.     VLAD ARGUELLO MD     Medications     D5W 50 mL/hr at 10/27/19 0000       [Held by provider] aspirin  81 mg Oral Daily     digoxin  125 mcg Intravenous Daily     levETIRAcetam  750 mg Intravenous BID     [Held by provider] levothyroxine  25 mcg Oral QAM AC     metoprolol  2.5 mg Intravenous Q6H     pantoprazole (PROTONIX) IV  20 mg Intravenous Daily with breakfast     piperacillin-tazobactam  3.375 g Intravenous Q6H     [Held by provider] QUEtiapine  100 mg Oral BID     sodium chloride (PF)  3 mL Intracatheter Q8H     vancomycin (VANCOCIN) IV  2,000 mg Intravenous Q12H

## 2019-10-28 NOTE — PROGRESS NOTES
[facility]    Medicine Progress Note - Hospitalist Service       Date of Admission:  10/20/2019  Assessment & Plan   Sintia Connors is a 84 year old female with PMH significant for dementia with paranoia, HTN. Seizure disorder and CVA who was admitted for severe sepsis, pneumonia with SARA. She was subsequently noted to have DVT/PE and also issues with difficulty to control A.fib with RVR. She was subsequently found to have a left pelvic abscess and possible bowel obstruction.    Bilateral subsegmental PEs  Bilateral DVT  CT chest obtained on 10/24 due to ongoing tachycardia, O2 requirement and persistently elevated WBC despite treatment of presumed pneumonia. This revealed bilateral pulmonary emboli predominantly in the subsegmental branches.  Neurology (NCC) consulted regarding initiation of anticoagulation in the setting of amyloid angiopathy and prior hx of intracranial bleed in the setting of anticoagulation. Neuro reviewed prior MRIs and did not recommend anticoagulation due extent of amyloid angiopathy on imaging and increased risk of intracranial bleed.  LE US on 10/24 also showed DVT in bilateral CFV, femoral veins, left popliteal, bilateral posterior tibial veins.  Due to extensive dvt and risk of fatal thromboembolic event and inability to anticoagulate, IVC filter was discussed with Niece (Cassandra) which she was in agreement. TTE (10/21 EF 60-65%, RV mod to severely dilated mildly decreased RV systolic function (prior echo 2015, poorly visualized RV but possible mild dilation)  - IVC filter placed by IR on 10/25/19.  - NO ANTICOAGULATION due to cerebral angiopathy and risk of intracranial hemorrhage, see neurology notes/recommendations, appreciate their assistance.  - Continue to monitor and wean oxygen as able.     Presumed LLL community acquired pneumonia  She was recently seen in the [place] ER, started on oral doxycycline for left lower lobe pneumonia. Per review [facility] notes patient  has issues with medication complience. CXR on admission with LLL infiltrate. WBC 31.6. LA 2.3. She was started on Levaquin on admission which is continued. Suspect SIRS is likely due to extensive DVT and PE rather than sepsis from pneumonia. F/u XRAy 2 days after admission without clear airspace disease.  CT of the chest on 10/24 does show atelectasis, but no clear pneumonia.   - Completed course of antibiotics for this today. Will be continued on antibiotics for newly diagnosed abscess as noted below.     Bowel obstruction vs ileus  - CT scan abdomen/pelvis on 10/26/19 showed dilated ascending and transverse colon due to possible stricture vs ileus.  - NPO.  - IV fluids.  - As needed pain and nausea medications.  - General surgery following.  - Abdominal x-ray today shows worsening colonic dilation. NG tube ordered by surgery today.    Pelvic abscess  Abdominal distention noted previously. Abdominal x-ray on 10/22/19 showed normal gas pattern.  - CT scan abdomen/pelvis showed possible bowel obstruction and pelvic abscess.  - Started on empiric vancomycin and zosyn on 10/26/19.  - IR consulted, had drain placed on 10/27/19.  - Culture of abscess fluid is growing gram positive cocci and gram negative rods.  - Unclear why she has developed a pelvic abscess.    Sepsis  Patient presented with marked leukocytosis (31), tachycardia (Afib/RVR), mildly increased lactic acid. Initially felt to be secondary to pneumonia, however she was subsequently diagnosed with bilateral pulmonary emboli which was then felt to be the source. Now, she has been found to have an abscess as noted above.  - Antibiotics as above.  - WBC trending down.  - Blood cultures negative to date.  - Treat underlying issues as noted.     Acute Hypoxic Respiratory Failure   Likely secondary to PE. Currently using 5 lpm by NC.  - Wean supplemental oxygen as able.     Acute on chronic HFpEF  She was up 3-4L in the setting of volume resuscitation from  Rhabdomyolysis. Cr normalized. CK < 1000. CXR with mild edema on 10/23.  IVF stopped and received Lasix 20mg IV on 10/23 with decent urine output.    - O2 requirements improved slightly today.  - Chest x-ray today shows no acute cardiopulmonary disease.  - Wean O2 as able.  - Receiving IV fluids now in setting of sepsis. Will discontinue when able, may need diuresis again in the next few days.     Atrial fibrillation with RVR  Has a history of atrial fibrillation/flutter in the past. Went into atrial fibrillation with RVR while in the ER. PTA regimen includes Metoprolol 37.5. Not on anticoagulation due to history of hemorraghic CVA and questionable amyloid angiopathy. Suspect this is driven by PE. HR has been difficult to control and currently ranging 100-120s today.   - Continue IV metoprolol while NPO. (had been titrated up to metoprolol  mg PO BID prior to becoming NPO).  - Continue digoxin 125mcg daily, will switch to IV while NPO.  - Appreciate Cardiology input, they have since signed off, 10/23.  - Continue PTA ASA when able to tolerate oral intake.     Hypokalemia  - Resolved with replacement.    Hyponatremia  Hyperchloremia  - Both trending up this morning.  - Continue IV D5, increase rate to 100 ml/hr.  - Recheck labs in AM.    Acute kidney injury, resolved: Baseline Cr 0.6-0.8  - Creatinine 2.89 on admission.  Likely pre-renal secondary to sepsis and Rhabdo.  - IVF stopped 10/22.  - Creatinine stable up to 1.06 this morning.  - Recheck in AM.      Rhabdomyolysis.  CK level 3693  U /litre. Was found on the floor in her kitchen, unclear exactly how long she was on the floor UA with large blood but only 5 RBCs.  - Resolved with IV fluids.     Vascular dementia with behavioral disturbances  Paranoia  Depression with psychotic features  Amyloid angiopathy  - Hold PTA Seroquel for now while NPO.  - She will get a dose of IV zyprexa this afternoon, has become quite agitated following placement of NG  tube.     Seizures  - Continue PTA Keppra, switched to IV for now while NPO.     History of Hemorraghic CVA 2017  - Occurred in setting of supra-therapeutic INR.     Hypothyroidism  - Hold PTA levothyroxine for now while NPO.     GERD  - Continue IV PPI for now.     Goals of care  - Discussed with her son again today by phone. He is flying to [place] tonight and will be here in the hospital tomorrow morning. Will discuss further with him then.  - Son changed code status to DNR/DNI on 10/26/19.     Diet: Room Service  NPO for Medical/Clinical Reasons Except for: Meds    DVT Prophylaxis: Pneumatic Compression Devices  Sheets Catheter: not present  Code Status: DNR/DNI      Disposition Plan   Expected discharge: 2 - 3 days, recommended to transitional care unit once adequate pain management/ tolerating PO medications, antibiotic plan established, safe disposition plan/ TCU bed available and SIRS/Sepsis treated.  Entered: Salvatore Reardon MD 10/28/2019, 3:22 PM       The patient's care was discussed with the Bedside Nurse, Patient and Patient's Family.    Salvatore Reardon MD  Hospitalist Service  [facility]    ______________________________________________________________________    Interval History   Sintia Connors was seen earlier this afternoon. Laying in bed. Confused, difficult to get any history from her. Did not appear to be in distress. More alert and talking more today, but conversation was very difficult to follow. Denies chest pain, shortness of breath, nausea, abdominal pain.    Data reviewed today: I reviewed all medications, new labs and imaging results over the last 24 hours. I personally reviewed no images or EKG's today.    Physical Exam   Vital Signs: Temp: 98.1  F (36.7  C) Temp src: Axillary BP: 121/64 Pulse: 112 Heart Rate: 102 Resp: 20 SpO2: 90 % O2 Device: Nasal cannula with humidification Oxygen Delivery: 5 LPM  Weight: 245 lbs 3.2 oz  Constitutional: awake, alert,  cooperative, no apparent distress, and appears stated age  Respiratory: clear to auscultation bilaterally, no crackles or wheezing  Cardiovascular: regular rate and rhythm, normal S1 and S2, and no murmur noted  GI: decreased bowel sounds, soft, distended, mild generalized tenderness, no rebound or guarding  Skin: warm, dry  Neurologic: awake, alert, not oriented    Data   Recent Labs   Lab 10/28/19  0710 10/27/19  0736 10/26/19  1411  10/22/19  1223   WBC 21.1* 28.3* 36.2*   < >  --    HGB 13.3 13.7 14.6   < >  --    MCV 92 91 91   < >  --     213 252   < >  --    * 147* 148*   < >  --    POTASSIUM 3.7 3.8 4.3   < >  --    CHLORIDE 121* 121* 120*   < >  --    CO2 24 21 22   < >  --    BUN 23 27 25   < >  --    CR 1.06* 0.75 0.75   < >  --    ANIONGAP 5 5 6   < >  --    FLAVIA 9.0 8.9 9.5   < >  --    * 139* 132*   < >  --    ALBUMIN  --  2.2*  --   --  2.0*   PROTTOTAL  --  5.8*  --   --  6.1*   BILITOTAL  --  1.0  --   --  0.6   ALKPHOS  --  88  --   --  101   ALT  --  50  --   --  89*   AST  --  23  --   --  109*    < > = values in this interval not displayed.     Recent Results (from the past 24 hour(s))   XR Abdomen Port 1 View    Narrative    ABDOMEN ONE VIEW PORTABLE  10/28/2019 9:45 AM     HISTORY: Abdominal distention.    COMPARISON: October 22, 2019       Impression    IMPRESSION: Marked increase in colonic distention since the comparison  study, severe distention of the cecum noted. There is no cecal  volvulus on a CT from a procedural scan one day prior. Differential  includes severe colonic ileus versus colonic obstruction.    BENNIE NAYAK MD     Medications     D5W 75 mL/hr at 10/28/19 1051       [Held by provider] aspirin  81 mg Oral Daily     digoxin  125 mcg Intravenous Daily     levETIRAcetam  750 mg Intravenous BID     [Held by provider] levothyroxine  25 mcg Oral QAM AC     metoprolol  2.5 mg Intravenous Q6H     multivitamins w/minerals  15 mL Per Feeding Tube Daily      pantoprazole (PROTONIX) IV  20 mg Intravenous Daily with breakfast     piperacillin-tazobactam  3.375 g Intravenous Q6H     [Held by provider] QUEtiapine  100 mg Oral BID     sodium chloride (PF)  10 mL Irrigation Q8H     sodium chloride (PF)  3 mL Intracatheter Q8H     vancomycin place price - receiving intermittent dosing  1 each Intravenous See Admin Instructions

## 2019-10-28 NOTE — PHARMACY-VANCOMYCIN DOSING SERVICE
Pharmacy Vancomycin Note  Date of Service 2019  Patient's  1935   84 year old, female    Indication: Sepsis  Goal Trough Level: 15-20 mg/L  Day of Therapy: 3  Current Vancomycin regimen:  2000 mg IV q12h    Current estimated CrCl = Estimated Creatinine Clearance: 48.1 mL/min (A) (based on SCr of 1.06 mg/dL (H)).    Creatinine for last 3 days  10/26/2019:  2:11 PM Creatinine 0.75 mg/dL  10/27/2019:  7:36 AM Creatinine 0.75 mg/dL  10/28/2019:  7:10 AM Creatinine 1.06 mg/dL    Recent Vancomycin Levels (past 3 days)  10/28/2019:  7:10 AM Vancomycin Level 34.3 mg/L    Vancomycin IV Administrations (past 72 hours)                   vancomycin (VANCOCIN) 2,000 mg in sodium chloride 0.9 % 500 mL intermittent infusion (mg) 2,000 mg New Bag 10/27/19 1956     2,000 mg New Bag  0907     2,000 mg New Bag 10/26/19 1956                Nephrotoxins and other renal medications (From now, onward)    Start     Dose/Rate Route Frequency Ordered Stop    10/28/19 0816  vancomycin place price - receiving intermittent dosing      1 each Intravenous SEE ADMIN INSTRUCTIONS 10/28/19 0816      10/26/19 1730  piperacillin-tazobactam (ZOSYN) 3.375 g vial to attach to  mL bag     Note to Pharmacy:  Pharmacy can adjust dose based on renal function.    3.375 g  over 30 Minutes Intravenous EVERY 6 HOURS 10/26/19 1720               Contrast Orders - past 72 hours (72h ago, onward)    Start     Dose/Rate Route Frequency Ordered Stop    10/25/19 1115  iopamidol (ISOVUE-300) IV solution 61% 50 mL      50 mL Intravenous ONCE 10/25/19 1100 10/25/19 1118          Interpretation of levels and current regimen:  Trough level is  Supratherapeutic    Has serum creatinine changed > 50% in last 72 hours: No    Urine output:  unable to determine    Renal Function: creat increasing    Plan:  1.  change to intermittent dosing for now  2.  Pharmacy will check trough levels as appropriate in in 12 hours.    3. Serum creatinine levels will be  ordered daily for the first week of therapy and at least twice weekly for subsequent weeks.      Kalyn Ross RPH        .

## 2019-10-28 NOTE — PLAN OF CARE
[id number]  Pt lethargic, unable to answer most questions.  VSS on RA, denies pain. Remains NPO. Purewick replaced, Q2 T/R.  Potassium replacement started, needs recheck tomorrow am.  ANABEL drain with moderate brown drainage.  Discharge pending.

## 2019-10-28 NOTE — PROGRESS NOTES
SPIRITUAL HEALTH SERVICES Progress Note  [facility]    I have made several attempts to visit with pt/family in the past few days, due to pt's extended hospital stay.  Pt is often sleeping and otherwise is apparently not able to engage in conversation.  I and other chaplains are available, if pt/family request spiritual support.                                                                                                                                                 Mara Paz M.A.  Staff   Pager [phone]  Phone [phone]

## 2019-10-28 NOTE — PLAN OF CARE
Oriented to self and place overnight. VSS on 5L O2 weaned from 6, frequently taking nasal cannula off, checked sats and pt at 87% on RA. Denies pain. Pt more alert overnight and threatening to staff, being combative with cares but other times pleasant and cooperative- really wanting food and stating she is hungry. Completing oral cares d/t dry mouth, helps ease agitation. NPO, speech following. Abdomen distended, BS hypoactive, had 1 large looser BM overnight. Purewick in place, changed at 0500. IVC filter site dressing CDI. ANABEL dressing CDI, milky brown output. Pt pulled PIV x2 overnight, mitts not effective as pt gets more agitated and is able to take off straps with teeth. New PIV in R hand infusing D5NS @ 75 with int zosyn. Discharge pending clinical improvement.

## 2019-10-28 NOTE — PLAN OF CARE
Discharge Planner SLP   Patient plan for discharge: Unable to state   Current status: Swallow Tx was provided this am.  Pt was awake, but confused and demonstrated talking throughout po trials with poor awareness of bolus and swallow initiation noted despite mod-max cues.  Pt demonstrated increased SOB as trials continued.  Throat clear noted after thin liquids by spoon.      Pt remains at high risk for silent aspiration due to dementia/poor awareness of bolus and swallow initiation.    Continued NPO status is the safest option; however, recommend MD/family discussion on POC wishes for NPO vs initiate po intake despite the risk for aspiration.  Patient's baseline swallow function is not known; however note dementia, current pneumonia and hx of pneumonia.  If po initiated, least hazardous option is clear liquid nectar thick liquids by spoon.  Will defer to MD/family wishes.    SLP to continue Tx 10/29 as indicated.     Barriers to return to prior living situation: risk of aspiration, dysphagia, acuity of illness  Recommendations for discharge: To be determined per nutrition/overall POC  Rationale for recommendations: To be determined per nutrition/overall POC       Entered by: Gabrielle Eastman 10/28/2019 9:16 AM

## 2019-10-28 NOTE — CONSULTS
GASTROENTEROLOGY CONSULTATION     Sintia Connors   [facility]  [address] [facility] [id number]   84 year old female   Admission Date/Time: 10/20/2019   Encounter Date: 10/28/2019  Primary Care Provider: [facility]     Referring / Attending Physician: Philip Reardon   We were asked to see the patient in consultation by Dr. Reardon for evaluation of colonic dilation.     HPI: Sintia Connors is a 84 year old female with a past medical history significant for dementia with paranoia, hypertension, seizure disorder and previous CVA who is admitted with severe sepsis, pneumonia and acute kidney injury.  The patient was subsequently found to have DVT and bilateral PEs along with difficult to control A. fib with RVR.  As she was undergoing further evaluation of her pulmonary emboli she was also find to have a left pelvic abscess as well as a possible bowel obstruction.  GI was consulted for further evaluation.  The patient is unable to give me any meaningful history so her history is obtained completely from the chart and nursing staff.  The patient was recently seen in the emergency department for presumed pneumonia and was started on doxycycline.  She had ongoing tachycardia and a CT of the chest was done on October 24.  This revealed bilateral pulmonary emboli predominantly in the subsegmental branches.  Ultrasound that same day showed DVTs in bilateral CFV, femoral veins, left popliteal veins and bilateral posterior tibial veins.  An IVC filter was placed the next day.  Given the persistent elevation of her white count a CT of the abdomen pelvis was performed on October 26 which suggested a dilated ascending and transverse colon without definite cause for obstruction.  Small bowel loops were also mildly dilated and fluid-filled.  There was a mottled low-density collection with foci of air in the inferior left pelvis.  Drainage was accomplished via CT guidance  yesterday.    Past Medical History  Past Medical History:   Diagnosis Date     Atrial flutter (H) 4/12/15    post op ANW     Cerebral infarction (H)      GERD (gastroesophageal reflux disease)      PVC (premature ventricular contraction)      Rheumatoid arthritis (H)      S/P lumpectomy of breast     benign      Seizures (H)      Senile osteoporosis        Past Surgical History  Past Surgical History:   Procedure Laterality Date     APPENDECTOMY       Athroplasty right knee  2015     C TOTAL KNEE ARTHROPLASTY Left 2015     HERNIA REPAIR      hiatial     HERNIA REPAIR      inguial hernia repair     HYSTERECTOMY TOTAL ABDOMINAL, BILATERAL SALPINGO-OOPHORECTOMY, COMBINED       IR IVC FILTER PLACEMENT  10/25/2019       Family History  Family History   Problem Relation Age of Onset     Heart Disease No family hx of      Arrhythmia No family hx of        Social History  Social History     Socioeconomic History     Marital status:      Spouse name: Not on file     Number of children: Not on file     Years of education: Not on file     Highest education level: Not on file   Occupational History     Not on file   Social Needs     Financial resource strain: Not on file     Food insecurity:     Worry: Not on file     Inability: Not on file     Transportation needs:     Medical: Not on file     Non-medical: Not on file   Tobacco Use     Smoking status: Former Smoker     Packs/day: 0.00     Last attempt to quit: 1983     Years since quittin.2     Smokeless tobacco: Never Used   Substance and Sexual Activity     Alcohol use: No     Alcohol/week: 0.0 standard drinks     Drug use: No     Sexual activity: Never   Lifestyle     Physical activity:     Days per week: Not on file     Minutes per session: Not on file     Stress: Not on file   Relationships     Social connections:     Talks on phone: Not on file     Gets together: Not on file     Attends Jewish service: Not on file     Active member of club or  organization: Not on file     Attends meetings of clubs or organizations: Not on file     Relationship status: Not on file     Intimate partner violence:     Fear of current or ex partner: Not on file     Emotionally abused: Not on file     Physically abused: Not on file     Forced sexual activity: Not on file   Other Topics Concern     Parent/sibling w/ CABG, MI or angioplasty before 65F 55M? Not Asked      Service Not Asked     Blood Transfusions Not Asked     Caffeine Concern No     Comment: coffee: 1/2 cup in the morning     Occupational Exposure Not Asked     Hobby Hazards Not Asked     Sleep Concern No     Stress Concern No     Weight Concern No     Special Diet No     Back Care Not Asked     Exercise Yes     Comment: stationary bike daily, walking around condo     Bike Helmet Not Asked     Seat Belt Yes     Self-Exams Not Asked   Social History Narrative    No longer  - Living in a cond - looking to move to a more senior living    3 children - Chica living in [facility], Basil lives in [facility], [place] in [employer]       Medications  Prior to Admission medications    Medication Sig Start Date End Date Taking? Authorizing Provider   acetaminophen (TYLENOL) 500 MG tablet Take 500 mg by mouth every 8 hours as needed for mild pain   Yes Reported, Patient   aspirin (ASA) 81 MG chewable tablet CHEW 1 TABLET BY MOUTH DAILY 4/22/19  Yes Brie Newberry APRN CNP   calcium carbonate (TUMS) 500 MG chewable tablet Take 1 chew tab by mouth every 8 hours as needed for heartburn    Yes Lazaro Gupta MD   diclofenac (VOLTAREN) 1 % GEL APPLY 4 GRAMS TO KNEES OR 2 GRAMS TO HANDS FOUR TIMES DAILY USING ENCLOSED DOSING CARD.  Patient taking differently: Apply 4 g topically every 6 hours as needed APPLY 4 GRAMS TO KNEES OR 2 GRAMS TO HANDS FOUR TIMES DAILY USING ENCLOSED DOSING CARD. 9/13/16  Yes Lazaro Gupta MD   doxycycline hyclate (VIBRAMYCIN) 100 MG capsule Take 100 mg by mouth 2  times daily X 7 days, started 10/17/2019   Yes Unknown, Entered By History   Glucos-Chond-Hyal Ac-Ca Fructo (MOVE FREE [facility]) TABS Take 1 tablet by mouth daily 10/1/19  Yes Brie Newberry APRN CNP   glucosamine-chondroitin 500-400 MG CAPS per capsule Take 1 capsule by mouth daily 11/2/18  Yes Brie Newberry APRN CNP   levETIRAcetam (KEPPRA) 750 MG tablet TAKE 1 TABLET BY MOUTH TWO TIMES A DAY 9/25/19  Yes Brie Newberry APRN CNP   levothyroxine (SYNTHROID/LEVOTHROID) 25 MCG tablet TAKE 1 TABLET (25MCG) BY MOUTH ONCE DAILY 10/9/19  Yes Sharee Ewing APRN CNP   metoprolol tartrate (LOPRESSOR) 25 MG tablet Take 1.5 tablets (37.5 mg) by mouth 2 times daily 11/2/18  Yes Brie Newberry APRN CNP   omeprazole (PRILOSEC) 10 MG DR capsule TAKE ONE CAPSULE BY MOUTH DAILY 7/22/19  Yes Brie Newberry APRN CNP   QUEtiapine (SEROQUEL) 50 MG tablet Take 2 tablets (100 mg) by mouth 2 times daily 10/1/19  Yes Brie Newberry APRN CNP   Specialty Vitamins Products ([employer]OM[employer]) MISC Take 5 capsules by mouth daily   Yes Unknown, Entered By History   vitamin B-Complex TAKE ONE TABLET BY MOUTH ONCE DAILY 10/2/19  Yes Brie Newberry APRN CNP   vitamin D3 (CHOLECALCIFEROL) 1000 units (25 mcg) tablet Take 1 tablet (1,000 Units) by mouth daily 10/1/19  Yes Brie Newberry APRN CNP   ORDER FOR DME Equipment being ordered: Shower Chair 3/31/15   Lazaro Gupta MD   ORDER FOR DME Equipment being ordered: Bath transfer bench  Fax to:[phone] 3/9/15   Lazaro Gupta MD   ORDER FOR DME Equipment being ordered: transfer bath bench 12/1/14   Lazaro Gupta MD       Allergies:  Lorazepam    ROS: A ten point review of systems was obtained and negative other than the symptoms noted above in the HPI.     Physical Exam:   /64 (BP Location: Right arm)   Pulse 112   Temp 98.1  F (36.7  C) (Axillary)   Resp  "20   Ht 1.575 m (5' 2\")   Wt 111.2 kg (245 lb 3.2 oz)   SpO2 90%   BMI 44.85 kg/m     Constitutional: age appropriate, alert, no acute distress  Cardiovascular: regular rate and rhythm, no murmurs,rubs or gallops  Respiratory: clear to auscultation bilaterally  Psychiatric: normal pleasant affect  Head: atraumatic, normocephalic  Neck: supple, no thyromegaly  ENT: mucous membranes are moist, no oral lesions are noted  Abdomen: soft, non-tender, distended, hypoactive bowel sound. No masses or hepatosplenomegaly is appreciated. No rebound tenderness or guarding  Neuro: Neurologically intact grossly  Skin: warm, dry, no rashes are noted    ADDITIONAL COMMENTS:   I reviewed the patient's new clinical lab test results.   Recent Labs   Lab Test 10/28/19  0710 10/27/19  0736 10/26/19  1411  05/08/17 04/10/17 03/27/17   WBC 21.1* 28.3* 36.2*   < >  --   --   --    HGB 13.3 13.7 14.6   < >  --   --   --    MCV 92 91 91   < >  --   --   --     213 252   < >  --   --   --    INR  --   --   --   --  3.0 2.5 2.5    < > = values in this interval not displayed.      Recent Labs   Lab Test 10/28/19  0710 10/27/19  0736 10/26/19  1411   * 147* 148*   POTASSIUM 3.7 3.8 4.3   CHLORIDE 121* 121* 120*   CO2 24 21 22   BUN 23 27 25   CR 1.06* 0.75 0.75   ANIONGAP 5 5 6   FLAVIA 9.0 8.9 9.5      Recent Labs   Lab Test 10/27/19  0736 10/22/19  1223 10/20/19  1433 07/29/18  1200  12/23/13  1630  09/26/13  0530   ALBUMIN 2.2* 2.0*  --  3.8   < >  --    < >  --    BILITOTAL 1.0 0.6  --  0.5   < >  --    < >  --    ALT 50 89*  --  26   < >  --    < >  --    AST 23 109*  --  22   < >  --    < >  --    ALKPHOS 88 101  --  65   < >  --    < >  --    PROTEIN  --   --  100*  --   --  10*  --  Negative    < > = values in this interval not displayed.     10/24/19 CT Chest  IMPRESSION:  1. Bilateral pulmonary emboli, predominantly in subsegmental branches.  Given CT suggestion of right heart strain with RV/LV ratio of 1.3 and  reflux in " the hepatic veins, would recommend echocardiogram. Also  recommend anticoagulation if clinically appropriate.  2. Small bilateral pleural effusions.  3. Moderate hiatal hernia.       10/26/19 CT abdomen pelvis  IMPRESSION:  1. Bibasilar atelectasis.  2. Dilated ascending and transverse colon without definite cause for  obstruction demonstrated. There could be a distal transverse colon  stricture or dilated colon may be related to ileus. Small bowel loops  are mildly dilated and fluid-filled.  3. Mottled low-density collection with foci of air in the inferior  LEFT pelvis is concerning for an abscess or possibly an infected  hematoma.     Assessment: 84-year-old female presenting with sepsis secondary to pneumonia found to have new bilateral pulmonary emboli along with multiple DVTs.  She now also has chronic distention and a new pelvic mass of unclear origin.  There is concern for occult malignancy given her presentation and bilateral pulmonary emboli.  Unfortunately her dementia did not allow us to have any meaningful conversation.  It is unclear if she has had a colonoscopy in the past.  There is no history of inflammatory bowel disease.  I doubt that she would tolerate a bowel prep for a colonoscopy.  A Gastrografin enema could also be considered however her current mental state it is unclear if she would tolerate this as well.    Plan:   -Diet per speech  -Portable abdominal x-ray to reassess colonic dilation  -Will need to discuss further evaluation with family when they are available. Could consider gastrografin enema. Doubt she would tolerate a bowel prep  -GI following    I discussed the patient's findings and plan with Dr. Mendez Leavitt who will also independently see and examine the patient.     Immanuel Cabral PA-C  [facility]  Cell:  [phone] Monday through Friday 3658-6911  Office: [phone]    ADDENDUM  Contacted by nurse regarding x-ray showing increase in colonic distention. Recommend  NG decompression. Order placed. Will update surgery as well.  Immanuel Cabral PA-C

## 2019-10-28 NOTE — PROGRESS NOTES
Surgery    Pt lying in bed.  Sitter at bedside.  Pt confused, only orientated to self.   Asking for [employer] and wants to get out of bed to urinate (has purewick in place)  Denies abdominal pain  Large BM yesterday per nursing    B/P: 121/64, T: 98.1, P: 112, R: 20  Abd: soft, non tender, obese.  Drain in place     A/P: Pelvic abscess - s/p IR drain placement, colonic dilation with bowel obstruction vs ileus  - continue current cares  - await culture results on abscess fluid  - await GI recommendations  - continue NPO until seen by GI, then will need to cleared by SLP  - no surgical intervention at this time    Maya Serna PA-C

## 2019-10-29 NOTE — PROGRESS NOTES
"GASTROENTEROLOGY PROGRESS NOTE     SUBJECTIVE: Denies pain, nausea or vomiting     OBJECTIVE:   /62 (BP Location: Right arm)   Pulse 95   Temp 97.1  F (36.2  C) (Oral)   Resp 18   Ht 1.575 m (5' 2\")   Wt 111.2 kg (245 lb 3.2 oz)   SpO2 97%   BMI 44.85 kg/m     Temp (24hrs), Av.4  F (36.3  C), Min:97.1  F (36.2  C), Max:97.8  F (36.6  C)     No data found.     Intake/Output Summary (Last 24 hours) at 10/29/2019 0923  Last data filed at 10/29/2019 0642  Gross per 24 hour   Intake 504 ml   Output 60 ml   Net 444 ml      PHYSICAL EXAM   Constitutional: Elderly, in bed, no acute distress  Cardiovascular: Regular rate and rhythm. No murmurs rubs or gallops  Respiratory: Clear to auscultation bilaterally  Abdomen: Soft, non-tender, moderately distended, hypoactive bowel sounds. No masses or hepatosplenomegaly appreciated. No guarding or rebound tenderness.    Additional Comments:   ROS, FH, SH: See initial GI consult for details.     I have reviewed the patient's new clinical lab results:   Recent Labs   Lab Test 10/29/19  0702 10/28/19  0710 10/27/19  0736  05/08/17 04/10/17 03/27/17   WBC 15.2* 21.1* 28.3*   < >  --   --   --    HGB 12.1 13.3 13.7   < >  --   --   --    MCV 93 92 91   < >  --   --   --     219 213   < >  --   --   --    INR  --   --   --   --  3.0 2.5 2.5    < > = values in this interval not displayed.      Recent Labs   Lab Test 10/29/19  0702 10/28/19  0710 10/27/19  0736   * 150* 147*   POTASSIUM 3.5 3.7 3.8   CHLORIDE 125* 121* 121*   CO2 21 24 21   BUN 19 23 27   CR 0.95 1.06* 0.75   ANIONGAP 7 5 5   FLAVIA 8.6 9.0 8.9      Recent Labs   Lab Test 10/27/19  0736 10/22/19  1223 10/20/19  1433 18  1200  13  1630  13  0530   ALBUMIN 2.2* 2.0*  --  3.8   < >  --    < >  --    BILITOTAL 1.0 0.6  --  0.5   < >  --    < >  --    ALT 50 89*  --  26   < >  --    < >  --    AST 23 109*  --  22   < >  --    < >  --    ALKPHOS 88 101  --  65   < >  --    < >  --  "   PROTEIN  --   --  100*  --   --  10*  --  Negative    < > = values in this interval not displayed.      10/24/19 CT Chest  IMPRESSION:  1. Bilateral pulmonary emboli, predominantly in subsegmental branches.  Given CT suggestion of right heart strain with RV/LV ratio of 1.3 and  reflux in the hepatic veins, would recommend echocardiogram. Also  recommend anticoagulation if clinically appropriate.  2. Small bilateral pleural effusions.  3. Moderate hiatal hernia.        10/26/19 CT abdomen pelvis  IMPRESSION:  1. Bibasilar atelectasis.  2. Dilated ascending and transverse colon without definite cause for  obstruction demonstrated. There could be a distal transverse colon  stricture or dilated colon may be related to ileus. Small bowel loops  are mildly dilated and fluid-filled.  3. Mottled low-density collection with foci of air in the inferior  LEFT pelvis is concerning for an abscess or possibly an infected  hematoma.     10/28/19 AXR  IMPRESSION: Marked increase in colonic distention since the comparison  study, severe distention of the cecum noted. There is no cecal  volvulus on a CT from a procedural scan one day prior. Differential  includes severe colonic ileus versus colonic obstruction.     Assessment: 84-year-old female presenting with sepsis secondary to pneumonia found to have new bilateral pulmonary emboli along with multiple DVTs.  She now also has chronic distention and a new pelvic mass of unclear origin.  There is concern for occult malignancy given her presentation and bilateral pulmonary emboli.  Abdominal x-ray yesterday showed severe distention of the right colon. NG placed with minimal output and abdomen is more distended today. Discussed with surgery re decompression vs GGE. Sigmoidoscopy would be higher risk with the amount of water and air to get to the right colon. Will proceed with GGE.     Plan:   -GGE today  -Continue NG to suction  -May consider colonoscopic evaluation depending on the  results  -Continue supportive care and pain control  -GI following. Discussed with Dr Leavitt, GI staff.     Immanuel Cabral PA-C  [facility]  Cell:  [phone] Monday through Friday [id number]  Office: [phone]

## 2019-10-29 NOTE — PROGRESS NOTES
[facility] staff note    Patient evaluated this afternoon after gastrograffin enema. Some possible improvement, abdomen still distended, but softer, non-tender on palpation. No reported abdominal pain.    No obstruction on gastrograffin enema and no volvulus. Question ileus, given small bowel changes on initial CT or possible acute intestinal pseudo-obstruction in DDx. Patient has seizure and cardiac history, would not be a candidate for neostigmine.    Will order AXR tomorrow AM, if improving than hold on further work-up. If no improvement, would repeat non-contrast CT, if appearance is consistent with intestinal pseudo-obstruction and cecum still dilated > 12cm, then will discuss with patient's family, if willing to pursue colonoscopic decompression, can have a perforation risk of 3% in this setting.     Try to correct electrolyte abnormalities, if possible.     Mendez Leavitt MD   [facility]  Cell [phone]  After 5 PM, please call [phone]

## 2019-10-29 NOTE — PROGRESS NOTES
MD Notification    Notified Person: MD    Notified Person Name: Marianna (GI)    Notification Date/Time: 10/29 1419    Notification Interaction: Telephone w/ readback    Purpose of Notification: XR colon is neg for bowel obst. Can we removed NG?     Orders Received: Clamp patient. If patient is having no pain, you can remove NJ later today.     Comments: NJ clamped at 1430

## 2019-10-29 NOTE — PROGRESS NOTES
Nursing note  Order to place a PICC on Mrs. Dory Patricio F. 84 y.o female, reading MD's note and pt history found out pt has bilateral PE/DVT in bilateral CFV,femoral veins, left popliteal,biateral posterior tibial veins and IVC filter was placed on 10/25/0219. The writer talked to MD(Alexys) regarding all these blood clots cus one of the risk for PICC is blood clot. After having conversations with Dr. Reardon, he told me to go ahead and place the PICC.

## 2019-10-29 NOTE — PROGRESS NOTES
[facility]    Medicine Progress Note - Hospitalist Service       Date of Admission:  10/20/2019  Assessment & Plan   Sintia Connors is a 84 year old female with PMH significant for dementia with paranoia, HTN. Seizure disorder and CVA who was admitted for severe sepsis, pneumonia with SARA. She was subsequently noted to have DVT/PE and also issues with difficulty to control A.fib with RVR. She was subsequently found to have a left pelvic abscess and possible bowel obstruction.    Bilateral subsegmental PEs  Bilateral DVT  CT chest obtained on 10/24 due to ongoing tachycardia, O2 requirement and persistently elevated WBC despite treatment of presumed pneumonia. This revealed bilateral pulmonary emboli predominantly in the subsegmental branches.  Neurology (NCC) consulted regarding initiation of anticoagulation in the setting of amyloid angiopathy and prior hx of intracranial bleed in the setting of anticoagulation. Neuro reviewed prior MRIs and did not recommend anticoagulation due extent of amyloid angiopathy on imaging and increased risk of intracranial bleed.  LE US on 10/24 also showed DVT in bilateral CFV, femoral veins, left popliteal, bilateral posterior tibial veins.  Due to extensive dvt and risk of fatal thromboembolic event and inability to anticoagulate, IVC filter was discussed with Niece (Cassandra) which she was in agreement. TTE (10/21 EF 60-65%, RV mod to severely dilated mildly decreased RV systolic function (prior echo 2015, poorly visualized RV but possible mild dilation)  - IVC filter placed by IR on 10/25/19.  - NO ANTICOAGULATION due to cerebral angiopathy and risk of intracranial hemorrhage, see neurology notes/recommendations, appreciate their assistance.  - Continue to monitor and wean oxygen as able.     Presumed LLL community acquired pneumonia  She was recently seen in the [place] ER, started on oral doxycycline for left lower lobe pneumonia. Per review [facility] notes patient  has issues with medication complience. CXR on admission with LLL infiltrate. WBC 31.6. LA 2.3. She was started on Levaquin on admission which is continued. Suspect SIRS is likely due to extensive DVT and PE rather than sepsis from pneumonia. F/u XRAy 2 days after admission without clear airspace disease.  CT of the chest on 10/24 does show atelectasis, but no clear pneumonia.   - Completed course of antibiotics for this today. Will be continued on antibiotics for newly diagnosed abscess as noted below.     Bowel obstruction vs ileus  - CT scan abdomen/pelvis on 10/26/19 showed dilated ascending and transverse colon due to possible stricture vs ileus.  - NPO.  - IV fluids.  - As needed pain and nausea medications.  - General surgery and GI gollowing.  - NG tube in place.  - Gastrograffin enema ordered for today per GI.  - Will need to consider TPN in the near future if bowel obstruction/ileus does not improve.    Pelvic abscess  Abdominal distention noted previously. Abdominal x-ray on 10/22/19 showed normal gas pattern.  - CT scan abdomen/pelvis showed possible bowel obstruction and pelvic abscess.  - Started on empiric vancomycin and zosyn on 10/26/19, continue the same for now.  - IR consulted, had drain placed on 10/27/19.  - Culture of abscess fluid is growing Streptococcus constellatus, Streptococcus intermedius, and gram negative rods.  - Unclear why she has developed a pelvic abscess.    Sepsis  Patient presented with marked leukocytosis (31), tachycardia (Afib/RVR), mildly increased lactic acid. Initially felt to be secondary to pneumonia, however she was subsequently diagnosed with bilateral pulmonary emboli which was then felt to be the source. Now, she has been found to have an abscess as noted above.  - Antibiotics as above.  - WBC trending down.  - Blood cultures negative to date.  - Treat underlying issues as noted.     Acute Hypoxic Respiratory Failure   Likely secondary to PE. Currently using 2 lpm  by NC.  - Wean supplemental oxygen as able.     Acute on chronic HFpEF  She was up 3-4L in the setting of volume resuscitation from Rhabdomyolysis. Cr normalized. CK < 1000. CXR with mild edema on 10/23.  IVF stopped and received Lasix 20mg IV on 10/23 with decent urine output.    - O2 requirements improved slightly today.  - Chest x-ray today shows no acute cardiopulmonary disease.  - Wean O2 as able.  - Receiving IV fluids now in setting of sepsis. Will discontinue when able, may need diuresis again in the next few days.     Atrial fibrillation with RVR  Has a history of atrial fibrillation/flutter in the past. Went into atrial fibrillation with RVR while in the ER. PTA regimen includes Metoprolol 37.5. Not on anticoagulation due to history of hemorraghic CVA and questionable amyloid angiopathy. Suspect this is driven by PE. HR has been difficult to control and currently ranging 70s-100s today.   - Continue IV metoprolol while NPO. (had been titrated up to metoprolol  mg PO BID prior to becoming NPO).  - Continue digoxin 125mcg daily, switched to IV while NPO.  - Appreciate Cardiology input, they have since signed off, 10/23.  - Continue PTA ASA when able to tolerate oral intake.     Hypokalemia  - Resolved with replacement.    Hyponatremia  Hyperchloremia  - Both trending up this morning.  - Increase IV D5.  - Recheck labs this afternoon.    Acute kidney injury, resolved: Baseline Cr 0.6-0.8  - Creatinine 2.89 on admission.  Likely pre-renal secondary to sepsis and Rhabdo.  - IVF stopped 10/22.  - Creatinine improved to 0.91 this morning.  - Recheck in AM.      Rhabdomyolysis.  CK level 3693  U /litre. Was found on the floor in her kitchen, unclear exactly how long she was on the floor UA with large blood but only 5 RBCs.  - Resolved with IV fluids.     Vascular dementia with behavioral disturbances  Paranoia  Depression with psychotic features  Amyloid angiopathy  - Hold PTA Seroquel for now while NPO.  -  Will start scheduled zyprexa ODT 5 mg tablet nightly while NPO.  - PRN dose of zyprexa ODT also available.     Seizures  - Continue PTA Keppra, switched to IV for now while NPO.     History of Hemorraghic CVA 2017  - Occurred in setting of supra-therapeutic INR.     Hypothyroidism  - Hold PTA levothyroxine for now while NPO.     GERD  - Continue IV PPI for now.     Goals of care  - Discussed with her son again today in the hospital. Continue current cares for now.   - Son changed code status to DNR/DNI on 10/26/19.     Diet: Room Service  NPO for Medical/Clinical Reasons Except for: Meds    DVT Prophylaxis: Pneumatic Compression Devices  Sheets Catheter: not present  Code Status: DNR/DNI      Disposition Plan   Expected discharge: 2 - 3 days, recommended to transitional care unit once adequate pain management/ tolerating PO medications, antibiotic plan established, safe disposition plan/ TCU bed available and SIRS/Sepsis treated.  Entered: Salvatore Reardon MD 10/29/2019, 12:33 PM       The patient's care was discussed with the Bedside Nurse, Care Coordinator/, Patient and Patient's Family.    Salvatore Reardon MD  Hospitalist Service  [facility]    ______________________________________________________________________    Interval History   Sintia Connors was seen this afternoon. She had just recently returned from the gastrograffin enema. She did not want to see me today. She told me I was not her doctor and that I could just leave. She did not answer my questions and refused a physical exam.    Data reviewed today: I reviewed all medications, new labs and imaging results over the last 24 hours. I personally reviewed no images or EKG's today.    Physical Exam   Vital Signs: Temp: 97.1  F (36.2  C) Temp src: Oral BP: 130/71 Pulse: 95 Heart Rate: 103 Resp: 18 SpO2: 94 % O2 Device: Nasal cannula Oxygen Delivery: 2 LPM  Weight: 245 lbs 3.2 oz  Physical exam refused by patient  today.    Data   Recent Labs   Lab 10/29/19  0702 10/28/19  0710 10/27/19  0736   WBC 15.2* 21.1* 28.3*   HGB 12.1 13.3 13.7   MCV 93 92 91    219 213   * 150* 147*   POTASSIUM 3.5 3.7 3.8   CHLORIDE 125* 121* 121*   CO2 21 24 21   BUN 19 23 27   CR 0.95 1.06* 0.75   ANIONGAP 7 5 5   FLAVIA 8.6 9.0 8.9   * 112* 139*   ALBUMIN  --   --  2.2*   PROTTOTAL  --   --  5.8*   BILITOTAL  --   --  1.0   ALKPHOS  --   --  88   ALT  --   --  50   AST  --   --  23     Recent Results (from the past 24 hour(s))   XR Colon Water Soluble Therapeutic    Narrative    COLON WATER SOLUBLE  10/29/2019 11:53 AM     HISTORY: Right-sided colonic distention.    COMPARISON: None.    TECHNIQUE: Water soluble contrast was introduced into the colon  through a rectal tube under gravity.      FLUOROSCOPY TIME: 0.7 minutes.    SPOT FILMS: 8    FINDINGS: Contrast flowed easily into the colon. The colon is widely  patent from the rectum to the cecum. Dilated proximal colon again  noted but this is not related to obstruction or volvulus as the  contrast easily reached the cecum. No significant amount of stool.  There was no reflux of contrast into the terminal ileum. There were no  filling defects.      Impression    IMPRESSION: No obstruction demonstrated.    BENNIE NAYAK MD     Medications     D5W 100 mL/hr at 10/29/19 0345       [Held by provider] aspirin  81 mg Oral Daily     digoxin  125 mcg Intravenous Daily     influenza Vac Split High-Dose  0.5 mL Intramuscular Prior to discharge     levETIRAcetam  750 mg Intravenous BID     [Held by provider] levothyroxine  25 mcg Oral QAM AC     metoprolol  2.5 mg Intravenous Q6H     multivitamins w/minerals  15 mL Per Feeding Tube Daily     OLANZapine zydis  5 mg Oral At Bedtime     pantoprazole (PROTONIX) IV  20 mg Intravenous Daily with breakfast     piperacillin-tazobactam  3.375 g Intravenous Q6H     [Held by provider] QUEtiapine  100 mg Oral BID     sodium chloride (PF)  10 mL  Intracatheter Q8H     sodium chloride (PF)  10 mL Intracatheter Q7 Days     sodium chloride (PF)  10 mL Irrigation Q8H     sodium chloride (PF)  3 mL Intracatheter Q8H     vancomycin place price - receiving intermittent dosing  1 each Intravenous See Admin Instructions

## 2019-10-29 NOTE — PROGRESS NOTES
Surgery    Discussed care with RN, no change overnight.  Had NG placed yesterday due to increased colonic distention on Axr.  NG has put out about 400 overnight.   Pt still confused, sitter at bedside.  Denies any abdominal pain.  She is still passing some stool.    B/P: 114/62, T: 97.1, P: 95, R: 18  ABD: distended - increase from yesterday, soft, non tender    A/p: Pelvic abscess, colonic dilation  - continue NG, NPO, drain  - discussed with GI - needs decompression then possible Gastrogaffin enema to evaluate for possible obstruction  - case also discussed with Dr Bruce Serna, HENRIQUE

## 2019-10-29 NOTE — PHARMACY-VANCOMYCIN DOSING SERVICE
Pharmacy Vancomycin Note  Date of Service 2019  Patient's  1935   84 year old, female    Indication: Sepsis  Goal Trough Level: 15-20 mg/L  Day of Therapy: 4  Current Vancomycin regimen:  Intermittent dosing    Current estimated CrCl = Estimated Creatinine Clearance: 53.7 mL/min (based on SCr of 0.95 mg/dL).    Creatinine for last 3 days  10/26/2019:  2:11 PM Creatinine 0.75 mg/dL  10/27/2019:  7:36 AM Creatinine 0.75 mg/dL  10/28/2019:  7:10 AM Creatinine 1.06 mg/dL  10/29/2019:  7:02 AM Creatinine 0.95 mg/dL    Recent Vancomycin Levels (past 3 days)  10/28/2019:  7:10 AM Vancomycin Level 34.3 mg/L;  7:53 PM Vancomycin Level 22.4 mg/L  10/29/2019:  7:02 AM Vancomycin Level 17.1 mg/L    Vancomycin IV Administrations (past 72 hours)                   vancomycin (VANCOCIN) 2,000 mg in sodium chloride 0.9 % 500 mL intermittent infusion (mg) 2,000 mg New Bag 10/27/19 1956     2,000 mg New Bag  0907     2,000 mg New Bag 10/26/19 1956                Nephrotoxins and other renal medications (From now, onward)    Start     Dose/Rate Route Frequency Ordered Stop    10/29/19 1000  vancomycin (VANCOCIN) 2,000 mg in sodium chloride 0.9 % 500 mL intermittent infusion      2,000 mg  over 2 Hours Intravenous ONCE 10/29/19 0846      10/28/19 0816  vancomycin place price - receiving intermittent dosing      1 each Intravenous SEE ADMIN INSTRUCTIONS 10/28/19 0816      10/26/19 1730  piperacillin-tazobactam (ZOSYN) 3.375 g vial to attach to  mL bag     Note to Pharmacy:  Pharmacy can adjust dose based on renal function.    3.375 g  over 30 Minutes Intravenous EVERY 6 HOURS 10/26/19 1720               Contrast Orders - past 72 hours (72h ago, onward)    None          Interpretation of levels and current regimen:  Trough level is  Therapeutic - appropriate for re-dose    Has serum creatinine changed > 50% in last 72 hours: No    Urine output:  unable to determine    Renal Function: Stable    Plan:  1.  Give 20  mg/kg (2000 mg) IV x 1 dose  2.  Pharmacy will check trough levels as appropriate in 24-36 hours.    3. Serum creatinine levels will be ordered daily for the first week of therapy and at least twice weekly for subsequent weeks.      Kalyn Ross RPH        .

## 2019-10-29 NOTE — PLAN OF CARE
Disoriented to time and situation. VSS on 2L. Denies pain, continues to repeat 'help' but when asked what can be helped with pt asks for water/pop. NPO ex meds, completing oral cares for comfort. NG to LIS, no throat pain complaints from pt. Abdomen distended, BS hypoactive. Pt had 2 small loose BM overnight. Incontinent of urine. ANABEL drain site CDI, flushed q8h per orders, milky brown output. PIV infusing D5 @ 100ml/hr with int abx. T/R q2h in bed, skin tears and scabs throughout skin, mostly on hips/back, scab on R hip bleeds at time with repositioning. Coccyx blanchable. IVC filter dressing CDI. TELE afib CVR. Son will be here this morning to discuss plans/goals of care.

## 2019-10-29 NOTE — PROGRESS NOTES
Charge nurse notifed to called PPM to come to room to take picture of patient L forearm. Patient pulling at IV line with IV Vanco infusion. IV infiltrated and arm red. Ice applied at 1315.

## 2019-10-29 NOTE — PLAN OF CARE
Discharge Planner SLP   Patient plan for discharge: Unable to obtain  Current status: Attempted to see patient at b/s for swallow tx session.  RN indicates that patient was recently given sedation as she will be having her PICC line replaced.  Patient is not currently responding at a level that would be safe to provide PO trials due to sedation.  Session currently cancelled due to sedation.  Will follow.     Per chart notes from 10/28/2019 :  Pt remains at high risk for silent aspiration due to dementia/poor awareness of bolus and swallow initiation.     Continued NPO status is the safest option; however, recommend MD/family discussion on POC wishes for NPO vs initiate po intake despite the risk for aspiration.  Patient's baseline swallow function is not known; however note dementia, current pneumonia and hx of pneumonia.  If po initiated, least hazardous option is clear liquid nectar thick liquids by spoon.  Will defer to MD/family wishes.     SLP to continue Tx 10/30 as indicated.  Barriers to return to prior living situation: dysphagia, high risk for aspiration and acuity of medical status  Recommendations for discharge: to be determine per patient/family/physician discussion (notes indicate patient's son arrives tonight) and determined for for nutrition/hydration/overall medical POC.  Rationale for recommendations: to be determine per patient/family/physician discussion (notes indicate patient's son arrives tonight) and determined for for nutrition/hydration/overall medical POC.       Entered by: Scarlett Samson 10/29/2019 1:56 PM

## 2019-10-29 NOTE — PLAN OF CARE
Pt AO to self and place. VSS on 3L, Ax2 w lift. Pt denies any pain; shows no nonverbal signs of pain. Tried to give tylenol; pt chewed first pill and did not receive second pill. Pt agitated at beginning of shift; received IM zyprexa which calmed pt down. Pt is incontinent of B/B. IVC dressing CDI. ANABEL intact and patent. PIV infusing D5. K+ replaced; redraw in the AM. Son coming in tomorrow to discuss discharge plans.

## 2019-10-29 NOTE — PLAN OF CARE
"Patient is A/ox2. VSS except tachycardic at times. Denied pain. Tele Afib CVR. NPO. Oral care provided with nectar thick water. Patient continually stating \"I want strawberries and peaches\" and \"help me.\" Abdomen distended. BS faint. L abdominal ANABEL drain with minimal milky brown output, irrigated per orders. XR of colon showing no obstruction. NJ with 100mL of brown output. NJ clamped at 1430 this afternoon per GI, can remove NJ if patient does not have abdominal pain later this afternoon. Incontinent of bladder. Patient pulling at NJ and L forearm PIV today. L PIV infiltrated while IV vanco infusing due to patient pulling at IV site, see previous note. Ice applied to area and marked with black pen. Patient currently getting PICC line placed by IV team, PRN Zyprexa given prior to starting, consent reviewed with patient son, éPrez in room. Plan for palliative consult tomorrow and possibly starting TPN. Discharge pending. Nursing to continue to monitor.    "

## 2019-10-30 NOTE — PLAN OF CARE
A&O x2, disoriented to time and situation; restless/ fearful first part of the shift and then slept.   VSS except HTN on room air. Assist of 2/lift. NPO. Turn/repo q 2 hrs. Incontinent of bowel/bladder; pure wick in place. ANABEL drain patent, irrigated per orders. Tele a fib RVR. PICC R arm intact; IVF infusing.  Continues on IV abx.  Abdomen distended, non tender; bowel sounds active. Bedside attendant present. GI/Gen surg/Speech following; Palliative consulted. Son (Pérez) will be here in the morning.    Plan for palliative consult today and possibly starting TPN. Discharge pending.

## 2019-10-30 NOTE — PLAN OF CARE
Speech Language Therapy Discharge Summary    Reason for therapy discharge:    Discharged to Comfort care.     Progress towards therapy goal(s). See goals on Care Plan in [facility] electronic health record for goal details.  Goals not met.  Barriers to achieving goals:   limited tolerance for therapy.    Therapy recommendation(s):    No further therapy is recommended.

## 2019-10-30 NOTE — PROGRESS NOTES
Chart reviewed for f/u  Note order for comfort cares  Diet: advanced to full liquids this afternoon  No aggressive nutrition intervention  Po intake as tolerates  Will be available as needed    Charisma Cartwright RD, LD  Clinical Dietitian - [facility]   Pager - [phone]

## 2019-10-30 NOTE — PROGRESS NOTES
[facility]    Medicine Progress Note - Hospitalist Service       Date of Admission:  10/20/2019  Assessment & Plan   Sintia Connors is a 84 year old female with PMH significant for dementia with paranoia, HTN. Seizure disorder and CVA who was admitted for severe sepsis, pneumonia with SARA. She was subsequently noted to have DVT/PE and also issues with difficulty to control A.fib with RVR. She was subsequently found to have a left pelvic abscess and possible bowel obstruction.    Goals of care  - Son previously changed code status from full code to DNR/DNI on 10/26/19.  - Palliative care consulted.  - Son made decision to transition to comfort cares today.  - Orders adjusted to reflect this, appreciate palliative care assistance.  - Will discuss with social work regarding discharge planning.    Bilateral subsegmental PEs  Bilateral DVT  CT chest obtained on 10/24 due to ongoing tachycardia, O2 requirement and persistently elevated WBC despite treatment of presumed pneumonia. This revealed bilateral pulmonary emboli predominantly in the subsegmental branches.  Neurology ([facility]) consulted regarding initiation of anticoagulation in the setting of amyloid angiopathy and prior hx of intracranial bleed in the setting of anticoagulation. Neuro reviewed prior MRIs and did not recommend anticoagulation due extent of amyloid angiopathy on imaging and increased risk of intracranial bleed.  LE US on 10/24 also showed DVT in bilateral CFV, femoral veins, left popliteal, bilateral posterior tibial veins.  Due to extensive dvt and risk of fatal thromboembolic event and inability to anticoagulate, IVC filter was discussed with Niece (Cassandra) which she was in agreement. TTE (10/21 EF 60-65%, RV mod to severely dilated mildly decreased RV systolic function (prior echo 2015, poorly visualized RV but possible mild dilation)  - IVC filter placed by IR on 10/25/19.  - NO ANTICOAGULATION due to cerebral angiopathy and risk of  intracranial hemorrhage, see neurology notes/recommendations, appreciate their assistance.     Presumed LLL community acquired pneumonia  She was recently seen in the [place] ER, started on oral doxycycline for left lower lobe pneumonia. Per review long term notes patient has issues with medication complience. CXR on admission with LLL infiltrate. WBC 31.6. LA 2.3. She was started on Levaquin on admission which is continued. Suspect SIRS is likely due to extensive DVT and PE rather than sepsis from pneumonia. F/u XRAy 2 days after admission without clear airspace disease.  CT of the chest on 10/24 does show atelectasis, but no clear pneumonia.   - Completed course of antibiotics.     Bowel obstruction vs ileus  - CT scan abdomen/pelvis on 10/26/19 showed dilated ascending and transverse colon due to possible stricture vs ileus.  - General surgery and GI gollowing.  - Had gastrograffin enema on 10/29/19 that did not show any obstruction.  - No improvement on abdominal x-ray this morning.  - Transitioning to comfort cares as noted above.    Pelvic abscess  Abdominal distention noted previously. Abdominal x-ray on 10/22/19 showed normal gas pattern.  - CT scan abdomen/pelvis on 10/26/19 showed possible bowel obstruction and pelvic abscess.  - Started on empiric vancomycin and zosyn on 10/26/19.  - IR consulted, had drain placed on 10/27/19.  - Culture of abscess fluid is growing Streptococcus constellatus, Streptococcus intermedius, and gram negative rods.  - Discontinue antibiotics with transition to comfort cares.    Sepsis  Patient presented with marked leukocytosis (31), tachycardia (Afib/RVR), mildly increased lactic acid. Initially felt to be secondary to pneumonia, however she was subsequently diagnosed with bilateral pulmonary emboli which was then felt to be the source. Now, she has been found to have an abscess as noted above.  - Antibiotics discontinued as above.  - WBC trending down overall.  - Blood cultures  negative to date.  - Treat underlying issues as noted.     Acute Hypoxic Respiratory Failure   Likely secondary to PE.  - Improved, weaned off supplemental oxygen.     Acute on chronic HFpEF  She was up 3-4L in the setting of volume resuscitation from Rhabdomyolysis. Cr normalized. CK < 1000. CXR with mild edema on 10/23.  IVF stopped and received Lasix 20mg IV on 10/23 with decent urine output.    - O2 requirements improved today.  - Has been getting IV fluids in setting of sepsis.  - IV fluids discontinued today due to transition to comfort cares.     Atrial fibrillation with RVR  Has a history of atrial fibrillation/flutter in the past. Went into atrial fibrillation with RVR while in the ER. PTA regimen includes Metoprolol 37.5. Not on anticoagulation due to history of hemorraghic CVA and questionable amyloid angiopathy. Suspect this is driven by PE. HR has been difficult to control and currently ranging 70s-100s today.   - IV metoprolol and digoxin discontinued with transition to comfort cares.  - Previously was on metoprolol  mg PO BID and digoxin 125 mcg PO daily.  - Appreciate Cardiology input, they have since signed off, 10/23.  - ASA discontinued with transition to comfort cares.     Hypokalemia  - Resolved with replacement.    Hyponatremia  Hyperchloremia  - Still elevated, but slightly improved.  - Discontinue IV fluids and labs due to transition to comfort cares.    Acute kidney injury, resolved: Baseline Cr 0.6-0.8  - Creatinine 2.89 on admission.  Likely pre-renal secondary to sepsis and Rhabdo.  - IVF stopped 10/22.  - Creatinine improved to 0.88 this morning.      Rhabdomyolysis.  CK level 3693  U /litre. Was found on the floor in her kitchen, unclear exactly how long she was on the floor UA with large blood but only 5 RBCs.  - Resolved with IV fluids.     Vascular dementia with behavioral disturbances  Paranoia  Depression with psychotic features  Amyloid angiopathy  - Held PTA Seroquel while  NPO. Transitioned to Zyprexa ODT nightly, will continue the same for now.  - PRN dose of zyprexa ODT also available.     Seizures  - Continue PTA Keppra to prevent seizures.     History of Hemorraghic CVA 2017  - Occurred in setting of supra-therapeutic INR.     Hypothyroidism  - Discontinue levothyroxine with transition to comfort cares.     GERD  - Discontinue PPI with transition to comfort cares.       Diet: Room Service  Full Liquid Diet    DVT Prophylaxis: none, comfort cares  Sheets Catheter: not present  Code Status: DNR/DNI      Disposition Plan   Expected discharge: when hospice bed available .   Entered: Salvatore Reardon MD 10/30/2019, 2:49 PM       The patient's care was discussed with the Bedside Nurse, Patient and Patient's Family.    Salvatore Reardon MD  Hospitalist Service  [facility]    ______________________________________________________________________    Interval History   Sintia Connors was seen earlier today. She was in a good mood when I saw her. Was quite confused and conversation was tangential. Denies fevers, chest pain, shortness of breath, nausea, abdominal pain.    Data reviewed today: I reviewed all medications, new labs and imaging results over the last 24 hours. I personally reviewed the abdominal x-ray image(s) showing no improvement in dilated colon.    Physical Exam   Vital Signs: Temp: 97.2  F (36.2  C) Temp src: Axillary BP: (!) 144/64 Pulse: (!) 18 Heart Rate: 95 Resp: 18 SpO2: 96 % O2 Device: None (Room air) Oxygen Delivery: 2 LPM  Weight: 245 lbs 3.2 oz  Constitutional: awake, alert, cooperative, no apparent distress, laying in bed  Respiratory: clear to auscultation bilaterally, no crackles or wheezing  Cardiovascular: regular rate and rhythm, normal S1 and S2, and no murmur noted  GI: bowel sounds positive, soft, distended, non-tender  Skin: warm, dry  Neurologic: awake, alert, not oriented, pleasant    Data   Recent Labs   Lab 10/30/19  0667  10/29/19  1527 10/29/19  0702 10/28/19  0710 10/27/19  0736   WBC 15.4*  --  15.2* 21.1* 28.3*   HGB 12.4  --  12.1 13.3 13.7   MCV 93  --  93 92 91     --  201 219 213   * 154* 153* 150* 147*   POTASSIUM 3.1* 3.5 3.5 3.7 3.8   CHLORIDE 125* 126* 125* 121* 121*   CO2 21 24 21 24 21   BUN 15 17 19 23 27   CR 0.88 0.92 0.95 1.06* 0.75   ANIONGAP 6 4 7 5 5   FLAVIA 8.4* 8.5 8.6 9.0 8.9   * 106* 142* 112* 139*   ALBUMIN 2.1*  --   --   --  2.2*   PROTTOTAL 5.3*  --   --   --  5.8*   BILITOTAL 0.9  --   --   --  1.0   ALKPHOS 75  --   --   --  88   ALT 37  --   --   --  50   AST 24  --   --   --  23     Recent Results (from the past 24 hour(s))   XR Chest Port 1 View    Narrative    OBLIQUE FRONTAL CHEST  10/29/2019 3:43 PM     HISTORY: PICC tip verification.    COMPARISON: None.      Impression    IMPRESSION: The PICC line is difficult to see, the tip is likely in  the central atrium consider retracting 4 cm. Nasogastric tube tip in  left upper quadrant in the expected location of stomach.    BENNIE NAYAK MD   XR Chest Port 1 View    Narrative    CHEST ONE VIEW PORTABLE   10/29/2019 5:20 PM     HISTORY:  PICC placement.    COMPARISON: Chest radiograph performed earlier today at 3:34 PM.      Impression    IMPRESSION: Right PICC is in place, with tip in the low SVC. Enteric  tube has been removed. No other significant interval change.    STEPHANIA MCPHERSON MD   XR Abdomen 1 View    Narrative    ABDOMEN ONE VIEW  10/30/2019 10:25 AM     HISTORY: Evaluate for improvement in colonic dilation.    COMPARISON: 10/28/2019      Impression    IMPRESSION: Dilated colon is not significantly changed compared to the  prior study. There is a pigtail drainage catheter projected over the  pelvis. No definite free air. An IVC filter is noted.    VLAD ARGUELLO MD     Medications       levETIRAcetam  750 mg Intravenous BID     OLANZapine zydis  5 mg Oral At Bedtime     sodium chloride (PF)  10 mL Intracatheter Q8H

## 2019-10-30 NOTE — PROGRESS NOTES
"Patient disoriented.  Repeatedly saying \"go away\".  Does not answer questions appropriately.  No reported pain.  No reported nausea.    Afebrile with normal vital signs  Multiple recorded loose stools.  Drain with minimal output  Abdomen obese no appreciable tenderness on examination    Assessment/plan: Pelvic abscess likely related to diverticular disease.  GI note from yesterday afternoon reviewed.  Agree with plans as outlined note.  No overt evidence of true mechanical obstruction.  No plans for surgery at this time.  Await palliative evaluation as well.  We will continue to follow.  "

## 2019-10-30 NOTE — PLAN OF CARE
Pt confused, oriented to self only, labile mood. VSS, pt unable to report pain but appears comfortable. Repositioning and redirecting as needed. Incontinent of urine, PurWick in place, incontinent of loose stool x3 this shift. Abdomen distended, BS absent except fait in LLQ. Abdominal Xray done this AM, abdominal drain in place, flushed as ordered. Palliative RN met with son, decision made to transition pt to hospice/comfort care.  called at pt's son's request.

## 2019-10-30 NOTE — PROGRESS NOTES
"SPIRITUAL HEALTH SERVICES Progress Note  [facility]    Received page asking for ASAP visit from Fr. Nolasco for pt due to end of life concerns.  Consult came in from family.   contacted Fr. Nolasco, who stated he would be at hospital \"in about an hour.\"  SH went to notify family, but only pt was present.  Pt did not present as alert/oriented to situation.   inquired w/  if family was around, but was told that nurse would know, but shift was switching over and could not be contacted at the moment.  Fr. Nolasco will be available soon for f/u.  SH will f/u as needed.      Tristian Abarca  Chaplain Resident    "

## 2019-10-30 NOTE — PROGRESS NOTES
"GASTROENTEROLOGY PROGRESS NOTE     SUBJECTIVE: Pt will not talk to me. Flips me the bird. Shakes head \"no\" when asked if I could examine her abdomen.      OBJECTIVE:   BP (!) 154/63 (BP Location: Left arm)   Pulse 95   Temp 97.7  F (36.5  C) (Oral)   Resp 18   Ht 1.575 m (5' 2\")   Wt 111.2 kg (245 lb 3.2 oz)   SpO2 92%   BMI 44.85 kg/m     Temp (24hrs), Av.7  F (35.9  C), Min:95.3  F (35.2  C), Max:97.7  F (36.5  C)     No data found.     Intake/Output Summary (Last 24 hours) at 10/30/2019 0939  Last data filed at 10/30/2019 0900  Gross per 24 hour   Intake 1355 ml   Output 735 ml   Net 620 ml      PHYSICAL EXAM   Constitutional: Age appropriate, in bed, no acute distress  No other examination allowed    Additional Comments:   ROS, FH, SH: See initial GI consult for details.     I have reviewed the patient's new clinical lab results:   Recent Labs   Lab Test 10/30/19  0625 10/29/19  0702 10/28/19  0710  05/08/17 04/10/17 03/27/17   WBC 15.4* 15.2* 21.1*   < >  --   --   --    HGB 12.4 12.1 13.3   < >  --   --   --    MCV 93 93 92   < >  --   --   --     201 219   < >  --   --   --    INR  --   --   --   --  3.0 2.5 2.5    < > = values in this interval not displayed.      Recent Labs   Lab Test 10/30/19  0625 10/29/19  1527 10/29/19  0702   * 154* 153*   POTASSIUM 3.1* 3.5 3.5   CHLORIDE 125* 126* 125*   CO2 21 24 21   BUN 15  19   CR 0.88 0.92 0.95   ANIONGAP 6 4 7   FLAVIA 8.4* 8.5 8.6      Recent Labs   Lab Test 10/30/19  0625 10/27/19  0736 10/22/19  1223 10/20/19  1433  13  1630  13  0530   ALBUMIN 2.1* 2.2* 2.0*  --    < >  --    < >  --    BILITOTAL 0.9 1.0 0.6  --    < >  --    < >  --    ALT 37 50 89*  --    < >  --    < >  --    AST 24 23 109*  --    < >  --    < >  --    ALKPHOS 75 88 101  --    < >  --    < >  --    PROTEIN  --   --   --  100*  --  10*  --  Negative    < > = values in this interval not displayed.      10/24/19 CT Chest  IMPRESSION:  1. Bilateral " pulmonary emboli, predominantly in subsegmental branches.  Given CT suggestion of right heart strain with RV/LV ratio of 1.3 and  reflux in the hepatic veins, would recommend echocardiogram. Also  recommend anticoagulation if clinically appropriate.  2. Small bilateral pleural effusions.  3. Moderate hiatal hernia.        10/26/19 CT abdomen pelvis  IMPRESSION:  1. Bibasilar atelectasis.  2. Dilated ascending and transverse colon without definite cause for  obstruction demonstrated. There could be a distal transverse colon  stricture or dilated colon may be related to ileus. Small bowel loops  are mildly dilated and fluid-filled.  3. Mottled low-density collection with foci of air in the inferior  LEFT pelvis is concerning for an abscess or possibly an infected  hematoma.      10/28/19 AXR  IMPRESSION: Marked increase in colonic distention since the comparison  study, severe distention of the cecum noted. There is no cecal  volvulus on a CT from a procedural scan one day prior. Differential  includes severe colonic ileus versus colonic obstruction.    10/29/19 GGE  FINDINGS: Contrast flowed easily into the colon. The colon is widely  patent from the rectum to the cecum. Dilated proximal colon again  noted but this is not related to obstruction or volvulus as the  contrast easily reached the cecum. No significant amount of stool.  There was no reflux of contrast into the terminal ileum. There were no  filling defects.     Assessment: 84-year-old female presenting with sepsis secondary to pneumonia found to have new bilateral pulmonary emboli along with multiple DVTs.  She now also has chronic distention and a new pelvic mass of unclear origin.  There is concern for occult malignancy given her presentation and bilateral pulmonary emboli.  Abdominal x-ray showed severe distention of the right colon. NG placed with minimal output. GGE performed yesterday showing colonic dilation but no obstruction or volvulus. Pt more  confused today but afebrile with stable vitals    Plan:   -Agree with palliative care consultation  -Would suggest CT abdomen/pelvis with contrast if there is concern for diverticular related pelvic abscess.  -No plans for colonoscopy at this time  -Continue supportive care and pain control  -GI following. Discussed with Dr Leavitt, GI staff.     Immanuel Cabral PA-C  [facility]  Cell:  [phone] Monday through Friday 0837-2725  Office: [phone]

## 2019-10-30 NOTE — PLAN OF CARE
Pt AOx2, VSS on RA ex tachy at times, Ax2 w/ lift. T/ R q2. Pleasant this shift. Pt denied pain throughout shift; no nonverbal s/s of pain. Pt NPO; tolerating nectar thick orange juice w/ mouth swabs. Pt wants ice cream and oranges. ANABEL intact and draining; thin milky brown output; irrigated per orders. NG removed this shift; tolerated. PICC intact and infusing; dressing CDI. LUE infiltration improved significantly; minimal redness or irritation. Incontinent of stool x2. Pure wick placed. Plan for palliative to consult tomorrow; Son Pérez will be here in AM. possibly starting TPN. Discharge pending tomorrows discussion.

## 2019-10-30 NOTE — CONSULTS
[facility]  Palliative Care Consultation Note    Patient: Sintia Connors  Date of Admission:  10/20/2019    Requesting Clinician / Team: Dr. Reardon/Hospitalist  Reason for consult: Goals of care, Patient and family support    Recommendations:    Transition to comfort measures only; stop checking VS, labs, telemetry    Discontinue IV zosyn and discontinue abdominal drain    I discontinued nonessential medications that are not directly contributing to her comfort    I would recommend continuing keppra; change to PO when able/upon discharge     Continue zyprexa 5mg ODT at bedtime, with 5mg doses available Q8hrs PRN breakthrough agitation     Dilaudid 1-2mg PO every 2hrs PRN pain, SOB. Dilaudid 0.3-0.5mg IV Q1hr PRN if sx not relieved by oral     Atropine, robinul PRN secretions     Full liquid diet for pleasure and comfort, son aware of risk of aspiration leading to imminent dying process     SW consult for hospice, exploring options of long term care vs [facility] (pt has strong Shinto rosa) in [place]. Hospice agency to be determined      Will ask Father Constance to see pt for spiritual support, per family request    These recommendations have been discussed with bedside RN Karla, unit SW John, and Dr. Reardon.    Donna URIBE, [facility]  Palliative Medicine   Pager [phone]      Thank you for the opportunity to participate in the care of this patient and family. Our team: will continue to follow.     During regular M-F work hours -- if you are not sure who specifically to contact -- please contact us at [phone].    After regular work hours and on weekends/holidays, you can call our answering service at [phone]. Also, who's on call for us is available in [employer].     Attestation:  Total time on the floor involved in the patient's care: 70 minutes  Total time spent in counseling/care coordination: >50%    Assessments:  Sintia Connors is a 84 year old female with vascular  "dementia with behavioral disturbances, depression with psychotic features, anxiety, HTN, HFpEF, seizure disorder, CVA, hypothyroidism, osteoporosis and GERD who presents with sustained fall and found down in her assisted living apartment. Her hospitalization has been complicated by rhabdomyolysis and SARA now resolved, sepsis 2/2 PNA now resolved, a.fib with RVR, acute respiratory failure 2/2 new bilateral subsegmental PEs and bilateral DVTs unable to anticoagulate due to underlying amyloid angiopathy now s/p IVC filter placement, ileus vs SBO for which GI has been following (decompressive NG has since been removed), pelvic abscess likely 2/2 diverticular disease, dysphagia and inability to tolerate PO diet given risk of aspiration, malnutrition, and ongoing behavioral disturbances leading to noncompliance and lack of cooperation with cares.     Today, the patient was seen for:  Goals of care and pt and family support     Visited pt earlier this AM. She was just returning from the chair to bed with use of the sling. She is repeatedly saying, \"help me.\" She appears in distress. Nursing staff lovingly providing support.    When prompted with questions, she was notably unpleasantly uncooperative, stating, \"you shut your mouth.\"     Visited with son/primary surrogate decision maker, Pérez, in private. Together we reviewed Sintia's health prior to admission (started to decline about 6 years ago with behavioral changes, diagnosed with vascular dementia, physically declined after having her knees replaced and developing morbid obesity, in assisted living for the last 15 months, not taking medications consistently, fell 3x prior to admission, assisted living no longer able to care for her any longer, behavioral outbursts, poor quality of life).     We spent a great deal of time detailing her multiple complex medical issues laid out above. It appears that our biggest question is how to adequately give Sintia nutrition, given the " "somewhat resolving ileus, recent abdominal abscess which appears to be recovering, and dysphagia (likely due to her advanced dementia, illness in general, weakness). We review the options of a NG for temporary nutrition to see if she can gradually improve vs PEG for long term nutrition. We briefly discussed TPN as a suboptimal option if her gut continues to be nonfunctional.    Within this discussion, we reviewed her health care directive from 2013 in great detail. This was written prior to her decline. She very clearly states that if she is at a point with severe dementia without any chance of meaningful recovery, she would not want life sustaining measures. Given her decline detailed above over the last 6 years, and certainly where this hospitalization has landed her (immobile/bed/abigail/lift bound, total care, incontinent, dysphagia/malnutrition, and generalized lack of cooperation), I express worry that we have likely reached a point where she is unlikely to cooperate with rehabilitation to get her back to any semblance of independence or function again. It appears then that she would not want us to prolong this state of health.    After much discussion, we both agree that putting her through nutrition support is unlikely to be helpful, nor would it be something she would probably want for herself. We thus review the option of eating and drinking by mouth for pleasure and comfort (she is asking for pepsi and ice cream), accepting the potential risk for aspiration, which could ultimately lead to breathing irritation or difficulty, PNA, breathing failure, or death.    We review the option of hospice. I educated family regarding hospice philosophy and prognostic criteria. Dispelled common myths. Discussed what hospice is (and is not), what services are usually provided (and those that are not, ex \"care home care\"), under what circumstances people tend to enroll, and the variety of places people can get hospice care " (along with subsequent financial implications). [facility] was discussed as an option. Returning to her assisted living facility is unlikely to be the best fit, given her total care needs. Discussed typical anticipated timing of discharge. Based on this discussion, Pérez believes this is the best course of action, as do I.    We review the option of starting comfort cares in the hospital. We will allow PO intake accepting risk for aspiration. We will step away from the abx and abdominal drain. He confirms DNR/DNI. Further orders/details as above.     Prognosis, Goals, & Planning:      Functional Status just prior to hospitalization: 3 (Capable of only limited self-care; needs help with ADLs; in bed/chair >50% of waking hours)      Prognosis, Goals, and/or Advance Care Planning were addressed today: Yes      Patient's decision making preferences: unable to assess          Patient has decision-making capacity today for complex decisions: No            I have concerns about the patient/family's health literacy today: No           Patient has a completed Health Care Directive: Yes, and on file.      Code status: DNR/DNI    Coping, Meaning, & Spirituality:   Mood, coping, and/or meaning in the context of serious illness were addressed today: Yes  Summary/Comments: Pt's Hoahaoism rosa is very important to her, thus placement at [facility]. We will have Father Constance come to see her today.    Significant conversation around the challenges of being around pt and her behavioral disturbances. Pt can be quite mean to her family and her medical providers. She does not like to be pushed, and does not like the feeling of lack of control. Her dementia has taken over who she is, and we acknowledge that the mean things she has said can be difficult to depersonalize when it is your mother, yet that what she is sying is not who she is as a person. Pérez admits to being very overwhelmed and exhausted in caring for her, and  admittedly has faced times when he has not wanted to deal with what is actually happening. He doesn't get great support from his siblings, either. We acknowledge the complex grief that can sometimes come from this, and the support he can access moving forward.     Social:     Living situation: [facility] living [facility]     Key family / caregivers: Pt is . 3 adult children. Daughter is largely estranged, but in connection with her brothers. Son Pérez here from [place] and helping out. His brother Samuel is somewhat involved, but not to the extent as Pérez    History of Present Illness:  History gathered today from: family/loved ones, medical chart, medical team members, unit team members    Sintia Connors is a 84 year old female with vascular dementia with behavioral disturbances, depression with psychotic features, anxiety, HTN, HFpEF, seizure disorder, CVA, hypothyroidism, osteoporosis and GERD who presents with sustained fall and found down in her assisted living apartment. Her hospitalization has been complicated by rhabdomyolysis and SARA now resolved, sepsis 2/2 PNA now resolved, a.fib with RVR, acute respiratory failure 2/2 new bilateral subsegmental PEs and bilateral DVTs unable to anticoagulate due to underlying amyloid angiopathy now s/p IVC filter placement, ileus vs SBO for which GI has been following (decompressive NG has since been removed), pelvic abscess likely 2/2 diverticular disease, dysphagia and inability to tolerate PO diet given risk of aspiration, malnutrition, and ongoing behavioral disturbances leading to noncompliance and lack of cooperation with cares.     Key Palliative Symptom Data:  We are not helping to manage these symptoms currently in this patient.    Patient is on opioids: bowels not assessed today.    ROS:  Comprehensive ROS is reviewed and is negative except as here & per HPI: N/A     Past Medical History:  Past Medical History:   Diagnosis Date     Atrial flutter (H)  4/12/15    post op ANW     Cerebral infarction (H)      GERD (gastroesophageal reflux disease)      PVC (premature ventricular contraction)      Rheumatoid arthritis (H)      S/P lumpectomy of breast     benign      Seizures (H)      Senile osteoporosis         Past Surgical History:  Past Surgical History:   Procedure Laterality Date     APPENDECTOMY       Athroplasty right knee  4/2015     C TOTAL KNEE ARTHROPLASTY Left 09/2015     HERNIA REPAIR      hiatial     HERNIA REPAIR      inguial hernia repair     HYSTERECTOMY TOTAL ABDOMINAL, BILATERAL SALPINGO-OOPHORECTOMY, COMBINED       IR IVC FILTER PLACEMENT  10/25/2019         Family History:  Family History   Problem Relation Age of Onset     Heart Disease No family hx of      Arrhythmia No family hx of         Allergies:  Allergies   Allergen Reactions     Lorazepam Visual Disturbance     Severe hallucinations;        Medications:  I have reviewed this patient's medication profile and medications from this hospitalization.   Noted scheduled meds are:  Keppra 750mg IV BID  Zyprexa 5mg ODT at bedtime    Noted PRN meds are:  Zyprexa 5mg ODT Q8hrs PRN breakthrough agitation   Dilaudid 1-2mg PO every 2hrs PRN pain, SOB. Dilaudid 0.3-0.5mg IV Q1hr PRN if sx not relieved by oral   Atropine, robinul PRN secretions     Physical Exam:  Vital Signs: Temp: 97.2  F (36.2  C) Temp src: Axillary BP: (!) 144/64 Pulse: (!) 18 Heart Rate: 95 Resp: 18 SpO2: 96 % O2 Device: None (Room air) Oxygen Delivery: 2 LPM  Weight: 245 lbs 3.2 oz  CONSTITUTIONAL: Chronically ill morbidly obese woman seen returning to bed from ing, alert, notably distressed and crying out for help, uncooperative with assessment   HEENT: NCAT  RESPIRATORY: NL respiratory effort on RA  : Purewick     Data reviewed:  Recent imaging reviewed, my comments on pertinents:   10/30 Dilated colon not significantly changed from prior studies (10/29 imaging shows no obstruction)  10/28 CT guided drain into pelvic fluid  collection, 150ml brown-colored feculent material returned   10/25 IVC filter placement   10/24 Bilateral DVTs  10/24 CT Chest with bilateral PEs    Recent lab data reviewed, my comments on pertinents:   Na 152  Creat 0.88  Albumin 2.1  WBC 15.4 down from 21.1

## 2019-10-30 NOTE — PLAN OF CARE
PT: RN reports that pt will be transitioning to comfort care. Pt will be discharged from PT.    Physical Therapy Discharge Summary    Reason for therapy discharge:    Discharged to Pt transitioning to comfort care     Progress towards therapy goal(s). See goals on Care Plan in [facility] electronic health record for goal details.  Goals not met.  Barriers to achieving goals:   limited tolerance for therapy.    Therapy recommendation(s):    No further therapy is recommended.

## 2019-10-31 NOTE — PROGRESS NOTES
[facility]    Internal Medicine Hospitalist Progress Note  10/31/2019  I evaluated patient on the above date.    Dirk Barillas Jr., MD  [phone] (p)  Text Page        Assessment & Plan New actions/orders today (10/31/2019) are underlined.    Sintia Connors is a 84 year old female with PMH significant for obesity; HTN; afib/flutter; amyloid angiopathy; h/o hemorrhagic CVA; seizure disorder; and dementia with paranoia; who was brought to [facility] 10/20/2019 after being found down. Iinitial workup revealed signs severe sepsis, SARA, rhabdomyolysis and possible pneumonia.    ASSESSMENT:  1. Bilateral subsegmental PEs with acute hypoxic respiratory failure.  2. Bilateral lower extremity DVTs.  3. Right heart failure, suspect due to above.  CT chest obtained on 10/24 due to ongoing tachycardia, O2 requirement and persistently elevated WBC despite treatment of presumed pneumonia; this revealed bilateral pulmonary emboli predominantly in the subsegmental branches. Neurology consulted regarding initiation of anticoagulation in the setting of amyloid angiopathy and prior hx of intracranial bleed in the setting of anticoagulation. Neuro reviewed prior MRIs and did not recommend anticoagulation due extent of amyloid angiopathy on imaging and increased risk of intracranial bleed. LE US on 10/24 showed DVT in bilateral CFV, femoral veins, left popliteal, bilateral posterior tibial veins.  Due to extensive DVT and risk of fatal thromboembolic event and inability to anticoagulate, IVC filter was placed 10/25. Echo previously done this hospitalization 10/21 showed LVEF 60-65%; RV moderate to severely dilated, mildly decreased right ventricular systolic function    4. Pelvic abscess.  5. Bowel obstruction vs ileus.  Abdominal distention noted. Abdominal x-ray on 10/22/19 showed normal gas pattern. CT scan abdomen/pelvis on 10/26/19 showed dilated ascending and transverse colon without definite cause for obstruction  demonstrated, question a distal transverse colon stricture or dilated colon related to ileus, with small bowel loops mildly dilated and fluid-filled; mottled low-density collection with foci of air in the inferior left pelvis concerning for an abscess or possibly an infected hematoma. Started on empiric vancomycin and zosyn on 10/26/19. Surgery consulted. Drain placed by IR on 10/27/19. Culture of abscess fluid growing Streptococcus constellatus, Streptococcus intermedius, Bacteroides ovatus/xylanisolvens, gram negative rods. Abdominal x-ray 10/28 showed marked increase in colonic distension (compared with 10/22), differential included severe colonic ileus vs colonic obstruction. NG placed 10/28 and GI consulted. Antibiotics stopped 10/30 with transition to comfort cares. Had gastrograffin enema on 10/29/19 that did not show any obstruction. AXR 10/30 showed dilated colon, not significantly changed since 10/28.    6. Presumed LLL community acquired pneumonia.  * She was recently seen in the [place] ER, started on oral doxycycline for left lower lobe pneumonia.   * On initial evaluation 10/20, pt tachycardic; WBC 31.6 and lactic acid 2.3; CXR on admission with LLL infiltrate. Started on levofloxacin 10/20. Follow-up CXR 10/22 without clear airspace disease. CT of the chest on 10/24 showed atelectasis, small bilateral pleural effusions, but no clear pneumonia (showed PE as above). Completed levofloxacin 10/26.    7. Sepsis/SIRS, suspect related to bilateral PE, also pelvic absces..  Patient presented 10/20 with signs of sepsis as above. Initially felt to be secondary to pneumonia and treated with antibiotics as above. However she was subsequently diagnosed with bilateral pulmonary emboli which was then felt to be the source. Subsequently, she was found to have a pelvic abscess as noted above.      8. Acute on chronic diastolic CHF exacerbation (HFpEF) due to volume resuscitation.  Echo 10/21 showed LVEF 60-65%; RV  moderate to severely dilated, mildly decreased right ventricular systolic function. She was up 3-4L in the setting of volume resuscitation from rhabdomyolysis. IVF stopped and received Lasix 20mg IV on 10/23 with decent urine output.      9. Rhabdomyolysis.   CK level 6077 on admit 10/20. With fluids, CK down to 518 on 10/23.    10. Atrial fibrillation with RVR, suspect driven by PE.  * Has a history of atrial fibrillation/flutter in the past. Not on anticoagulation due to history of hemorraghic CVA and questionable amyloid angiopathy. PTA on metoprolol.  * Went into atrial fibrillation with RVR while in the ER. Treated with metoprolol and digoxin. Echo 10/21 showed LVEF 60-65%; RV moderate to severely dilated, mildly decreased right ventricular systolic function. Cardiology consulted.     11. Hypernatremia and hyperchloremia, suspect related to dehydration.  Recent Labs   Lab 10/30/19  0625 10/29/19  1527 10/29/19  0702 10/28/19  0710 10/27/19  0736 10/26/19  1411   * 154* 153* 150* 147* 148*     12. Acute kidney injury, resolved.  Creatinine 2.89 on admission. Resolved with IVF's.    Goals of care.  Son  changed code status from full code to DNR/DNI on 10/26/19. Palliative care consulted 10/30. Ultimately, son made decision to transition to comfort cares 10/30.        OTHER CHRONIC ISSUES:  1. Amyloid angiopathy.  2. 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  3. Seizures.  4. 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  5. Depression with psychotic features.  6. HTN.  7. Hypothyroidism.  8. GERD.      PLAN:  - Continue comfort measures.  - Plan discharge to hospice facility.    Diet: Room Service  Full Liquid Diet    Prophylaxis: PCD's, ambulation.   Sheets Catheter: not present  Code Status: DNR/DNI      Disposition Plan   Expected discharge: Tomorrow, recommended to hospice.  Entered: Dirk Barillas MD 10/31/2019, 2:53 PM         Interval History   Pt sleeping, I did  "not wake her.    -Data reviewed today: I reviewed all new labs and imaging over the last 24 hours. I personally reviewed no images or EKG's today.    Physical Exam   Heart Rate: 79, Blood pressure (!) 154/74, pulse (!) 18, temperature 98.2  F (36.8  C), temperature source Axillary, resp. rate 18, height 1.575 m (5' 2\"), weight 111.2 kg (245 lb 3.2 oz), SpO2 92 %, not currently breastfeeding.  Vitals:    10/21/19 0614 10/22/19 0600 10/26/19 0515   Weight: 106.9 kg (235 lb 11.2 oz) 108.2 kg (238 lb 8 oz) 111.2 kg (245 lb 3.2 oz)     Vital Signs with Ranges  Temp:  [98.2  F (36.8  C)] 98.2  F (36.8  C)  Pulse:  [18] 18  Heart Rate:  [79] 79  BP: (154)/(74) 154/74  SpO2:  [92 %] 92 %  Patient Vitals for the past 24 hrs:   BP Temp Temp src Pulse Heart Rate SpO2   10/30/19 1511 (!) 154/74 98.2  F (36.8  C) Axillary (!) 18 79 92 %     I/O's Last 24 hours  I/O last 3 completed shifts:  In: 300 [P.O.:200; I.V.:100]  Out: 430 [Urine:400; Drains:30]    Constitutional: Sleeping. In no acute distress.  Respiratory:   Cardiovascular:   GI:   Skin/Integumen:   Other:        Data   Recent Labs   Lab 10/30/19  0625 10/29/19  1527 10/29/19  0702 10/28/19  0710 10/27/19  0736   WBC 15.4*  --  15.2* 21.1* 28.3*   HGB 12.4  --  12.1 13.3 13.7   MCV 93  --  93 92 91     --  201 219 213   * 154* 153* 150* 147*   POTASSIUM 3.1* 3.5 3.5 3.7 3.8   CHLORIDE 125* 126* 125* 121* 121*   CO2 21 24 21 24 21   BUN 15 17 19 23 27   CR 0.88 0.92 0.95 1.06* 0.75   ANIONGAP 6 4 7 5 5   FLAVIA 8.4* 8.5 8.6 9.0 8.9   * 106* 142* 112* 139*   ALBUMIN 2.1*  --   --   --  2.2*   PROTTOTAL 5.3*  --   --   --  5.8*   BILITOTAL 0.9  --   --   --  1.0   ALKPHOS 75  --   --   --  88   ALT 37  --   --   --  50   AST 24  --   --   --  23     Recent Labs   Lab Test 10/30/19  0625 10/29/19  1527 10/29/19  0702 10/28/19  0710 10/27/19  0736   * 106* 142* 112* 139*         No results found for this or any previous visit (from the past 24 " hour(s)).    Medications   All medications were reviewed.      levETIRAcetam  750 mg Intravenous BID     OLANZapine zydis  5 mg Oral At Bedtime     sodium chloride (PF)  10 mL Intracatheter Q8H

## 2019-10-31 NOTE — PLAN OF CARE
Oriented to self. No complaints of pain. Full liquid diet, fair appetite.  Scratches all over body. Turn and repo q2h. Sitter at bedside. PICC SL. ANABEL drain with thick yellow output. Purewick changed. Continue to keep comfortable.

## 2019-10-31 NOTE — PROGRESS NOTES
RADIOLOGY PROCEDURE NOTE  Patient name: Sintia Connors  MRN: [id number]  : 1935    Pre-procedure diagnosis: Discontinued abscess drain  Post-procedure diagnosis: Same    Procedure Date/Time: 2019  3:04 PM  Procedure: Abdominal abscess drain removal  Estimated blood loss: None  Specimen(s) collected with description: none  The patient tolerated the procedure well with no immediate complications.  Significant findings:none    See imaging dictation for procedural details.    Provider name: HECTOR Ball  Assistant(s):None

## 2019-10-31 NOTE — PROGRESS NOTES
[facility] Chart Check    Chart reviewed, events noted  Family deciding to transition to comfort care which is very reasonable  No further GI recommendations at this time  Please call with any questions, will sign off.    Immanuel Cabral PA-C  [facility]  [phone] Cell ([phone])  [phone] Office (after 1300)

## 2019-10-31 NOTE — CONSULTS
KELLI Consult Note:    Care Transition Initial Assessment - KELLI     Met with: Antonio Ortez  Active Problems:    Sepsis (H)       DATA  Lives With: facility resident   Living Arrangements: assisted living  Quality of Family Relationships: helpful, involved, supportive  Description of Support System: Supportive, Involved  Who is your support system?: Children  Support Assessment: Adequate family and caregiver support, Adequate social supports.   Identified issues/concerns regarding health management:  Patient is an 84 year old female that was admitted to the hospital on 10/20/19 with a primary diagnosis of sepsis.  KELLI met with patient's son Pérez to discuss end of life/hospice care for patient.  Pérez requesting that referral be sent to [facility] for consideration.  SW placed call to [facility] who stated that secondary to cognition and weight restrictions on their equipment, they would not be able to accept patient.  KELLI updated Pérez and he is in agreement with pursing long term care facilities for hospice care.  Requesting that referrals be sent to facilities in the [facility] area.  SW will send referrals to facilities to inquire about bed availability.      Antonio would like to use [facility] for hospice services.  KELIL placed call to Beth with  Hospice and confirmed a meeting time for 9:30 on 11/1.  Updated son Pérez and he is in agreement.  Pérez will update his sister and brother as well regarding meeting time.  Referrals sent to [facility], [facility], [facility], [facility].         Quality of Family Relationships: helpful, involved, supportive     ASSESSMENT  Cognitive Status:  disoriented and confused  Concerns to be addressed: End of life/Hospice.     PLAN  Financial costs for the patient includes: Patient has Medical Assistance.   Patient given options and choices for discharge: Yes  Patient/family is agreeable to the plan?  Yes  Transportation/person  available to transport on day of discharge: [facility] Stretcher.  Patient Goals and Preferences: LTC with Hospice  Patient anticipates discharging to: LTC with Hospice    Addendum:  Patient has been accepted at [facility], [facility] and [facility].  SW discussed with patient's son and he would like to proceed with [facility].  Confirmed bed with [facility] who stated patient would be able to transfer at 2:30.  SW placed call to [facility] to arrange for a stretcher ride at 2:30 on 11/1.  Updated son and he is in agreement with discharge plan.     PAS-RR    D: Per DHS regulation, SW completed and submitted PAS-RR to [place] [employer] Direct [employer] via the Senior LinkAge Line.  PAS-RR confirmation # is : [id number].    I: SW spoke with patient's son and they are aware a PAS-RR has been submitted.  SW reviewed with son that they may be contacted for a follow up appointment within 10 days of hospital discharge if their SNF stay is < 30 days.  Contact information for [facility] LinkAge Line was also provided.    A: Son verbalized understanding.    P: Further questions may be directed to [facility] LinkAge Line at #[phone], option #4 for PAS-RR staff.      VELASQUEZ Willis, LGSW  [phone]  [facility]

## 2019-10-31 NOTE — PROGRESS NOTES
Chart reviewed since yesterday.  Palliative care consult obtained yesterday afternoon.  Family breast interest and has proceeded with comfort measures only.  Antibiotics in the abdominal drain have been discontinued.  No further surgical input at this time.  We will sign off.

## 2019-10-31 NOTE — PLAN OF CARE
Pt continues comfort care measures. Pt alert to self only. Denied pain. Open scabs on right buttock-mepilex in place. Scattered bruising. Turn/repo q2hrs. Incontinent of bowel/bladder. External purwick in place. ANABEL drain with thick yellow output. PICC-SL. Pt became more agitated towards end of shift-gave prn zyprexa x1. Continue to keep comfortable.

## 2019-10-31 NOTE — PROGRESS NOTES
Alert. Disoriented x 3. Reoriented with each interaction. More anxious and fearful around 1030. Reassurance and emotional support not effective. MD updated. PRN Seroquel ordered. Better after PRN PO Seroquel x 1. On comfort cares since yday (10/30). Comfort measures implemented. Turned and repositioned q 2 hrs. Large watery stools x 3 this shift. No blood. Order received to discontinue the PICC line. IV nurse e-paged and updated. ANBAEL drain patent and to gravity. In preparation for a LTC's discharge, will stop the 1:1 sitter @ 1500. Bed alarm will be on 'zone 2' for safety. Staff will round on pt more frequently. Will closely monitor.

## 2019-10-31 NOTE — DISCHARGE SUMMARY
[facility]  Discharge Summary        Sintia Connors MRN# [id number]   YOB: 1935 Age: 84 year old     Date of Admission: 10/20/2019  Date of Discharge: 11/1/2019  Admitting Physician: Salvatore Reardon MD  Discharge Physician: Dirk Barillas MD     Primary Provider: [facility] Living([facility]), [facility]  Primary Care Physician Phone Number: [phone]         Discharge Diagnoses:   1. Bilateral subsegmental PEs with acute hypoxic respiratory failure.  2. Bilateral lower extremity DVTs.  3. Right heart failure, suspect due to above.  4. Pelvic abscess, suspect related to diverticular disease.  5. Bowel obstruction vs ileus, possibly related to above #4.  6. Presumed LLL community acquired pneumonia.  7. Sepsis/SIRS, suspect related to bilateral PE, also pelvic absces..  8. Acute on chronic diastolic CHF exacerbation (HFpEF) due to volume resuscitation.  9. Rhabdomyolysis.   10. Atrial fibrillation with RVR, suspect driven by PE.  11. Hypernatremia and hyperchloremia, suspect related to dehydration.  12. Acute kidney injury.          Other Chronic Medical Problems:      1. Amyloid angiopathy.  2. 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  3. Seizures.  4. 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  5. Depression with psychotic features.  6. HTN.  7. Hypothyroidism.  8. GERD.       Allergies:         Allergies   Allergen Reactions     Lorazepam Visual Disturbance     Severe hallucinations;           Discharge Medications:        Current Discharge Medication List      START taking these medications    Details   atropine 1 % ophthalmic solution Take 2-4 drops by mouth, place under tongue or place inside cheek every 2 hours as needed for other (terminal respiratory secretions) Not for ophthalmic use.  Qty: 5 mL, Refills: 1    Associated Diagnoses: Palliative care patient      bisacodyl (DULCOLAX) 10 MG suppository Unwrap and insert 1  suppository rectally twice daily as needed for constipation.  Qty: 12 suppository, Refills: 1    Associated Diagnoses: Palliative care patient      HYDROmorphone (DILAUDID) 2 MG tablet Take 0.5 tablets (1 mg) by mouth every 2 hours as needed for pain (Dyspnea)  Qty: 30 tablet, Refills: 0    Associated Diagnoses: Palliative care patient      !! MEDICATION INSTRUCTION If care facility cannot accept or use ranges, facility is instructed to use lower end of dosing range    Associated Diagnoses: Palliative care patient      !! MEDICATION INSTRUCTION If care facility cannot accept or use ranges, facility is instructed to use lower end of dosing range    Associated Diagnoses: Palliative care patient      ondansetron (ZOFRAN-ODT) 4 MG ODT tab Take 1 tablet (4 mg) by mouth every 6 hours as needed for nausea or vomiting  Qty: 30 tablet, Refills: 0    Associated Diagnoses: Palliative care patient      !! QUEtiapine (SEROQUEL) 25 MG tablet Take 0.5 tablets (12.5 mg) by mouth 4 times daily as needed (anxiety, agitation)  Qty: 30 tablet, Refills: 0    Associated Diagnoses: Palliative care patient      sennosides (SENOKOT) 8.6 MG tablet Take 1 tablet by mouth 2 times daily  Qty: 100 tablet, Refills: 1    Associated Diagnoses: Palliative care patient       !! - Potential duplicate medications found. Please discuss with provider.      CONTINUE these medications which have CHANGED    Details   acetaminophen (TYLENOL) 325 MG tablet Take 2 tablets (650 mg) by mouth every 4 hours as needed for mild pain    Associated Diagnoses: Palliative care patient         CONTINUE these medications which have NOT CHANGED    Details   diclofenac (VOLTAREN) 1 % GEL APPLY 4 GRAMS TO KNEES OR 2 GRAMS TO HANDS FOUR TIMES DAILY USING ENCLOSED DOSING CARD.  Qty: 100 g, Refills: 3    Associated Diagnoses: Other secondary osteoarthritis of knee      levETIRAcetam (KEPPRA) 750 MG tablet Take 750 mg by mouth 2 times daily      omeprazole (PRILOSEC) 10 MG   capsule TAKE ONE CAPSULE BY MOUTH DAILY  Qty: 30 capsule, Refills: 3    Comments: This prescription was filled on 7/22/2019. Any refills authorized will be placed on file.  Associated Diagnoses: Gastroesophageal reflux disease without esophagitis      !! QUEtiapine (SEROQUEL) 50 MG tablet Take 2 tablets (100 mg) by mouth 2 times daily  Qty: 60 tablet, Refills: 0    Associated Diagnoses: Paranoia (H); Severe episode of recurrent major depressive disorder, with psychotic features (H)      !! ORDER FOR DME Equipment being ordered: Shower Chair  Qty: 1 each, Refills: 0    Associated Diagnoses: Osteoarthritis of knee      !! ORDER FOR DME Equipment being ordered: Bath transfer bench  Fax to:[phone]  Qty: 1 Device, Refills: 0    Associated Diagnoses: Osteoarthritis      !! ORDER FOR DME Equipment being ordered: transfer bath bench  Qty: 1 Device, Refills: 0    Associated Diagnoses: Osteoarthritis       !! - Potential duplicate medications found. Please discuss with provider.      STOP taking these medications       aspirin (ASA) 81 MG chewable tablet Comments:   Reason for Stopping:         calcium carbonate (TUMS) 500 MG chewable tablet Comments:   Reason for Stopping:         doxycycline hyclate (VIBRAMYCIN) 100 MG capsule Comments:   Reason for Stopping:         Glucos-Chond-Hyal Ac-Ca Fructo (MOVE FREE JOINT HEALTH ADVANCE) TABS Comments:   Reason for Stopping:         glucosamine-chondroitin 500-400 MG CAPS per capsule Comments:   Reason for Stopping:         levothyroxine (SYNTHROID/LEVOTHROID) 25 MCG tablet Comments:   Reason for Stopping:         metoprolol tartrate (LOPRESSOR) 25 MG tablet Comments:   Reason for Stopping:         Specialty Vitamins Products (WOMENS JOHN JEET) MISC Comments:   Reason for Stopping:         vitamin B-Complex Comments:   Reason for Stopping:         vitamin D3 (CHOLECALCIFEROL) 1000 units (25 mcg) tablet Comments:   Reason for Stopping:                   Discharge Instructions  and Follow-Up:      Discharge Orders      Mantoux instructions    Give two-step Mantoux (PPD) Per Facility Policy Yes     Follow Up and recommended labs and tests    Follow-up with hospice provider.     Reason for your hospital stay    Bilateral PE.  Bilateral DVT.  Bowel obstruction vs ileus.  Pelvic abscess.  Rhabdomyolysis.  Possible pneumonia.  SARA.  Hypernatremia.     Activity - Up with nursing assistance     General info for SNF    Length of Stay Estimate: long term care/hospice  Condition at Discharge: stable  Level of care:skilled   Rehabilitation Potential: N/A  Admission H&P remains valid and up-to-date: Yes  Recent Chemotherapy: N/A  Use Nursing Home Standing Orders: Yes     Oxygen - Nasal cannula    2 Lpm by nasal cannula for comfort.     Advance Diet as Tolerated    Follow this diet upon discharge: Orders Placed This Encounter      Room Service      Full Liquid Diet             Consultations This Hospital Stay:      1. Cardiology.  2. Neurology  3. Surgery.  4. Gastroenterology ([facility]).  5. Palliative Cared.        Admission History:      Please see the H&P by Salvatore Reardon MD on 10/20/2019 for complete details. Briefly, Sintia Connors is a 84 year old female with PMH significant for obesity; HTN; afib/flutter; amyloid angiopathy; h/o hemorrhagic CVA; seizure disorder; and dementia with paranoia; who was brought to [facility] 10/20/2019 after being found down. Iinitial workup revealed signs severe sepsis, SARA, rhabdomyolysis and possible pneumonia.        Problem Oriented Hospital Course:      1. Bilateral subsegmental PEs with acute hypoxic respiratory failure.  2. Bilateral lower extremity DVTs.  3. Right heart failure, suspect due to above.  CT chest obtained on 10/24 due to ongoing tachycardia, O2 requirement and persistently elevated WBC despite treatment of presumed pneumonia; this revealed bilateral pulmonary emboli predominantly in the subsegmental branches. Neurology ([facility]) consulted  regarding initiation of anticoagulation in the setting of amyloid angiopathy and prior hx of intracranial bleed in the setting of anticoagulation. Neuro reviewed prior MRIs and did not recommend anticoagulation due extent of amyloid angiopathy on imaging and increased risk of intracranial bleed. LE US on 10/24 showed DVT in bilateral CFV, femoral veins, left popliteal, bilateral posterior tibial veins.  Due to extensive DVT and risk of fatal thromboembolic event and inability to anticoagulate, IVC filter was placed 10/25. Echo previously done this hospitalization 10/21 showed LVEF 60-65%; RV moderate to severely dilated, mildly decreased right ventricular systolic function    4. Pelvic abscess, suspect related to diverticular disease.  5. Bowel obstruction vs ileus, possibly related to above.  Abdominal distention noted. Abdominal x-ray on 10/22/19 showed normal gas pattern. CT scan abdomen/pelvis on 10/26/19 showed dilated ascending and transverse colon without definite cause for obstruction demonstrated, question a distal transverse colon stricture or dilated colon related to ileus, with small bowel loops mildly dilated and fluid-filled; mottled low-density collection with foci of air in the inferior left pelvis concerning for an abscess or possibly an infected hematoma. Started on empiric vancomycin and zosyn on 10/26/19. Surgery consulted. Drain placed by IR on 10/27/19. Culture of abscess fluid growing Streptococcus constellatus, Streptococcus intermedius, Bacteroides ovatus/xylanisolvens, gram negative rods. Abdominal x-ray 10/28 showed marked increase in colonic distension (compared with 10/22), differential included severe colonic ileus vs colonic obstruction. NG placed 10/28 and GI consulted. Antibiotics stopped 10/30 with transition to comfort cares. Had gastrograffin enema on 10/29/19 that did not show any obstruction. AXR 10/30 showed dilated colon, not significantly changed since 10/28.    6. Presumed  LLL community acquired pneumonia.  * She was recently seen in the [place] ER, started on oral doxycycline for left lower lobe pneumonia.   * On initial evaluation 10/20, pt tachycardic; WBC 31.6 and lactic acid 2.3; CXR on admission with LLL infiltrate. Started on levofloxacin 10/20. Follow-up CXR 10/22 without clear airspace disease. CT of the chest on 10/24 showed atelectasis, small bilateral pleural effusions, but no clear pneumonia (showed PE as above). Completed levofloxacin 10/26.    7. Sepsis/SIRS, suspect related to bilateral PE, also pelvic abscess.  Patient presented 10/20 with signs of sepsis as above. Initially felt to be secondary to pneumonia and treated with antibiotics as above. However she was subsequently diagnosed with bilateral pulmonary emboli which was then felt to be the source. Subsequently, she was found to have a pelvic abscess as noted above.      8. Acute on chronic diastolic CHF exacerbation (HFpEF) due to volume resuscitation.  Echo 10/21 showed LVEF 60-65%; RV moderate to severely dilated, mildly decreased right ventricular systolic function. She was up 3-4L in the setting of volume resuscitation from rhabdomyolysis. IVF stopped and received Lasix 20mg IV on 10/23 with decent urine output.      9. Rhabdomyolysis.   CK level 6077 on admit 10/20. With fluids, CK down to 518 on 10/23.    10. Atrial fibrillation with RVR, suspect driven by PE.  * Has a history of atrial fibrillation/flutter in the past. Not on anticoagulation due to history of hemorraghic CVA and questionable amyloid angiopathy. PTA on metoprolol.  * Went into atrial fibrillation with RVR while in the ER. Treated with metoprolol and digoxin. Echo 10/21 showed LVEF 60-65%; RV moderate to severely dilated, mildly decreased right ventricular systolic function. Cardiology consulted.     11. Hypernatremia and hyperchloremia, suspect related to dehydration.  Recent Labs   Lab 10/30/19  0625 10/29/19  1527 10/29/19  0702  10/28/19  0710 10/27/19  0736 10/26/19  1411   * 154* 153* 150* 147* 148*     12. Acute kidney injury.  Creatinine 2.89 on admission. Resolved with IVF's.    Goals of care.  Son  changed code status from full code to DNR/DNI on 10/26/19. Palliative care consulted 10/30. Ultimately, son made decision to transition to comfort cares 10/30.              Pending Results:        Unresulted Labs Ordered in the Past 30 Days of this Admission     Date and Time Order Name Status Description    10/27/2019 1329 Anaerobic bacterial culture Preliminary                 Discharge Disposition:      Discharged to NH with hospice.        Discharge Time:      Less than 30 minutes.        Key Imaging Studies, Lab Findings and Procedures/Surgeries:        Results for orders placed or performed during the hospital encounter of 10/20/19   Chest XR,  PA & LAT    Narrative    CHEST TWO VIEW   10/20/2019 2:54 PM     HISTORY: Sepsis.    COMPARISON: Chest x-ray 5/12/2015.      Impression    IMPRESSION: Two views of the chest performed. Left lower lobe airspace  consolidation is increased compared to prior study probably indicating  left lower lobe pneumonia. Right lung is clear. Heart size is probably  within normal limits but the left heart border is obscured by the  airspace consolidation. No pneumothorax or obvious pleural effusions.    SHIMON RAMOS MD   XR Abdomen Port 1 View    Narrative    ABDOMEN ONE VIEW PORTABLE October 22, 2019 1:10 PM     HISTORY: Abdominal distention.    COMPARISON: August 24, 2006.       Impression    IMPRESSION: Minimal amount of stool. Nonobstructed bowel gas pattern.    BENNIE NAYAK MD   XR Chest Port 1 View    Narrative    CHEST ONE VIEW PORTABLE   10/23/2019 9:45 AM     HISTORY: Hypoxia.    COMPARISON: 10/20/2019      Impression    IMPRESSION: Prominent heart size similar to prior. There is fullness  of the right hilum that is also similar to prior. No airspace  consolidation, pneumothorax, or pleural  effusion. Mild interstitial  thickening may be related to early edema. Follow-up PA and lateral  recommended when appropriate.    VLAD ARGUELLO MD   CT Chest Pulmonary Embolism w Contrast    Narrative    CT CHEST PULMONARY EMBOLISM WITH CONTRAST 10/24/2019 12:09 PM    HISTORY: 84-year-old woman with hypoxia and shortness of breath.  Patient has rapid atrial fibrillation, as well.    COMPARISON: None    TECHNIQUE: Axial and coronal CT images obtained from the lung apices  through the lung bases after the uneventful administration of Isovue  370 intravenous contrast given for a total of 79 mL. Radiation dose  for this scan was reduced using automated exposure control, adjustment  of the mA and/or kV according to patient size, or iterative  reconstruction technique.    FINDINGS: No obvious abnormally enlarged mediastinal lymph nodes.  Heart size is not enlarged. Visible solid organs in the upper abdomen  are unremarkable. Moderate hiatal hernia. No acute osseous  abnormality. Small bilateral pleural effusions. Scattered bibasilar  opacities, in some areas consolidation corresponding to atelectasis,  though underlying pneumonia could not be excluded radiographically.  Poor opacification of the thoracic aorta. Bilateral subsegmental  pulmonary emboli are identified, in left upper lobe pulmonary arteries  more so than lower lobe pulmonary arteries. Similarly, right upper  lobe pulmonary arteries and lower lobe arteries. Overall thrombus  burden is not great, though reflux of contrast into the hepatic veins  as well as an RV/LV ratio of approximately 1.3 suggest variable degree  of right heart strain.      Impression    IMPRESSION:  1. Bilateral pulmonary emboli, predominantly in subsegmental branches.  Given CT suggestion of right heart strain with RV/LV ratio of 1.3 and  reflux in the hepatic veins, would recommend echocardiogram. Also  recommend anticoagulation if clinically appropriate.  2. Small bilateral pleural  effusions.  3. Moderate hiatal hernia.    Please note the above findings of bilateral pulmonary emboli were  discussed by myself to patient's nurse, Ave at 12:30 PM on  October 24, 2019.    RM VIEIRA MD   US Lower Extremity Venous Duplex Bilateral    Narrative    ULTRASOUND VENOUS LOWER EXTREMITY BILATERAL 10/24/2019 5:08 PM     HISTORY: PE, rule out deep vein thrombosis.      COMPARISON: None.    TECHNIQUE: Ultrasound gray scale, Color Doppler flow, and spectral  Doppler waveform analysis performed.      Impression    IMPRESSION: Deep vein thrombus in the bilateral common femoral and  femoral veins. Deep vein thrombus in the left popliteal vein. Deep  vein thrombus in the bilateral posterior tibial veins. Deep vein  thrombus in the left peroneal vein.    BENNIE NAYAK MD   IR IVC Filter Placement    Narrative    IVC FILTER PLACEMENT 10/25/2019 11:18 AM    CLINICAL HISTORY: Patient has a pulmonary embolism and lower extremity  DVT. Patient cannot be anticoagulated due to intracranial amyloid  angiopathy.    DESCRIPTION OF PROCEDURE: After obtaining informed consent, the  patient was placed in a supine position on the fluoroscopy table. The  right groin was prepped and draped in the usual sterile manner. 1%  lidocaine was injected for local anesthesia. Ultrasound was used to  evaluate and document patency of the right femoral vein. Under sterile  ultrasound guidance, a puncture was made into the femoral vein. A  permanent copy image was obtained for the patient's records.    An 8.5 Japanese vascular sheath was placed and taken into the lower IVC.  A venogram was performed. The renal vein inflow was identified. No  thrombus in the IVC was identified.     A retrievable [place] filter was placed with apex positioned at the  level of the renal vein inflow. The right femoral sheath was pulled.  Pressure was held at the puncture site for 7 minutes with good  hemostasis.    I determined this patient to be an  appropriate candidate for the  planned sedation and procedure and reassessed the patient immediately  prior to sedation and procedure. The patient was independently  monitored by a registered nurse assigned to the Department of  radiology using automated blood pressure, EKG and pulse oximetry. The  patient tolerated the procedure well. There were no immediate  postprocedure complications. The patient's vital signs were monitored  by radiology nursing staff under my supervision and remained stable  throughout the study. Radiation dose for this scan was reduced using  automated exposure control, adjustment of the mA and/or kV according  to patient size, or iterative reconstruction technique.    FLUOROSCOPY TIME: 0.9 minutes    RADIATION DOSE: 124 mGy Air Kerma    MEDICATIONS: 100 mg fentanyl    SEDATION TIME: None    CONTRAST: 20 cc      Impression    IMPRESSION:  IVC filter placed as above.    ESTELA BACON MD   XR Chest Port 1 View    Narrative    CHEST ONE VIEW UPRIGHT 10/26/2019 11:40 AM     HISTORY: Hypoxia    COMPARISON: October 23, 2019      Impression    IMPRESSION: Mildly prominent cardiac fat pad at the left base again  noted. No definite acute infiltrates.    BENNIE NAYAK MD   CT Abdomen Pelvis w/o Contrast    Narrative    CT ABDOMEN PELVIS WITHOUT CONTRAST   10/26/2019 3:59 PM     HISTORY: Abdominal pain, acute, generalized.    TECHNIQUE:   No IV contrast material. Radiation dose for this scan was  reduced using automated exposure control, adjustment of the mA and/or  kV according to patient size, or iterative reconstruction technique.    COMPARISON: None.    FINDINGS:  Airspace opacities noted in the posterior aspects of both  lower lobes that appear associated with volume loss compatible with  bibasilar atelectasis. There is a large hiatal hernia present.  Moderate coronary atherosclerosis noted.    The unenhanced liver, spleen, pancreas, adrenal glands, and kidneys  contain no worrisome  abnormalities. An IVC filter is noted. The  ascending and transverse colon are dilated without cause for  obstruction demonstrated. The descending colon, sigmoid colon, and  rectum are decompressed. Dilated transverse colon measures up to 8.4  cm. Cecum diameter measures up to 14.7 cm.    In the left side of the pelvis, there is a mottled low-density  collection with several foci of air that measures approximately 11.2 x  5.4 cm in the axial plane (series 3, image 76) and approximately 4.7  cm craniocaudal. The uterus is absent.    No large abdominal or retroperitoneal lymph nodes. No pelvic  adenopathy. No free air or ascites. No lytic or blastic bone lesions.      Impression    IMPRESSION:  1. Bibasilar atelectasis.  2. Dilated ascending and transverse colon without definite cause for  obstruction demonstrated. There could be a distal transverse colon  stricture or dilated colon may be related to ileus. Small bowel loops  are mildly dilated and fluid-filled.  3. Mottled low-density collection with foci of air in the inferior  LEFT pelvis is concerning for an abscess or possibly an infected  hematoma.     VLAD ARGUELLO MD   CT Abdomen Peritonium Abscess Drainage    Narrative    CT ABDOMEN PERITONEUM ABSCESS DRAIN W CATH PLACE October 27, 2019 1:50  PM     HISTORY: Patient has a left pelvic abscess.    COMPARISON: CT scan of the abdomen/pelvis dated 10/26/2019.    FINDINGS: After obtaining informed consent from the patient's son  Pérez Connors, the patient was placed in a supine position on the  fluoroscopy table. The left pelvis was prepped and draped in the usual  sterile manner. 1% lidocaine was injected for local anesthesia. Under  CT guidance, using a blunt Engel needle, access into a left pelvic  fluid collection was obtained. A wire was curled in the collection  cavity. Over the wire, a 10 Lao locking pigtail catheter was  placed. Approximately 150 mL of brown-colored feculent appearing fluid  was  aspirated out. Some of this was submitted for Gram stain and  culture. The catheter was sutured to the patient's skin and hooked to  a bulb for external drainage.    I determined this patient to be an appropriate candidate for the  planned sedation and procedure and reassessed the patient immediately  prior to sedation and procedure. The patient was independently  monitored by a registered nurse assigned to the Department of  radiology using automated blood pressure, EKG and pulse oximetry. The  patient tolerated the procedure well. There were no immediate  postprocedure complications. The patient's vital signs were monitored  by radiology nursing staff under my supervision and remained stable  throughout the study. Radiation dose for this scan was reduced using  automated exposure control, adjustment of the mA and/or kV according  to patient size, or iterative reconstruction technique.    MEDICATIONS: 100 mcg fentanyl.    Sedation time: None.      Impression    IMPRESSION: CT-guided drain placed into a left pelvic fluid collection  as above.    ESTELA BACON MD   XR Abdomen Port 1 View    Narrative    ABDOMEN ONE VIEW PORTABLE  10/28/2019 9:45 AM     HISTORY: Abdominal distention.    COMPARISON: October 22, 2019       Impression    IMPRESSION: Marked increase in colonic distention since the comparison  study, severe distention of the cecum noted. There is no cecal  volvulus on a CT from a procedural scan one day prior. Differential  includes severe colonic ileus versus colonic obstruction.    BENNIE NAYAK MD   XR Colon Water Soluble Therapeutic    Narrative    COLON WATER SOLUBLE  10/29/2019 11:53 AM     HISTORY: Right-sided colonic distention.    COMPARISON: None.    TECHNIQUE: Water soluble contrast was introduced into the colon  through a rectal tube under gravity.      FLUOROSCOPY TIME: 0.7 minutes.    SPOT FILMS: 8    FINDINGS: Contrast flowed easily into the colon. The colon is widely  patent from the  rectum to the cecum. Dilated proximal colon again  noted but this is not related to obstruction or volvulus as the  contrast easily reached the cecum. No significant amount of stool.  There was no reflux of contrast into the terminal ileum. There were no  filling defects.      Impression    IMPRESSION: No obstruction demonstrated.    BENNIE NAYAK MD   XR Chest Port 1 View    Narrative    OBLIQUE FRONTAL CHEST  10/29/2019 3:43 PM     HISTORY: PICC tip verification.    COMPARISON: None.      Impression    IMPRESSION: The PICC line is difficult to see, the tip is likely in  the central atrium consider retracting 4 cm. Nasogastric tube tip in  left upper quadrant in the expected location of stomach.    BENNIE NAYAK MD   XR Chest Port 1 View    Narrative    CHEST ONE VIEW PORTABLE   10/29/2019 5:20 PM     HISTORY:  PICC placement.    COMPARISON: Chest radiograph performed earlier today at 3:34 PM.      Impression    IMPRESSION: Right PICC is in place, with tip in the low SVC. Enteric  tube has been removed. No other significant interval change.    STEPHANIA MCPHERSON MD   XR Abdomen 1 View    Narrative    ABDOMEN ONE VIEW  10/30/2019 10:25 AM     HISTORY: Evaluate for improvement in colonic dilation.    COMPARISON: 10/28/2019      Impression    IMPRESSION: Dilated colon is not significantly changed compared to the  prior study. There is a pigtail drainage catheter projected over the  pelvis. No definite free air. An IVC filter is noted.    VLAD ARGUELLO MD   Echocardiogram Complete    Narrative    [id number]  [id number]  [id number]  [id number]^LUCILLE^REE           [facility]  Echocardiography Laboratory  [address] [place] [address]        Name: GRACIE VILLARREAL  MRN: [id number]  : 1935  Study Date: 10/21/2019 03:26 PM  Age: 84 yrs  Gender: Female  Patient Location: [facility]  Reason For Study: Afib  Ordering Physician: REE ADKINS  Referring Physician: [facility]  Performed By: Mendez Flores RDCS     BSA: 2.0 m2  Height: 62 in  Weight: 235 lb  HR: 101  BP: 117/58 mmHg  _____________________________________________________________________________  __        Procedure  Complete Portable Echo Adult. Optison (NDC #[id number]) given intravenously.  _____________________________________________________________________________  __        Interpretation Summary     Technically VERY difficult study with poor windows despite contrast use. Most  cardiac structures are not well visualized.  The visual ejection fraction is estimated at 60-65%.  The right ventricle is moderate to severely dilated.  Mildly decreased right ventricular systolic function  _____________________________________________________________________________  __        Left Ventricle  The left ventricle is normal in size. The visual ejection fraction is  estimated at 60-65%. Diastolic Doppler findings (E/E' ratio and/or other  parameters) suggest left ventricular filling pressures are normal.     Right Ventricle  The right ventricle is moderate to severely dilated. Mildly decreased right  ventricular systolic function.     Atria  The left atrium is not well visualized. The left atrium is mild to moderately  dilated. The left atrial volume measurement is unreliable due to technically  difficult study. Right atrium not well visualized. The right atrium is  moderate to severely dilated.     Mitral Valve  The mitral valve is not well visualized. There is trace to mild mitral  regurgitation.        Tricuspid Valve  There is moderate (2+) tricuspid regurgitation. The right ventricular systolic  pressure is approximated at 43.7 mmHg plus the right atrial pressure.  Pulmonary hypertension.     Aortic Valve  The aortic valve is not well visualized.     Pulmonic Valve  The pulmonic valve is not well visualized.     Vessels  The aortic root is normal size. Dilation of the inferior vena cava is present  with abnormal  respiratory variation in diameter.     Pericardium  There is no pericardial effusion.     _____________________________________________________________________________  __  MMode/2D Measurements & Calculations  IVSd: 1.2 cm  LVIDd: 3.6 cm  LVIDs: 2.4 cm  LVPWd: 0.89 cm  FS: 33.7 %  LV mass(C)d: 117.1 grams  LV mass(C)dI: 57.2 grams/m2     Ao root diam: 3.2 cm  LA dimension: 4.3 cm  asc Aorta Diam: 3.5 cm  LA/Ao: 1.3  LVOT diam: 2.1 cm  LVOT area: 3.4 cm2  LA Volume (BP): 48.8 ml  LA Volume Index (BP): 23.8 ml/m2  RWT: 0.50        Doppler Measurements & Calculations  MV E max daya: 81.2 cm/sec  MV dec time: 0.20 sec     PA acc time: 0.07 sec  TR max daya: 329.7 cm/sec  TR max P.7 mmHg  E/E' av.9  Lateral E/e': 7.5  Medial E/e': 8.4           _____________________________________________________________________________  __           Report approved by: Michele Rdz 10/21/2019 04:50 PM

## 2019-11-01 NOTE — PROGRESS NOTES
[facility]    Internal Medicine Hospitalist Progress Note  11/01/2019  I evaluated patient on the above date.    Dirk Barillas Jr., MD  [phone] (p)  Text Page        Assessment & Plan New actions/orders today (11/01/2019) are underlined.    Sintia Connors is a 84 year old female with PMH significant for obesity; HTN; afib/flutter; amyloid angiopathy; h/o hemorrhagic CVA; seizure disorder; and dementia with paranoia; who was brought to [facility] 10/20/2019 after being found down. Iinitial workup revealed signs severe sepsis, SARA, rhabdomyolysis and possible pneumonia.    ASSESSMENT:  1. Bilateral subsegmental PEs with acute hypoxic respiratory failure.  2. Bilateral lower extremity DVTs.  3. Right heart failure, suspect due to above.  CT chest obtained on 10/24 due to ongoing tachycardia, O2 requirement and persistently elevated WBC despite treatment of presumed pneumonia; this revealed bilateral pulmonary emboli predominantly in the subsegmental branches. Neurology consulted regarding initiation of anticoagulation in the setting of amyloid angiopathy and prior hx of intracranial bleed in the setting of anticoagulation. Neuro reviewed prior MRIs and did not recommend anticoagulation due extent of amyloid angiopathy on imaging and increased risk of intracranial bleed. LE US on 10/24 showed DVT in bilateral CFV, femoral veins, left popliteal, bilateral posterior tibial veins.  Due to extensive DVT and risk of fatal thromboembolic event and inability to anticoagulate, IVC filter was placed 10/25. Echo previously done this hospitalization 10/21 showed LVEF 60-65%; RV moderate to severely dilated, mildly decreased right ventricular systolic function    4. Pelvic abscess, suspect related to diverticular disease.  5. Bowel obstruction vs ileus, possibly related to above.  Abdominal distention noted. Abdominal x-ray on 10/22/19 showed normal gas pattern. CT scan abdomen/pelvis on 10/26/19 showed dilated  ascending and transverse colon without definite cause for obstruction demonstrated, question a distal transverse colon stricture or dilated colon related to ileus, with small bowel loops mildly dilated and fluid-filled; mottled low-density collection with foci of air in the inferior left pelvis concerning for an abscess or possibly an infected hematoma. Started on empiric vancomycin and zosyn on 10/26/19. Surgery consulted. Drain placed by IR on 10/27/19. Culture of abscess fluid growing Streptococcus constellatus, Streptococcus intermedius, Bacteroides ovatus/xylanisolvens, gram negative rods. Abdominal x-ray 10/28 showed marked increase in colonic distension (compared with 10/22), differential included severe colonic ileus vs colonic obstruction. NG placed 10/28 and GI consulted. Antibiotics stopped 10/30 with transition to comfort cares. Had gastrograffin enema on 10/29/19 that did not show any obstruction. AXR 10/30 showed dilated colon, not significantly changed since 10/28.    6. Presumed LLL community acquired pneumonia.  * She was recently seen in the [place] ER, started on oral doxycycline for left lower lobe pneumonia.   * On initial evaluation 10/20, pt tachycardic; WBC 31.6 and lactic acid 2.3; CXR on admission with LLL infiltrate. Started on levofloxacin 10/20. Follow-up CXR 10/22 without clear airspace disease. CT of the chest on 10/24 showed atelectasis, small bilateral pleural effusions, but no clear pneumonia (showed PE as above). Completed levofloxacin 10/26.    7. Sepsis/SIRS, suspect related to bilateral PE, also pelvic abscess.  Patient presented 10/20 with signs of sepsis as above. Initially felt to be secondary to pneumonia and treated with antibiotics as above. However she was subsequently diagnosed with bilateral pulmonary emboli which was then felt to be the source. Subsequently, she was found to have a pelvic abscess as noted above.      8. Acute on chronic diastolic CHF exacerbation  (HFpEF) due to volume resuscitation.  Echo 10/21 showed LVEF 60-65%; RV moderate to severely dilated, mildly decreased right ventricular systolic function. She was up 3-4L in the setting of volume resuscitation from rhabdomyolysis. IVF stopped and received Lasix 20mg IV on 10/23 with decent urine output.      9. Rhabdomyolysis.   CK level 6077 on admit 10/20. With fluids, CK down to 518 on 10/23.    10. Atrial fibrillation with RVR, suspect driven by PE.  * Has a history of atrial fibrillation/flutter in the past. Not on anticoagulation due to history of hemorraghic CVA and questionable amyloid angiopathy. PTA on metoprolol.  * Went into atrial fibrillation with RVR while in the ER. Treated with metoprolol and digoxin. Echo 10/21 showed LVEF 60-65%; RV moderate to severely dilated, mildly decreased right ventricular systolic function. Cardiology consulted.     11. Hypernatremia and hyperchloremia, suspect related to dehydration.  Recent Labs   Lab 10/30/19  0625 10/29/19  1527 10/29/19  0702 10/28/19  0710 10/27/19  0736 10/26/19  1411   * 154* 153* 150* 147* 148*     12. Acute kidney injury, resolved.  Creatinine 2.89 on admission. Resolved with IVF's.    Goals of care.  Son  changed code status from full code to DNR/DNI on 10/26/19. Palliative care consulted 10/30. Ultimately, son made decision to transition to comfort cares 10/30.        OTHER CHRONIC ISSUES:  1. Amyloid angiopathy.  2. 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  3. Seizures.  4. 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  5. Depression with psychotic features.  6. HTN.  7. Hypothyroidism.  8. GERD.      PLAN:  - Continue comfort measures.  - Plan discharge to hospice facility.    Diet: Room Service  Full Liquid Diet  Advance Diet as Tolerated    Prophylaxis: PCD's, ambulation.   Sheets Catheter: not present  Code Status: DNR/DNI      Disposition Plan   Expected discharge: Today, recommended to [place] with  "hospice.  Entered: Dirk Barillas MD 11/01/2019, 11:49 AM         Interval History   Pt awake, wanting to sit in chair.    -Data reviewed today: I reviewed all new labs and imaging over the last 24 hours. I personally reviewed no images or EKG's today.    Physical Exam   Heart Rate: 86, Blood pressure 139/63, pulse 89, temperature 97.6  F (36.4  C), temperature source Axillary, resp. rate 14, height 1.575 m (5' 2\"), weight 111.2 kg (245 lb 3.2 oz), SpO2 (!) 82 %, not currently breastfeeding.  Vitals:    10/21/19 0614 10/22/19 0600 10/26/19 0515   Weight: 106.9 kg (235 lb 11.2 oz) 108.2 kg (238 lb 8 oz) 111.2 kg (245 lb 3.2 oz)     Vital Signs with Ranges  Temp:  [97.6  F (36.4  C)] 97.6  F (36.4  C)  Pulse:  [89] 89  Heart Rate:  [86] 86  Resp:  [14] 14  BP: (139)/(63) 139/63  SpO2:  [82 %] 82 %  Patient Vitals for the past 24 hrs:   BP Temp Temp src Pulse Heart Rate Resp SpO2   11/01/19 0718 139/63 97.6  F (36.4  C) Axillary 89 86 14 (!) 82 %   11/01/19 0100 -- -- -- -- -- 14 --     I/O's Last 24 hours  I/O last 3 completed shifts:  In: 370 [P.O.:370]  Out: 500 [Urine:500]    Constitutional: Awake, fatigued appearing.  Respiratory:   Cardiovascular:   GI:   Skin/Integumen:   Other:        Data   Recent Labs   Lab 10/30/19  0625 10/29/19  1527 10/29/19  0702 10/28/19  0710 10/27/19  0736   WBC 15.4*  --  15.2* 21.1* 28.3*   HGB 12.4  --  12.1 13.3 13.7   MCV 93  --  93 92 91     --  201 219 213   * 154* 153* 150* 147*   POTASSIUM 3.1* 3.5 3.5 3.7 3.8   CHLORIDE 125* 126* 125* 121* 121*   CO2 21 24 21 24 21   BUN 15 17 19 23 27   CR 0.88 0.92 0.95 1.06* 0.75   ANIONGAP 6 4 7 5 5   FLAVIA 8.4* 8.5 8.6 9.0 8.9   * 106* 142* 112* 139*   ALBUMIN 2.1*  --   --   --  2.2*   PROTTOTAL 5.3*  --   --   --  5.8*   BILITOTAL 0.9  --   --   --  1.0   ALKPHOS 75  --   --   --  88   ALT 37  --   --   --  50   AST 24  --   --   --  23     Recent Labs   Lab Test 10/30/19  0625 10/29/19  1527 10/29/19  0702 " 10/28/19  0710 10/27/19  0736   * 106* 142* 112* 139*         No results found for this or any previous visit (from the past 24 hour(s)).    Medications   All medications were reviewed.      levETIRAcetam  750 mg Oral BID     OLANZapine zydis  5 mg Oral At Bedtime     sodium chloride (PF)  10 mL Intracatheter Q8H

## 2019-11-01 NOTE — CONSULTS
11/1/19 Rn visit to coordinate hospice cares and discharge. Met with son, explained hospice benefit to son and worked with him to sign the consents for hospice services. Coordinated discharge meds and cares and called NP at facility. Coordinated with SW and palliative team.    Comfort meds discussed with DR Barillas, see below:    Hydromorphone 2 mg tabs- May give 1 mg orally as needed every 2 hours for pain or SOB  Atropine sulfate 1%- May give 2 drops SL as needed every 2 hours for copious secretions  Bisacodyl 10 mg - May give 1 supp rectally BID PRN for constipation  Senna 8.6 mg tabs- May give 1 tab BID PRN for constipation  Quetiapine 100 mg tabs - BID scheduled, 12.5 mg QID PRN for paranoia or severe agitation with risk of self harm  zofran ODT 4 tabs- May have 1 tab Q6 h prn for nausea    Feel free to call with questions: [phone]

## 2019-11-01 NOTE — PLAN OF CARE
"A&Ox1; self only. Comfort cares. VS and formal assessment deferred. T&R q2h. 0545 PRN pain meds given per pt request for \"pain all over\". R PICC/L Pelvic drain dressings-CDI. Incontinent of B&B. Purewick in place - dark ephraim urine output. Skin tears to R upper back; scabbed SHANE. R buttock Mepliex dressing; CDI.  Full liquid diet; nectar thick d/t frequent cough after swallowing thin liquids. Plan: Hospice meeting at 0930. Discharge to [place] at 1430 via [facility].   "

## 2019-11-01 NOTE — PLAN OF CARE
"Patient is alert to self only. Patient continues to be on comfort care. VS and formal assessment deferred. Patient continually yelling out \"help me,\" PRN Haldol and PRN Seroquel given x1 - slightly effective. C/o generalized pain \"all over\", PRN SL Dilaudid given x1. R PICC and L pelvic drain removed this afternoon - gauze with Tegaderm dressing to each site CDI. Loose, incontinent BM x1 this shift. Incontinent of bladder, purewick in place, patient having dark ephraim urine output. Turn and repo q2h. Skin tears to R upper back scabbing over - SHANE,  and R buttock mepliex dressings - CDI.  Full liquid diet, patient having frequent cough when taking think liquids - liquids and meds thickened to nectar consistency. Patient moved to room 803-2 tonight. Plan for hospice meeting tomorrow at 0930. Patient to discharge to [place] [id number] via [facility]. Nursing to continue to monitor.   "

## 2019-11-01 NOTE — PROGRESS NOTES
Discharge Note:    D/I:  Received discharge orders for patient to discharge to [place] on [address] with [facility].  Patient will be transported via  stretcher at 14:30 today.  Patient's son is updated on discharge plan and in agreement with discharge plan.  Updated facility and faxed the orders.  Medications to be sent with patient.  PCS [place] completed, [place] to  and [place] [facility]    VELASQUEZ Willis, LGSW  [phone]  [facility]

## 2019-11-01 NOTE — PROGRESS NOTES
Attempted visit with family per Donna Laguna NP's request.  Pt sleeping, no family present.  Note plan is for discharge with hospice.  Palliative Care SW will be available as needed.      VELASQUEZ Amato, [facility]   Palliative Care    Pgr:[phone]  Ph: [phone]

## 2019-11-04 NOTE — PROGRESS NOTES
[facility]  PRIMARY CARE PROVIDER AND CLINIC:  [facility]), [address] / [facility] [address]  Chief Complaint   Patient presents with     Hospital F/U     [place] Medical Record Number:  [id number]  Place of Service where encounter took place:  [facility] ([facility]) [[id number]]    Sintia Connors  is a 84 year old  (1935), admitted to the above facility from  [facility]. Hospital stay 10/20/19 through 11/1/19..  Admitted to this facility for  medical management, nursing care and hospice.    HPI:    HPI information obtained from: facility chart records, facility staff, patient report and [facility] chart review.   Brief Summary of Hospital Course: recent hospitalization due to altered mental status and unwitnessed fall and increased agitation. She was sent via EMS to ED from [facility]. PMH includes paranoia, atrial fibrillation, dementia, seizure disorder, hemorrhagic CVA, amyloid angiopathy, depression. She presented to ED with -120, O2 sats 87-88%, CXR showed LLL pneumonia, BUN 43, creat 2.89, ca 10.2, WBC 31.6, and Lactic acid 2.2. She was admitted to hospital for severe sepsis and started on IV Zosyn then levaquin. She did go into AF with RVR and required IV diltiazem. Cardiology consulted and assisted with rate control.  Echo showed normal EF with decreased RV function. She then developed acute hypoxic respiratory failure thought due to IVF and found to have bilateral PULMONARY EMBOLISMs. US showed LEs with extensive bilateral DVT.  Neurology was consulted due to question of anticoagulation. Due to evidence of amyloid angiopathy rec IVC filter, which was placed on 10/25.   SPEECH THERAPY was consulted due to problems swallowing.   In addition, she was having abdominal distention. CT scan showed bowel obstruction and possible pelvic abscess. Surgery was consulted. Due to her h/o past abdominal  surgeries and co morbidities, surgical intervention is not an option. Antibiotics were changed to cover abdominal abscess. A drain and an NG tube were placed. She did not improve with above interventions.    Advanced directives were discussed and decision made to start comfort focused cares. She was signed onto hospice and transferred to TCU for nursing cares  Updates on Status Since Skilled nursing Admission: she cannot provide any meaningful information. Her lips are dry. She seems comfortable.     CODE STATUS/ADVANCE DIRECTIVES DISCUSSION:   DNR / DNI  Patient's living condition: lives in an assisted living facility  ALLERGIES: Lorazepam  PAST MEDICAL HISTORY:  has a past medical history of Atrial flutter (H) (4/12/15), Cerebral infarction (H), GERD (gastroesophageal reflux disease), PVC (premature ventricular contraction), Rheumatoid arthritis (H), S/P lumpectomy of breast, Seizures (H), and Senile osteoporosis.  PAST SURGICAL HISTORY:   has a past surgical history that includes hernia repair; hernia repair; Hysterectomy total abdominal, bilateral salpingo-oophorectomy, combined; appendectomy; Athroplasty right knee (4/2015); TOTAL KNEE ARTHROPLASTY (Left, 09/2015); and IR IVC Filter Placement (10/25/2019).  FAMILY HISTORY: family history is not on file.  SOCIAL HISTORY:   reports that she quit smoking about 36 years ago. She smoked 0.00 packs per day. She has never used smokeless tobacco. She reports that she does not drink alcohol or use drugs.    Post Discharge Medication Reconciliation Status: discharge medications reconciled, continue medications without change    Current Outpatient Medications   Medication Sig Dispense Refill     acetaminophen (TYLENOL) 325 MG tablet Take 2 tablets (650 mg) by mouth every 4 hours as needed for mild pain       atropine 1 % ophthalmic solution Take 2-4 drops by mouth, place under tongue or place inside cheek every 2 hours as needed for other (terminal respiratory secretions)  Not for ophthalmic use. 5 mL 1     bisacodyl (DULCOLAX) 10 MG suppository Unwrap and insert 1 suppository rectally twice daily as needed for constipation. 12 suppository 1     diclofenac (VOLTAREN) 1 % GEL APPLY 4 GRAMS TO KNEES OR 2 GRAMS TO HANDS FOUR TIMES DAILY USING ENCLOSED DOSING CARD. (Patient taking differently: Apply 4 g topically every 6 hours as needed APPLY 4 GRAMS TO KNEES OR 2 GRAMS TO HANDS FOUR TIMES DAILY USING ENCLOSED DOSING CARD.) 100 g 3     HYDROmorphone (DILAUDID) 2 MG tablet Take 0.5 tablets (1 mg) by mouth every 2 hours as needed for pain (Dyspnea) 30 tablet 0     levETIRAcetam (KEPPRA) 750 MG tablet Take 750 mg by mouth 2 times daily       MEDICATION INSTRUCTION If care facility cannot accept or use ranges, facility is instructed to use lower end of dosing range       MEDICATION INSTRUCTION If care facility cannot accept or use ranges, facility is instructed to use lower end of dosing range       omeprazole (PRILOSEC) 10 MG DR capsule TAKE ONE CAPSULE BY MOUTH DAILY 30 capsule 3     ondansetron (ZOFRAN-ODT) 4 MG ODT tab Take 1 tablet (4 mg) by mouth every 6 hours as needed for nausea or vomiting 30 tablet 0     ORDER FOR DME Equipment being ordered: Shower Chair 1 each 0     ORDER FOR DME Equipment being ordered: Bath transfer bench  Fax to:[phone] 1 Device 0     ORDER FOR DME Equipment being ordered: transfer bath bench 1 Device 0     QUEtiapine (SEROQUEL) 25 MG tablet Take 0.5 tablets (12.5 mg) by mouth 4 times daily as needed (anxiety, agitation) 30 tablet 0     QUEtiapine (SEROQUEL) 50 MG tablet Take 2 tablets (100 mg) by mouth 2 times daily 60 tablet 0     sennosides (SENOKOT) 8.6 MG tablet Take 1 tablet by mouth 2 times daily 100 tablet 1       ROS:  Unobtainable secondary to cognitive impairment.     Vitals:  BP (!) 168/147   Pulse 98   Temp 98  F (36.7  C)   Resp 18   SpO2 90%   Exam:  GENERAL APPEARANCE:  Alert, in no distress  ENT:  Mouth and posterior oropharynx normal  , dry mucous membranes, hearing acuity adequate   EYES:  EOM, conjunctivae, lids, pupils and irises normal  NECK:  No adenopathy,masses or thyromegaly  RESP:  respiratory effort and palpation of chest normal, no respiratory distress, RR 24 Lung sounds- poor effort, clear  CV:  Palpation and auscultation of heart done , rate and rhythm irreg, no murmur, no rub or gallop, Edema none  ABDOMEN:  No bowel sounds, soft, nontender, distended  M/S:   Gait and station generalized weakness, Digits and nails onychomycosis   SKIN:  Inspection/Palpation of skin and subcutaneous tissue scattered bruises  NEURO: 2-12 in normal limits and at patient's baseline  PSYCH:  insight and judgement, memory impaired , affect and mood flat    Lab/Diagnostic data:  [facility]. Please refer to them using [employer]Care Everywhere. and Recent labs in [facility] reviewed by me today.     ASSESSMENT/PLAN:  (J18.1) Pneumonia of left lower lobe due to infectious organism (H)  (primary encounter diagnosis)  Comment: recent hospitalization due to weakness and altered mental status. Found to have LLL pneumonia. Treated with levaquin. Hospital course complicated by acute hypoxic respiratory failure, atrial fibrillation with RVR, abd distention. She continues on supplemental oxygen but goals of care are now comfort focused.   Plan: monitor respiratory status  Dilaudid 1mg q 2 h prn for dyspnea  Atropine 1% drops q hour for increased respiratory secretions.     (N17.9) Acute kidney injury (H)  Comment: creat upon adm 2.89 with CK 3693. No known h/o CKD. She was treated with IV. Creat back to normal   Plan: comfort care  (I68.0) Cerebral amyloid angiopathy (CODE)  (I26.99) Pulmonary embolism, bilateral (H)  (I50.811) Acute right-sided heart failure (H)  Comment: during hospital stay she developed acute shortness of breath and hypoxia. Chest CT showed bilateral subsegmental PEs. Problem complicated by h/o amyloid angiopathy. Decision made  to place IVC filter. She was treated with IV lasix and supplemental oxygen. Respiratory rate today is 24-26. No cough. Again, focus of cares is comfort.   Plan: continue supplemental oxygen   Prn dilaudid.     (I82.403) Acute deep vein thrombosis (DVT) of both lower extremities, unspecified vein (H)  Comment: found to have bilateral DVTs. No anticoagulation due to Amyloid angiopathy. No c/o LE pain.   Plan: comfort care    (N73.9) Pelvic abscess in female  Comment: patient had ongoing abd distention not responsive to bowel meds. abd CT showed colonic distention and pelvic abscess. Patient considered not a candidate for surgery due to co morbidities. At this point she continues to have distended abdomen. She does not seem to have pain but she is at high risk for ileus. She is on full liquid diet. Nursing reports brown foam from mouth.   Plan: comfort cares.      (I48.0) Paroxysmal atrial fibrillation (H)  Comment: patient was tachycardic through out hospitalization. Cardiology was consulted and assisted with rate control. Due to goals of care all cardiac meds stopped  Plan: comfort care. We may need to add metop if tachycardia contributes to discomfort.     (R56.9) Seizures (H)  Comment: no change in PTA levetiracetam.   Plan: monitor for seizure activity.     (F22) Paranoia (H)  (R41.89,  R41.843) Cognitive deficit with impaired psychomotor function  Comment: patient was agitated during hospitalization. She did require 1:1 sitter in hospital. She was on zyprexa in hospital but discontinued upon transfer. Today she is incoherent but alert. She continues on serquel 100mg BID with prn available  Plan: prn seroquel in addition to scheduled seroquel.   Will adjust meds as need to promote comfort.       (Z51.5) Hospice care  Comment: patient signed onto  hospice upon discharge from hospital. She does not seem to be in pain. She is breathing a little fast but no respiratory distress.   Plan: prn dilaudid for pain and  dyspnea  Prn atropine for respiratory secretions.   Assist w cares  [facility] to follow here.   No need to check VS.   Oxygen for comfort only.     Total time spent with patient visit at the skilled nursing facility was 35 min including patient visit and review of past records. Greater than 50% of total time spent with counseling and coordinating care due to complex hospital course. Patient had multiple co morbidities. 30 minutes spent reviewing these records in order to address physical symptoms appropriately.     Electronically signed by:  RONY Juarez CNP

## 2019-11-04 NOTE — PROGRESS NOTES
[facility]  INITIAL VISIT NOTE  November 5, 2019    PRIMARY CARE PROVIDER AND CLINIC:  [facility]), [facility] [address] [facility] [address]    CHIEF COMPLAINT:  Hospital follow-up/Initial visit    HPI:    Sintia Connors is a 84 year old  (1935) female who was seen at [facility] on November 5, 2019 for an initial visit. Medical history is notable for hemorrhagic CVA, vascular dementia, obesity, atrial flutter/fibrillation, amyloid angiopathy, seizure disorder, hypertension, hypothyroidism, GERD, depression, and anxiety.  She was hospitalized at [facility] from October 20 through November 1, 2019 after she was found down.  Initial work-up in emergency department, revealed signs of severe sepsis, acute kidney injury, rhabdomyolysis, and possible LLL pneumonia.  On initial evaluation, patient was tachycardic and had elevated white count of 31.6 and lactic acidosis of 2.3.  Chest x-ray revealed left lower lobe infiltrate.  She was started on levofloxacin empirically.  Follow-up chest x-ray on October 22 showed no clear airspace disease.  CT chest obtained on October 24 due to ongoing tachycardia.  This revealed bilateral pulmonary emboli, predominantly in the subsegmental branches.  Neurology was consulted regarding initiation of anticoagulation in the setting of amyloid angiopathy.  Prior MRIs were reviewed and neurology did not recommend anticoagulation due to the extent of amyloid angiopathy and increased risk of intracranial bleed.  Venous Doppler study on October 24 showed DVT in bilateral CFV, femoral veins, popliteal, and bilateral posterior tibial veins.  An IVC filter was placed on October 25.  Echocardiogram on October 21, 2019 demonstrated normal LV systolic function with EF of 60-65%, mildly decreased RV systolic function, and moderate to severely dilated RV.  Inpatient, abdominal distention was noted.  Abdominal x-ray on October 22,  2019 showed normal gas pattern.  CT abdomen/pelvis on October 26, 2019 demonstrated dilated ascending and transverse colon without definite cause for obstruction.  There was fluid-filled, mottled, low-density collection with foci of air in inferior left pelvis, concerning for an abscess or possibly an infected hematoma.  She was started empirically on intravenous Zosyn and vancomycin on October 26, 2019.  Surgery was consulted.  Drain was placed by IR on October 27, 2019.  Culture of abscess fluid grew Streptococcus constellatus, Streptococcus intermedius, Bacteroides ovatus/xylanisolvens, and gram negative rods.  Repeat abdominal x-ray on October 20 showed market increase in colonic distention, differential included severe colonic ileus versus colonic obstruction.  An NG was placed on October 28.  GI was consulted.  Gastrografin enema on October 29 did not show any obstruction.  Antibiotics were stopped on October 30 for transition to comfort cares.  Hospital course was also complicated by atrial ablation with RVR, acute kidney injury, acute on chronic diastolic heart failure, hyponatremia, and rhabdomyolysis.  Prior to discharge, she was enrolled in [facility].    Patient is admitted to this facility for medical management, nursing care, and rehab.     Patient was seen in her room, while lying in bed.  She appears confused.  Her speech is at times unintelligible.  According to the staff, she has copious amount of sputum.  There is no report of chest pain, dyspnea, palpitation, fever, chills, nausea, vomiting, abdominal pain, or other complaints.    CODE STATUS:   DNR / DNI    PAST MEDICAL HISTORY:   Recent hospitalization for bilateral lower extremity DVT, pulmonary embolism, pelvic abscess, acute kidney injury, and ileus in October 2019, as outlined in HPI.  An IVC filter was placed on October 25, 2019.  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  Hemorrhagic CVA in 2017, in setting of  supratherapeutic INR  Amyloid angiopathy  Atrial fibrillation/flutter, not on anticoagulation due to history of hemorrhagic CVA  Hypertension  Chronic diastolic heart failure  Hypothyroidism  Seizure disorder  Rheumatoid arthritis  Depression  Anxiety  GERD  Osteopenia  Osteoarthritis  Vitamin D deficiency  Vitamin B12 deficiency  Insomnia  Obesity    PAST SURGICAL HISTORY:   Past Surgical History:   Procedure Laterality Date     APPENDECTOMY       Athroplasty right knee  2015     C TOTAL KNEE ARTHROPLASTY Left 2015     HERNIA REPAIR      hiatial     HERNIA REPAIR      inguial hernia repair     HYSTERECTOMY TOTAL ABDOMINAL, BILATERAL SALPINGO-OOPHORECTOMY, COMBINED       IR IVC FILTER PLACEMENT  10/25/2019       FAMILY HISTORY:   Family History   Problem Relation Age of Onset     Heart Disease No family hx of      Arrhythmia No family hx of        SOCIAL HISTORY:  Social History     Tobacco Use     Smoking status: Former Smoker     Packs/day: 0.00     Last attempt to quit: 1983     Years since quittin.3     Smokeless tobacco: Never Used   Substance Use Topics     Alcohol use: No     Alcohol/week: 0.0 standard drinks       MEDICATIONS:  Current Outpatient Medications   Medication Sig Dispense Refill     acetaminophen (TYLENOL) 325 MG tablet Take 2 tablets (650 mg) by mouth every 4 hours as needed for mild pain       atropine 1 % ophthalmic solution Take 2-4 drops by mouth, place under tongue or place inside cheek every 2 hours as needed for other (terminal respiratory secretions) Not for ophthalmic use. 5 mL 1     bisacodyl (DULCOLAX) 10 MG suppository Unwrap and insert 1 suppository rectally twice daily as needed for constipation. 12 suppository 1     diclofenac (VOLTAREN) 1 % GEL APPLY 4 GRAMS TO KNEES OR 2 GRAMS TO HANDS FOUR TIMES DAILY USING ENCLOSED DOSING CARD. (Patient taking differently: Apply 4 g topically every 6 hours as needed APPLY 4 GRAMS TO KNEES OR 2 GRAMS TO HANDS FOUR TIMES DAILY  USING ENCLOSED DOSING CARD.) 100 g 3     HYDROmorphone (DILAUDID) 2 MG tablet Take 0.5 tablets (1 mg) by mouth every 2 hours as needed for pain (Dyspnea) 30 tablet 0     levETIRAcetam (KEPPRA) 750 MG tablet Take 750 mg by mouth 2 times daily       MEDICATION INSTRUCTION If care facility cannot accept or use ranges, facility is instructed to use lower end of dosing range       MEDICATION INSTRUCTION If care facility cannot accept or use ranges, facility is instructed to use lower end of dosing range       omeprazole (PRILOSEC) 10 MG DR capsule TAKE ONE CAPSULE BY MOUTH DAILY 30 capsule 3     ondansetron (ZOFRAN-ODT) 4 MG ODT tab Take 1 tablet (4 mg) by mouth every 6 hours as needed for nausea or vomiting 30 tablet 0     ORDER FOR DME Equipment being ordered: Shower Chair 1 each 0     ORDER FOR DME Equipment being ordered: Bath transfer bench  Fax to:[phone] 1 Device 0     ORDER FOR DME Equipment being ordered: transfer bath bench 1 Device 0     QUEtiapine (SEROQUEL) 25 MG tablet Take 0.5 tablets (12.5 mg) by mouth 4 times daily as needed (anxiety, agitation) 30 tablet 0     QUEtiapine (SEROQUEL) 50 MG tablet Take 2 tablets (100 mg) by mouth 2 times daily 60 tablet 0     sennosides (SENOKOT) 8.6 MG tablet Take 1 tablet by mouth 2 times daily 100 tablet 1       ALLERGIES:  Allergies   Allergen Reactions     Lorazepam Visual Disturbance     Severe hallucinations;       ROS:  10 point ROS was limited due to patient's confusion. It was reviewed as much as possible as outlined in HPI.    PHYSICAL EXAM:  Vitals: 138/79, heart rate 91, respiratory rate 18, temperature 96.3  F, oxygen saturation 88%  Gen: Weak and frail appearing  HEENT: Normocephalic; oropharynx clear  Card: Normal S1, S2, irregularly irregular rhythm  Resp: Lungs clear to auscultation bilaterally on ant chest exam  GI: Abdomen soft, not-tender, non-distended, +BS  Ext: 1-2+ B/L LE edema  Neuro: CX II-XII grossly intact; ROM in all four extremities  grossly in tact  Psych: Alert and oriented x1  Skin: No acute rash    LABORATORY/IMAGING DATA:  Lab Results   Component Value Date    WBC 15.4 10/30/2019     Lab Results   Component Value Date    RBC 4.46 10/30/2019     Lab Results   Component Value Date    HGB 12.4 10/30/2019     Lab Results   Component Value Date    HCT 41.3 10/30/2019     Lab Results   Component Value Date    MCV 93 10/30/2019     Lab Results   Component Value Date    MCH 27.8 10/30/2019     Lab Results   Component Value Date    MCHC 30.0 10/30/2019     Lab Results   Component Value Date    RDW 15.5 10/30/2019     Lab Results   Component Value Date     10/30/2019     Last Comprehensive Metabolic Panel:  Sodium   Date Value Ref Range Status   10/30/2019 152 (H) 133 - 144 mmol/L Final     Potassium   Date Value Ref Range Status   10/30/2019 3.1 (L) 3.4 - 5.3 mmol/L Final     Chloride   Date Value Ref Range Status   10/30/2019 125 (H) 94 - 109 mmol/L Final     Carbon Dioxide   Date Value Ref Range Status   10/30/2019 21 20 - 32 mmol/L Final     Anion Gap   Date Value Ref Range Status   10/30/2019 6 3 - 14 mmol/L Final     Glucose   Date Value Ref Range Status   10/30/2019 120 (H) 70 - 99 mg/dL Final     Urea Nitrogen   Date Value Ref Range Status   10/30/2019 15 7 - 30 mg/dL Final     Creatinine   Date Value Ref Range Status   10/30/2019 0.88 0.52 - 1.04 mg/dL Final     GFR Estimate   Date Value Ref Range Status   10/30/2019 61 >60 mL/min/[1.73_m2] Final     Comment:     Non  GFR Calc  Starting 12/18/2018, serum creatinine based estimated GFR (eGFR) will be   calculated using the Chronic Kidney Disease Epidemiology Collaboration   (CKD-EPI) equation.       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  Hemorrhagic CVA  Ambulate angiopathy  Seizure disorder  Hypertension  Hypothyroidism  GERD  Obesity  Depression with psychotic features    PLAN:  Continue palliative/comfort care  Continue supplement oxygen for comfort  PRN comfort medications including acetaminophen, atropine, Dilaudid, Zofran, and bowel regimen bowel regimen  Continue prior to admission Keppra and Seroquel   Adjust medications as indicated to promote comfort  [facility] is following  CXR findings are non-specific and patient clinically patient does not appear to have TB, therefore is no indication for isolation          Electronically signed by:  Gladys Yoo MD

## 2019-11-04 NOTE — LETTER
11/4/2019        RE: Sintia Connors  [facility]  [address] [facility] [address]        [facility]  PRIMARY CARE PROVIDER AND CLINIC:  [facility]), [address] [facility] [address]  Chief Complaint   Patient presents with     Hospital F/U     [facility] [facility] Record Number:  [id number]  Place of Service where encounter took place:  [facility] ([facility] [[id number]]    Sintia Connors  is a 84 year old  (1935), admitted to the above facility from  [facility]. Hospital stay 10/20/19 through 11/1/19..  Admitted to this facility for  medical management, nursing care and hospice.    HPI:    HPI information obtained from: facility chart records, facility staff, patient report and [facility] chart review.   Brief Summary of Hospital Course: recent hospitalization due to altered mental status and unwitnessed fall and increased agitation. She was sent via EMS to ED from [facility]. PMH includes paranoia, atrial fibrillation, dementia, seizure disorder, hemorrhagic CVA, amyloid angiopathy, depression. She presented to ED with -120, O2 sats 87-88%, CXR showed LLL pneumonia, BUN 43, creat 2.89, ca 10.2, WBC 31.6, and Lactic acid 2.2. She was admitted to hospital for severe sepsis and started on IV Zosyn then levaquin. She did go into AF with RVR and required IV diltiazem. Cardiology consulted and assisted with rate control.  Echo showed normal EF with decreased RV function. She then developed acute hypoxic respiratory failure thought due to IVF and found to have bilateral PULMONARY EMBOLISMs. US showed LEs with extensive bilateral DVT.  Neurology was consulted due to question of anticoagulation. Due to evidence of amyloid angiopathy rec IVC filter, which was placed on 10/25.   SPEECH THERAPY was consulted due to problems swallowing.   In addition, she was having abdominal distention. CT scan  showed bowel obstruction and possible pelvic abscess. Surgery was consulted. Due to her h/o past abdominal surgeries and co morbidities, surgical intervention is not an option. Antibiotics were changed to cover abdominal abscess. A drain and an NG tube were placed. She did not improve with above interventions.    Advanced directives were discussed and decision made to start comfort focused cares. She was signed onto hospice and transferred to TCU for nursing cares  Updates on Status Since Skilled nursing Admission: she cannot provide any meaningful information. Her lips are dry. She seems comfortable.     CODE STATUS/ADVANCE DIRECTIVES DISCUSSION:   DNR / DNI  Patient's living condition: lives in an assisted living facility  ALLERGIES: Lorazepam  PAST MEDICAL HISTORY:  has a past medical history of Atrial flutter (H) (4/12/15), Cerebral infarction (H), GERD (gastroesophageal reflux disease), PVC (premature ventricular contraction), Rheumatoid arthritis (H), S/P lumpectomy of breast, Seizures (H), and Senile osteoporosis.  PAST SURGICAL HISTORY:   has a past surgical history that includes hernia repair; hernia repair; Hysterectomy total abdominal, bilateral salpingo-oophorectomy, combined; appendectomy; Athroplasty right knee (4/2015); TOTAL KNEE ARTHROPLASTY (Left, 09/2015); and IR IVC Filter Placement (10/25/2019).  FAMILY HISTORY: family history is not on file.  SOCIAL HISTORY:   reports that she quit smoking about 36 years ago. She smoked 0.00 packs per day. She has never used smokeless tobacco. She reports that she does not drink alcohol or use drugs.    Post Discharge Medication Reconciliation Status: discharge medications reconciled, continue medications without change    Current Outpatient Medications   Medication Sig Dispense Refill     acetaminophen (TYLENOL) 325 MG tablet Take 2 tablets (650 mg) by mouth every 4 hours as needed for mild pain       atropine 1 % ophthalmic solution Take 2-4 drops by mouth,  place under tongue or place inside cheek every 2 hours as needed for other (terminal respiratory secretions) Not for ophthalmic use. 5 mL 1     bisacodyl (DULCOLAX) 10 MG suppository Unwrap and insert 1 suppository rectally twice daily as needed for constipation. 12 suppository 1     diclofenac (VOLTAREN) 1 % GEL APPLY 4 GRAMS TO KNEES OR 2 GRAMS TO HANDS FOUR TIMES DAILY USING ENCLOSED DOSING CARD. (Patient taking differently: Apply 4 g topically every 6 hours as needed APPLY 4 GRAMS TO KNEES OR 2 GRAMS TO HANDS FOUR TIMES DAILY USING ENCLOSED DOSING CARD.) 100 g 3     HYDROmorphone (DILAUDID) 2 MG tablet Take 0.5 tablets (1 mg) by mouth every 2 hours as needed for pain (Dyspnea) 30 tablet 0     levETIRAcetam (KEPPRA) 750 MG tablet Take 750 mg by mouth 2 times daily       MEDICATION INSTRUCTION If care facility cannot accept or use ranges, facility is instructed to use lower end of dosing range       MEDICATION INSTRUCTION If care facility cannot accept or use ranges, facility is instructed to use lower end of dosing range       omeprazole (PRILOSEC) 10 MG DR capsule TAKE ONE CAPSULE BY MOUTH DAILY 30 capsule 3     ondansetron (ZOFRAN-ODT) 4 MG ODT tab Take 1 tablet (4 mg) by mouth every 6 hours as needed for nausea or vomiting 30 tablet 0     ORDER FOR DME Equipment being ordered: Shower Chair 1 each 0     ORDER FOR DME Equipment being ordered: Bath transfer bench  Fax to:[phone] 1 Device 0     ORDER FOR DME Equipment being ordered: transfer bath bench 1 Device 0     QUEtiapine (SEROQUEL) 25 MG tablet Take 0.5 tablets (12.5 mg) by mouth 4 times daily as needed (anxiety, agitation) 30 tablet 0     QUEtiapine (SEROQUEL) 50 MG tablet Take 2 tablets (100 mg) by mouth 2 times daily 60 tablet 0     sennosides (SENOKOT) 8.6 MG tablet Take 1 tablet by mouth 2 times daily 100 tablet 1       ROS:  Unobtainable secondary to cognitive impairment.     Vitals:  BP (!) 168/147   Pulse 98   Temp 98  F (36.7  C)   Resp 18    SpO2 90%   Exam:  GENERAL APPEARANCE:  Alert, in no distress  ENT:  Mouth and posterior oropharynx normal , dry mucous membranes, hearing acuity adequate   EYES:  EOM, conjunctivae, lids, pupils and irises normal  NECK:  No adenopathy,masses or thyromegaly  RESP:  respiratory effort and palpation of chest normal, no respiratory distress, RR 24 Lung sounds- poor effort, clear  CV:  Palpation and auscultation of heart done , rate and rhythm irreg, no murmur, no rub or gallop, Edema none  ABDOMEN:  No bowel sounds, soft, nontender, distended  M/S:   Gait and station generalized weakness, Digits and nails onychomycosis   SKIN:  Inspection/Palpation of skin and subcutaneous tissue scattered bruises  NEURO: 2-12 in normal limits and at patient's baseline  PSYCH:  insight and judgement, memory impaired , affect and mood flat    Lab/Diagnostic data:  Labs done in SNF are in [facility]. Please refer to them using [facility]Care Everywhere. and Recent labs in EPIC reviewed by me today.     ASSESSMENT/PLAN:  (J18.1) Pneumonia of left lower lobe due to infectious organism (H)  (primary encounter diagnosis)  Comment: recent hospitalization due to weakness and altered mental status. Found to have LLL pneumonia. Treated with levaquin. Hospital course complicated by acute hypoxic respiratory failure, atrial fibrillation with RVR, abd distention. She continues on supplemental oxygen but goals of care are now comfort focused.   Plan: monitor respiratory status  Dilaudid 1mg q 2 h prn for dyspnea  Atropine 1% drops q hour for increased respiratory secretions.     (N17.9) Acute kidney injury (H)  Comment: creat upon adm 2.89 with CK 3693. No known h/o CKD. She was treated with IV. Creat back to normal   Plan: comfort care  (I68.0) Cerebral amyloid angiopathy (CODE)  (I26.99) Pulmonary embolism, bilateral (H)  (I50.811) Acute right-sided heart failure (H)  Comment: during hospital stay she developed acute shortness of breath and  hypoxia. Chest CT showed bilateral subsegmental PEs. Problem complicated by h/o amyloid angiopathy. Decision made to place IVC filter. She was treated with IV lasix and supplemental oxygen. Respiratory rate today is 24-26. No cough. Again, focus of cares is comfort.   Plan: continue supplemental oxygen   Prn dilaudid.     (I82.403) Acute deep vein thrombosis (DVT) of both lower extremities, unspecified vein (H)  Comment: found to have bilateral DVTs. No anticoagulation due to Amyloid angiopathy. No c/o LE pain.   Plan: comfort care    (N73.9) Pelvic abscess in female  Comment: patient had ongoing abd distention not responsive to bowel meds. abd CT showed colonic distention and pelvic abscess. Patient considered not a candidate for surgery due to co morbidities. At this point she continues to have distended abdomen. She does not seem to have pain but she is at high risk for ileus. She is on full liquid diet. Nursing reports brown foam from mouth.   Plan: comfort cares.      (I48.0) Paroxysmal atrial fibrillation (H)  Comment: patient was tachycardic through out hospitalization. Cardiology was consulted and assisted with rate control. Due to goals of care all cardiac meds stopped  Plan: comfort care. We may need to add metop if tachycardia contributes to discomfort.     (R56.9) Seizures (H)  Comment: no change in PTA levetiracetam.   Plan: monitor for seizure activity.     (F22) Paranoia (H)  (R41.89,  R41.843) Cognitive deficit with impaired psychomotor function  Comment: patient was agitated during hospitalization. She did require 1:1 sitter in hospital. She was on zyprexa in hospital but discontinued upon transfer. Today she is incoherent but alert. She continues on serquel 100mg BID with prn available  Plan: prn seroquel in addition to scheduled seroquel.   Will adjust meds as need to promote comfort.       (Z51.5) Hospice care  Comment: patient signed onto  hospice upon discharge from hospital. She does not  seem to be in pain. She is breathing a little fast but no respiratory distress.   Plan: prn dilaudid for pain and dyspnea  Prn atropine for respiratory secretions.   Assist w cares  [facility] to follow here.   No need to check VS.   Oxygen for comfort only.     Total time spent with patient visit at the skilled nursing facility was 35 min including patient visit and review of past records. Greater than 50% of total time spent with counseling and coordinating care due to complex hospital course. Patient had multiple co morbidities. 30 minutes spent reviewing these records in order to address physical symptoms appropriately.     Electronically signed by:  RONY Juarez CNP                         Sincerely,        RONY Juarez CNP

## 2019-11-05 NOTE — LETTER
11/5/2019        RE: Sintia Connors  [facility]  [address] [facility] [address]        [facility]  INITIAL VISIT NOTE  November 5, 2019    PRIMARY CARE PROVIDER AND CLINIC:  [facility]), [facility] [address] [facility] [address]    CHIEF COMPLAINT:  Hospital follow-up/Initial visit    HPI:    Sintia Connors is a 84 year old  (1935) female who was seen at [facility] on November 5, 2019 for an initial visit. Medical history is notable for hemorrhagic CVA, vascular dementia, obesity, atrial flutter/fibrillation, amyloid angiopathy, seizure disorder, hypertension, hypothyroidism, GERD, depression, and anxiety.  She was hospitalized at [facility] from October 20 through November 1, 2019 after she was found down.  Initial work-up in emergency department, revealed signs of severe sepsis, acute kidney injury, rhabdomyolysis, and possible LLL pneumonia.  On initial evaluation, patient was tachycardic and had elevated white count of 31.6 and lactic acidosis of 2.3.  Chest x-ray revealed left lower lobe infiltrate.  She was started on levofloxacin empirically.  Follow-up chest x-ray on October 22 showed no clear airspace disease.  CT chest obtained on October 24 due to ongoing tachycardia.  This revealed bilateral pulmonary emboli, predominantly in the subsegmental branches.  Neurology was consulted regarding initiation of anticoagulation in the setting of amyloid angiopathy.  Prior MRIs were reviewed and neurology did not recommend anticoagulation due to the extent of amyloid angiopathy and increased risk of intracranial bleed.  Venous Doppler study on October 24 showed DVT in bilateral CFV, femoral veins, popliteal, and bilateral posterior tibial veins.  An IVC filter was placed on October 25.  Echocardiogram on October 21, 2019 demonstrated normal LV systolic function with EF of 60-65%, mildly decreased RV systolic function,  and moderate to severely dilated RV.  Inpatient, abdominal distention was noted.  Abdominal x-ray on October 22, 2019 showed normal gas pattern.  CT abdomen/pelvis on October 26, 2019 demonstrated dilated ascending and transverse colon without definite cause for obstruction.  There was fluid-filled, mottled, low-density collection with foci of air in inferior left pelvis, concerning for an abscess or possibly an infected hematoma.  She was started empirically on intravenous Zosyn and vancomycin on October 26, 2019.  Surgery was consulted.  Drain was placed by IR on October 27, 2019.  Culture of abscess fluid grew Streptococcus constellatus, Streptococcus intermedius, Bacteroides ovatus/xylanisolvens, and gram negative rods.  Repeat abdominal x-ray on October 20 showed market increase in colonic distention, differential included severe colonic ileus versus colonic obstruction.  An NG was placed on October 28.  GI was consulted.  Gastrografin enema on October 29 did not show any obstruction.  Antibiotics were stopped on October 30 for transition to comfort cares.  Hospital course was also complicated by atrial ablation with RVR, acute kidney injury, acute on chronic diastolic heart failure, hyponatremia, and rhabdomyolysis.  Prior to discharge, she was enrolled in [facility].    Patient is admitted to this facility for medical management, nursing care, and rehab.     Patient was seen in her room, while lying in bed.  She appears confused.  Her speech is at times unintelligible.  According to the staff, she has copious amount of sputum.  There is no report of chest pain, dyspnea, palpitation, fever, chills, nausea, vomiting, abdominal pain, or other complaints.    CODE STATUS:   DNR / DNI    PAST MEDICAL HISTORY:   Recent hospitalization for bilateral lower extremity DVT, pulmonary embolism, pelvic abscess, acute kidney injury, and ileus in October 2019, as outlined in HPI.  An IVC filter was placed on October  2019.  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  Hemorrhagic CVA in 2017, in setting of supratherapeutic INR  Amyloid angiopathy  Atrial fibrillation/flutter, not on anticoagulation due to history of hemorrhagic CVA  Hypertension  Chronic diastolic heart failure  Hypothyroidism  Seizure disorder  Rheumatoid arthritis  Depression  Anxiety  GERD  Osteopenia  Osteoarthritis  Vitamin D deficiency  Vitamin B12 deficiency  Insomnia  Obesity    PAST SURGICAL HISTORY:   Past Surgical History:   Procedure Laterality Date     APPENDECTOMY       Athroplasty right knee  2015     C TOTAL KNEE ARTHROPLASTY Left 2015     HERNIA REPAIR      hiatial     HERNIA REPAIR      inguial hernia repair     HYSTERECTOMY TOTAL ABDOMINAL, BILATERAL SALPINGO-OOPHORECTOMY, COMBINED       IR IVC FILTER PLACEMENT  10/25/2019       FAMILY HISTORY:   Family History   Problem Relation Age of Onset     Heart Disease No family hx of      Arrhythmia No family hx of        SOCIAL HISTORY:  Social History     Tobacco Use     Smoking status: Former Smoker     Packs/day: 0.00     Last attempt to quit: 1983     Years since quittin.3     Smokeless tobacco: Never Used   Substance Use Topics     Alcohol use: No     Alcohol/week: 0.0 standard drinks       MEDICATIONS:  Current Outpatient Medications   Medication Sig Dispense Refill     acetaminophen (TYLENOL) 325 MG tablet Take 2 tablets (650 mg) by mouth every 4 hours as needed for mild pain       atropine 1 % ophthalmic solution Take 2-4 drops by mouth, place under tongue or place inside cheek every 2 hours as needed for other (terminal respiratory secretions) Not for ophthalmic use. 5 mL 1     bisacodyl (DULCOLAX) 10 MG suppository Unwrap and insert 1 suppository rectally twice daily as needed for constipation. 12 suppository 1     diclofenac (VOLTAREN) 1 % GEL APPLY 4 GRAMS TO KNEES OR 2 GRAMS TO HANDS FOUR TIMES DAILY USING ENCLOSED DOSING CARD. (Patient taking  differently: Apply 4 g topically every 6 hours as needed APPLY 4 GRAMS TO KNEES OR 2 GRAMS TO HANDS FOUR TIMES DAILY USING ENCLOSED DOSING CARD.) 100 g 3     HYDROmorphone (DILAUDID) 2 MG tablet Take 0.5 tablets (1 mg) by mouth every 2 hours as needed for pain (Dyspnea) 30 tablet 0     levETIRAcetam (KEPPRA) 750 MG tablet Take 750 mg by mouth 2 times daily       MEDICATION INSTRUCTION If care facility cannot accept or use ranges, facility is instructed to use lower end of dosing range       MEDICATION INSTRUCTION If care facility cannot accept or use ranges, facility is instructed to use lower end of dosing range       omeprazole (PRILOSEC) 10 MG DR capsule TAKE ONE CAPSULE BY MOUTH DAILY 30 capsule 3     ondansetron (ZOFRAN-ODT) 4 MG ODT tab Take 1 tablet (4 mg) by mouth every 6 hours as needed for nausea or vomiting 30 tablet 0     ORDER FOR DME Equipment being ordered: Shower Chair 1 each 0     ORDER FOR DME Equipment being ordered: Bath transfer bench  Fax to:[phone] 1 Device 0     ORDER FOR DME Equipment being ordered: transfer bath bench 1 Device 0     QUEtiapine (SEROQUEL) 25 MG tablet Take 0.5 tablets (12.5 mg) by mouth 4 times daily as needed (anxiety, agitation) 30 tablet 0     QUEtiapine (SEROQUEL) 50 MG tablet Take 2 tablets (100 mg) by mouth 2 times daily 60 tablet 0     sennosides (SENOKOT) 8.6 MG tablet Take 1 tablet by mouth 2 times daily 100 tablet 1       ALLERGIES:  Allergies   Allergen Reactions     Lorazepam Visual Disturbance     Severe hallucinations;       ROS:  10 point ROS neg other than the symptoms noted above in the HPI.    PHYSICAL EXAM:  Vitals: 138/79, heart rate 91, respiratory rate 18, temperature 96.3  F, oxygen saturation 88%  Gen: Weak and frail appearing  HEENT: Normocephalic; oropharynx clear  Card: Normal S1, S2, irregularly irregular rhythm  Resp: Lungs clear to auscultation bilaterally on ant chest exam  GI: Abdomen soft, not-tender, non-distended, +BS  Ext: 1-2+ B/L LE  edema  Neuro: CX II-XII grossly intact; ROM in all four extremities grossly in tact  Psych: Alert and oriented x1  Skin: No acute rash    LABORATORY/IMAGING DATA:  Lab Results   Component Value Date    WBC 15.4 10/30/2019     Lab Results   Component Value Date    RBC 4.46 10/30/2019     Lab Results   Component Value Date    HGB 12.4 10/30/2019     Lab Results   Component Value Date    HCT 41.3 10/30/2019     Lab Results   Component Value Date    MCV 93 10/30/2019     Lab Results   Component Value Date    MCH 27.8 10/30/2019     Lab Results   Component Value Date    MCHC 30.0 10/30/2019     Lab Results   Component Value Date    RDW 15.5 10/30/2019     Lab Results   Component Value Date     10/30/2019     Last Comprehensive Metabolic Panel:  Sodium   Date Value Ref Range Status   10/30/2019 152 (H) 133 - 144 mmol/L Final     Potassium   Date Value Ref Range Status   10/30/2019 3.1 (L) 3.4 - 5.3 mmol/L Final     Chloride   Date Value Ref Range Status   10/30/2019 125 (H) 94 - 109 mmol/L Final     Carbon Dioxide   Date Value Ref Range Status   10/30/2019 21 20 - 32 mmol/L Final     Anion Gap   Date Value Ref Range Status   10/30/2019 6 3 - 14 mmol/L Final     Glucose   Date Value Ref Range Status   10/30/2019 120 (H) 70 - 99 mg/dL Final     Urea Nitrogen   Date Value Ref Range Status   10/30/2019 15 7 - 30 mg/dL Final     Creatinine   Date Value Ref Range Status   10/30/2019 0.88 0.52 - 1.04 mg/dL Final     GFR Estimate   Date Value Ref Range Status   10/30/2019 61 >60 mL/min/[1.73_m2] Final     Comment:     Non  GFR Calc  Starting 12/18/2018, serum creatinine based estimated GFR (eGFR) will be   calculated using the Chronic Kidney Disease Epidemiology Collaboration   (CKD-EPI) equation.       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  Hemorrhagic CVA  Ambulate angiopathy  Seizure disorder  Hypertension  Hypothyroidism  GERD  Obesity  Depression with psychotic features    PLAN:  Continue palliative/comfort care  Continue supplement oxygen for comfort  PRN comfort medications including acetaminophen, atropine, Dilaudid, Zofran, and bowel regimen bowel regimen  Continue prior to admission Keppra and Seroquel   Adjust medications as indicated to promote comfort  [facility] is following  CXR findings are non-specific and patient clinically patient does not appear to have TB, therefore is no indication for isolation          Electronically signed by:  Gladys Yoo MD                      Sincerely,        Gladys Yoo MD
